# Patient Record
Sex: MALE | Race: WHITE | NOT HISPANIC OR LATINO | Employment: FULL TIME | URBAN - METROPOLITAN AREA
[De-identification: names, ages, dates, MRNs, and addresses within clinical notes are randomized per-mention and may not be internally consistent; named-entity substitution may affect disease eponyms.]

---

## 2017-01-09 ENCOUNTER — GENERIC CONVERSION - ENCOUNTER (OUTPATIENT)
Dept: OTHER | Facility: OTHER | Age: 52
End: 2017-01-09

## 2017-01-20 ENCOUNTER — ALLSCRIPTS OFFICE VISIT (OUTPATIENT)
Dept: OTHER | Facility: OTHER | Age: 52
End: 2017-01-20

## 2017-02-17 LAB
A/G RATIO (HISTORICAL): 1.4 (ref 1.1–2.5)
ALBUMIN SERPL BCP-MCNC: 4.3 G/DL (ref 3.5–5.5)
ALP SERPL-CCNC: 108 IU/L (ref 39–117)
ALT SERPL W P-5'-P-CCNC: 41 IU/L (ref 0–44)
AST SERPL W P-5'-P-CCNC: 23 IU/L (ref 0–40)
BILIRUB SERPL-MCNC: 0.4 MG/DL (ref 0–1.2)
BUN SERPL-MCNC: 16 MG/DL (ref 6–24)
BUN/CREA RATIO (HISTORICAL): 20 (ref 9–20)
CALCIUM SERPL-MCNC: 9.5 MG/DL (ref 8.7–10.2)
CHLORIDE SERPL-SCNC: 98 MMOL/L (ref 96–106)
CHOLEST SERPL-MCNC: 190 MG/DL (ref 100–199)
CO2 SERPL-SCNC: 28 MMOL/L (ref 18–29)
CREAT SERPL-MCNC: 0.81 MG/DL (ref 0.76–1.27)
EGFR AFRICAN AMERICAN (HISTORICAL): 117 ML/MIN/1.73
EGFR-AMERICAN CALC (HISTORICAL): 101 ML/MIN/1.73
GLUCOSE SERPL-MCNC: 165 MG/DL (ref 65–99)
HBA1C MFR BLD HPLC: 7.6 % (ref 4.8–5.6)
HDLC SERPL-MCNC: 47 MG/DL
LDL/HDL RATIO (HISTORICAL): 2.3 RATIO UNITS (ref 0–3.6)
LDLC SERPL CALC-MCNC: 108 MG/DL (ref 0–99)
MICROALBUM.,U,RANDOM (HISTORICAL): 34.9 UG/ML
POTASSIUM SERPL-SCNC: 4.3 MMOL/L (ref 3.5–5.2)
SODIUM SERPL-SCNC: 142 MMOL/L (ref 134–144)
TOT. GLOBULIN, SERUM (HISTORICAL): 3.1 G/DL (ref 1.5–4.5)
TOTAL PROTEIN (HISTORICAL): 7.4 G/DL (ref 6–8.5)
TRIGL SERPL-MCNC: 175 MG/DL (ref 0–149)
VLDLC SERPL CALC-MCNC: 35 MG/DL (ref 5–40)

## 2017-02-18 LAB — PROSTATE SPECIFIC ANTIGEN (HISTORICAL): 0.9 NG/ML (ref 0–4)

## 2017-02-19 ENCOUNTER — GENERIC CONVERSION - ENCOUNTER (OUTPATIENT)
Dept: OTHER | Facility: OTHER | Age: 52
End: 2017-02-19

## 2017-02-21 ENCOUNTER — GENERIC CONVERSION - ENCOUNTER (OUTPATIENT)
Dept: OTHER | Facility: OTHER | Age: 52
End: 2017-02-21

## 2017-03-03 ENCOUNTER — ALLSCRIPTS OFFICE VISIT (OUTPATIENT)
Dept: OTHER | Facility: OTHER | Age: 52
End: 2017-03-03

## 2017-03-17 ENCOUNTER — ALLSCRIPTS OFFICE VISIT (OUTPATIENT)
Dept: OTHER | Facility: OTHER | Age: 52
End: 2017-03-17

## 2017-03-24 ENCOUNTER — ALLSCRIPTS OFFICE VISIT (OUTPATIENT)
Dept: OTHER | Facility: OTHER | Age: 52
End: 2017-03-24

## 2017-03-31 ENCOUNTER — ALLSCRIPTS OFFICE VISIT (OUTPATIENT)
Dept: OTHER | Facility: OTHER | Age: 52
End: 2017-03-31

## 2017-04-14 ENCOUNTER — ALLSCRIPTS OFFICE VISIT (OUTPATIENT)
Dept: OTHER | Facility: OTHER | Age: 52
End: 2017-04-14

## 2017-04-14 ENCOUNTER — HOSPITAL ENCOUNTER (OUTPATIENT)
Dept: RADIOLOGY | Facility: CLINIC | Age: 52
Discharge: HOME | End: 2017-04-14
Payer: COMMERCIAL

## 2017-04-14 DIAGNOSIS — M25.551 PAIN IN RIGHT HIP: ICD-10-CM

## 2017-04-14 PROCEDURE — 73502 X-RAY EXAM HIP UNI 2-3 VIEWS: CPT

## 2017-06-16 ENCOUNTER — ALLSCRIPTS OFFICE VISIT (OUTPATIENT)
Dept: OTHER | Facility: OTHER | Age: 52
End: 2017-06-16

## 2017-07-31 ENCOUNTER — GENERIC CONVERSION - ENCOUNTER (OUTPATIENT)
Dept: OTHER | Facility: OTHER | Age: 52
End: 2017-07-31

## 2017-08-02 ENCOUNTER — ALLSCRIPTS OFFICE VISIT (OUTPATIENT)
Dept: OTHER | Facility: OTHER | Age: 52
End: 2017-08-02

## 2017-08-14 ENCOUNTER — APPOINTMENT (INPATIENT)
Dept: RADIOLOGY | Facility: HOSPITAL | Age: 52
DRG: 420 | End: 2017-08-14
Payer: COMMERCIAL

## 2017-08-14 ENCOUNTER — ALLSCRIPTS OFFICE VISIT (OUTPATIENT)
Dept: OTHER | Facility: OTHER | Age: 52
End: 2017-08-14

## 2017-08-14 ENCOUNTER — HOSPITAL ENCOUNTER (INPATIENT)
Facility: HOSPITAL | Age: 52
LOS: 2 days | Discharge: HOME | DRG: 420 | End: 2017-08-16
Attending: EMERGENCY MEDICINE | Admitting: INTERNAL MEDICINE
Payer: COMMERCIAL

## 2017-08-14 ENCOUNTER — GENERIC CONVERSION - ENCOUNTER (OUTPATIENT)
Dept: OTHER | Facility: OTHER | Age: 52
End: 2017-08-14

## 2017-08-14 DIAGNOSIS — E11.9 DIABETES MELLITUS, TYPE 2 (HCC): Chronic | ICD-10-CM

## 2017-08-14 DIAGNOSIS — E87.1 HYPONATREMIA: ICD-10-CM

## 2017-08-14 DIAGNOSIS — R73.9 HYPERGLYCEMIA: Primary | ICD-10-CM

## 2017-08-14 PROBLEM — E78.5 HYPERLIPIDEMIA: Chronic | Status: ACTIVE | Noted: 2017-08-14

## 2017-08-14 PROBLEM — E87.6 HYPOKALEMIA: Status: ACTIVE | Noted: 2017-08-14

## 2017-08-14 PROBLEM — N18.1 DM TYPE 2 CAUSING CKD STAGE 1 (HCC): Chronic | Status: ACTIVE | Noted: 2017-08-14

## 2017-08-14 PROBLEM — E11.22 DM TYPE 2 CAUSING CKD STAGE 1 (HCC): Chronic | Status: ACTIVE | Noted: 2017-08-14

## 2017-08-14 PROBLEM — R53.1 WEAKNESS GENERALIZED: Status: ACTIVE | Noted: 2017-08-14

## 2017-08-14 PROBLEM — E11.65 HYPERGLYCEMIA DUE TO TYPE 2 DIABETES MELLITUS (HCC): Status: ACTIVE | Noted: 2017-08-14

## 2017-08-14 PROBLEM — I10 HYPERTENSION: Chronic | Status: ACTIVE | Noted: 2017-08-14

## 2017-08-14 LAB
ACETONE SERPL-MCNC: ABNORMAL MG/DL
ANION GAP SERPL CALCULATED.3IONS-SCNC: 15 MMOL/L (ref 4–13)
BACTERIA UR QL AUTO: NORMAL /HPF
BASE EXCESS BLDA CALC-SCNC: -2 MMOL/L (ref -2–3)
BASOPHILS # BLD MANUAL: 0.1 THOUSAND/UL (ref 0–0.1)
BASOPHILS NFR MAR MANUAL: 1 % (ref 0–1)
BILIRUB UR QL STRIP: NEGATIVE
BUN SERPL-MCNC: 19 MG/DL (ref 5–25)
CALCIUM SERPL-MCNC: 9.1 MG/DL (ref 8.3–10.1)
CHLORIDE SERPL-SCNC: 79 MMOL/L (ref 100–108)
CLARITY UR: CLEAR
CO2 SERPL-SCNC: 23 MMOL/L (ref 21–32)
COLOR UR: YELLOW
CREAT SERPL-MCNC: 1.07 MG/DL (ref 0.6–1.3)
EOSINOPHIL # BLD MANUAL: 0 THOUSAND/UL (ref 0–0.4)
EOSINOPHIL NFR BLD MANUAL: 0 % (ref 0–6)
ERYTHROCYTE [DISTWIDTH] IN BLOOD BY AUTOMATED COUNT: 12.7 % (ref 11.6–15.1)
GFR SERPL CREATININE-BSD FRML MDRD: 79 ML/MIN/1.73SQ M
GLUCOSE SERPL-MCNC: 268 MG/DL (ref 65–140)
GLUCOSE SERPL-MCNC: 271 MG/DL (ref 65–140)
GLUCOSE SERPL-MCNC: 289 MG/DL (ref 65–140)
GLUCOSE SERPL-MCNC: 334 MG/DL (ref 65–140)
GLUCOSE UR STRIP-MCNC: ABNORMAL MG/DL
HCO3 BLDA-SCNC: 23 MMOL/L (ref 24–30)
HCT VFR BLD AUTO: 44 % (ref 36.5–49.3)
HGB BLD-MCNC: 16.4 G/DL (ref 12–17)
HGB UR QL STRIP.AUTO: ABNORMAL
KETONES UR STRIP-MCNC: ABNORMAL MG/DL
LEUKOCYTE ESTERASE UR QL STRIP: ABNORMAL
LYMPHOCYTES # BLD AUTO: 1.2 THOUSAND/UL (ref 0.6–4.47)
LYMPHOCYTES # BLD AUTO: 12 % (ref 14–44)
MAGNESIUM SERPL-MCNC: 1.7 MG/DL (ref 1.6–2.6)
MCH RBC QN AUTO: 29.8 PG (ref 26.8–34.3)
MCHC RBC AUTO-ENTMCNC: 37.3 G/DL (ref 31.4–37.4)
MCV RBC AUTO: 80 FL (ref 82–98)
MONOCYTES # BLD AUTO: 0.7 THOUSAND/UL (ref 0–1.22)
MONOCYTES NFR BLD: 7 % (ref 4–12)
NEUTROPHILS # BLD MANUAL: 7.8 THOUSAND/UL (ref 1.85–7.62)
NEUTS SEG NFR BLD AUTO: 78 % (ref 43–75)
NITRITE UR QL STRIP: NEGATIVE
NON-SQ EPI CELLS URNS QL MICRO: NORMAL /HPF
PCO2 BLD: 24 MMOL/L (ref 21–32)
PCO2 BLD: 40.5 MM HG (ref 42–50)
PH BLD: 7.36 [PH] (ref 7.3–7.4)
PH UR STRIP.AUTO: 5.5 [PH] (ref 4.5–8)
PHOSPHATE SERPL-MCNC: 3.2 MG/DL (ref 2.7–4.5)
PLATELET # BLD AUTO: 238 THOUSANDS/UL (ref 149–390)
PLATELET BLD QL SMEAR: ADEQUATE
PLATELET CLUMP BLD QL SMEAR: PRESENT
PMV BLD AUTO: 10.9 FL (ref 8.9–12.7)
PO2 BLD: 28 MM HG (ref 35–45)
POTASSIUM SERPL-SCNC: 3.3 MMOL/L (ref 3.5–5.3)
PROT UR STRIP-MCNC: NEGATIVE MG/DL
RBC # BLD AUTO: 5.5 MILLION/UL (ref 3.88–5.62)
RBC #/AREA URNS AUTO: NORMAL /HPF
RBC MORPH BLD: NORMAL
SAO2 % BLD FROM PO2: 51 % (ref 95–98)
SODIUM SERPL-SCNC: 117 MMOL/L (ref 136–145)
SP GR UR STRIP.AUTO: 1.01 (ref 1–1.03)
SPECIMEN SOURCE: ABNORMAL
TOTAL CELLS COUNTED SPEC: 100
TSH SERPL DL<=0.05 MIU/L-ACNC: 1.77 UIU/ML (ref 0.36–3.74)
UROBILINOGEN UR QL STRIP.AUTO: 0.2 E.U./DL
VARIANT LYMPHS # BLD AUTO: 2 %
WBC # BLD AUTO: 10 THOUSAND/UL (ref 4.31–10.16)
WBC #/AREA URNS AUTO: NORMAL /HPF

## 2017-08-14 PROCEDURE — 86753 PROTOZOA ANTIBODY NOS: CPT | Performed by: INTERNAL MEDICINE

## 2017-08-14 PROCEDURE — 82803 BLOOD GASES ANY COMBINATION: CPT

## 2017-08-14 PROCEDURE — 82009 KETONE BODYS QUAL: CPT | Performed by: EMERGENCY MEDICINE

## 2017-08-14 PROCEDURE — 85027 COMPLETE CBC AUTOMATED: CPT | Performed by: EMERGENCY MEDICINE

## 2017-08-14 PROCEDURE — 83036 HEMOGLOBIN GLYCOSYLATED A1C: CPT | Performed by: INTERNAL MEDICINE

## 2017-08-14 PROCEDURE — 96360 HYDRATION IV INFUSION INIT: CPT

## 2017-08-14 PROCEDURE — 84100 ASSAY OF PHOSPHORUS: CPT | Performed by: EMERGENCY MEDICINE

## 2017-08-14 PROCEDURE — 83735 ASSAY OF MAGNESIUM: CPT | Performed by: EMERGENCY MEDICINE

## 2017-08-14 PROCEDURE — 82948 REAGENT STRIP/BLOOD GLUCOSE: CPT

## 2017-08-14 PROCEDURE — 86618 LYME DISEASE ANTIBODY: CPT | Performed by: INTERNAL MEDICINE

## 2017-08-14 PROCEDURE — 94640 AIRWAY INHALATION TREATMENT: CPT

## 2017-08-14 PROCEDURE — 93005 ELECTROCARDIOGRAM TRACING: CPT | Performed by: INTERNAL MEDICINE

## 2017-08-14 PROCEDURE — 84443 ASSAY THYROID STIM HORMONE: CPT | Performed by: INTERNAL MEDICINE

## 2017-08-14 PROCEDURE — 71020 HB CHEST X-RAY 2VW FRONTAL&LATL: CPT

## 2017-08-14 PROCEDURE — 81001 URINALYSIS AUTO W/SCOPE: CPT | Performed by: EMERGENCY MEDICINE

## 2017-08-14 PROCEDURE — 83930 ASSAY OF BLOOD OSMOLALITY: CPT | Performed by: INTERNAL MEDICINE

## 2017-08-14 PROCEDURE — 36415 COLL VENOUS BLD VENIPUNCTURE: CPT | Performed by: EMERGENCY MEDICINE

## 2017-08-14 PROCEDURE — 99285 EMERGENCY DEPT VISIT HI MDM: CPT

## 2017-08-14 PROCEDURE — 80048 BASIC METABOLIC PNL TOTAL CA: CPT | Performed by: EMERGENCY MEDICINE

## 2017-08-14 PROCEDURE — 86618 LYME DISEASE ANTIBODY: CPT | Performed by: EMERGENCY MEDICINE

## 2017-08-14 PROCEDURE — 94760 N-INVAS EAR/PLS OXIMETRY 1: CPT

## 2017-08-14 PROCEDURE — 86666 EHRLICHIA ANTIBODY: CPT | Performed by: INTERNAL MEDICINE

## 2017-08-14 PROCEDURE — 85007 BL SMEAR W/DIFF WBC COUNT: CPT | Performed by: EMERGENCY MEDICINE

## 2017-08-14 RX ORDER — ALBUTEROL SULFATE 90 UG/1
2 AEROSOL, METERED RESPIRATORY (INHALATION) EVERY 4 HOURS PRN
Status: DISCONTINUED | OUTPATIENT
Start: 2017-08-14 | End: 2017-08-16 | Stop reason: HOSPADM

## 2017-08-14 RX ORDER — BUDESONIDE AND FORMOTEROL FUMARATE DIHYDRATE 160; 4.5 UG/1; UG/1
2 AEROSOL RESPIRATORY (INHALATION)
Status: DISCONTINUED | OUTPATIENT
Start: 2017-08-15 | End: 2017-08-14

## 2017-08-14 RX ORDER — SODIUM CHLORIDE 9 MG/ML
50 INJECTION, SOLUTION INTRAVENOUS CONTINUOUS
Status: DISCONTINUED | OUTPATIENT
Start: 2017-08-14 | End: 2017-08-16 | Stop reason: HOSPADM

## 2017-08-14 RX ORDER — BUDESONIDE AND FORMOTEROL FUMARATE DIHYDRATE 160; 4.5 UG/1; UG/1
2 AEROSOL RESPIRATORY (INHALATION)
Status: DISCONTINUED | OUTPATIENT
Start: 2017-08-14 | End: 2017-08-16 | Stop reason: HOSPADM

## 2017-08-14 RX ORDER — ATORVASTATIN CALCIUM 20 MG/1
TABLET, FILM COATED ORAL
COMMUNITY
Start: 2017-04-25 | End: 2018-04-29 | Stop reason: SDUPTHER

## 2017-08-14 RX ORDER — ROPINIROLE 0.25 MG/1
0.5 TABLET, FILM COATED ORAL
Status: DISCONTINUED | OUTPATIENT
Start: 2017-08-14 | End: 2017-08-15

## 2017-08-14 RX ORDER — HYDROCHLOROTHIAZIDE 12.5 MG/1
CAPSULE, GELATIN COATED ORAL
COMMUNITY
Start: 2015-01-02 | End: 2017-08-16 | Stop reason: HOSPADM

## 2017-08-14 RX ORDER — ALBUTEROL SULFATE 90 UG/1
2 AEROSOL, METERED RESPIRATORY (INHALATION) EVERY 4 HOURS PRN
COMMUNITY
Start: 2017-07-25 | End: 2018-01-31 | Stop reason: SDUPTHER

## 2017-08-14 RX ORDER — ACETAMINOPHEN 325 MG/1
650 TABLET ORAL EVERY 6 HOURS PRN
Status: DISCONTINUED | OUTPATIENT
Start: 2017-08-14 | End: 2017-08-16 | Stop reason: HOSPADM

## 2017-08-14 RX ORDER — ALLOPURINOL 100 MG/1
TABLET ORAL
COMMUNITY
Start: 2017-05-22 | End: 2017-08-16 | Stop reason: HOSPADM

## 2017-08-14 RX ORDER — ATORVASTATIN CALCIUM 40 MG/1
40 TABLET, FILM COATED ORAL
Status: DISCONTINUED | OUTPATIENT
Start: 2017-08-14 | End: 2017-08-16 | Stop reason: HOSPADM

## 2017-08-14 RX ORDER — HYDROCODONE BITARTRATE AND ACETAMINOPHEN 10; 325 MG/1; MG/1
TABLET ORAL
COMMUNITY
Start: 2015-10-27 | End: 2018-01-31 | Stop reason: SDUPTHER

## 2017-08-14 RX ORDER — HYDROCODONE BITARTRATE AND ACETAMINOPHEN 5; 325 MG/1; MG/1
1 TABLET ORAL EVERY 6 HOURS PRN
Status: DISCONTINUED | OUTPATIENT
Start: 2017-08-14 | End: 2017-08-16 | Stop reason: HOSPADM

## 2017-08-14 RX ORDER — LISINOPRIL 40 MG/1
TABLET ORAL
COMMUNITY
Start: 2014-03-05 | End: 2018-02-24 | Stop reason: SDUPTHER

## 2017-08-14 RX ORDER — ROPINIROLE 0.5 MG/1
TABLET, FILM COATED ORAL
COMMUNITY
Start: 2017-08-14 | End: 2017-08-16 | Stop reason: HOSPADM

## 2017-08-14 RX ORDER — ALLOPURINOL 100 MG/1
200 TABLET ORAL DAILY
Status: DISCONTINUED | OUTPATIENT
Start: 2017-08-15 | End: 2017-08-16 | Stop reason: HOSPADM

## 2017-08-14 RX ADMIN — ROPINIROLE 0.5 MG: 0.25 TABLET, FILM COATED ORAL at 21:49

## 2017-08-14 RX ADMIN — INSULIN LISPRO 3 UNITS: 100 INJECTION, SOLUTION INTRAVENOUS; SUBCUTANEOUS at 21:50

## 2017-08-14 RX ADMIN — SODIUM CHLORIDE 1000 ML: 0.9 INJECTION, SOLUTION INTRAVENOUS at 15:31

## 2017-08-14 RX ADMIN — BUDESONIDE AND FORMOTEROL FUMARATE DIHYDRATE 2 PUFF: 160; 4.5 AEROSOL RESPIRATORY (INHALATION) at 20:00

## 2017-08-14 RX ADMIN — INSULIN LISPRO 3 UNITS: 100 INJECTION, SOLUTION INTRAVENOUS; SUBCUTANEOUS at 19:12

## 2017-08-14 RX ADMIN — INSULIN DETEMIR 25 UNITS: 100 INJECTION, SOLUTION SUBCUTANEOUS at 21:49

## 2017-08-14 RX ADMIN — SODIUM CHLORIDE 100 ML/HR: 0.9 INJECTION, SOLUTION INTRAVENOUS at 19:13

## 2017-08-15 ENCOUNTER — APPOINTMENT (INPATIENT)
Dept: CT IMAGING | Facility: HOSPITAL | Age: 52
DRG: 420 | End: 2017-08-15
Payer: COMMERCIAL

## 2017-08-15 LAB
ALBUMIN SERPL BCP-MCNC: 3.1 G/DL (ref 3.5–5)
ALP SERPL-CCNC: 80 U/L (ref 46–116)
ALT SERPL W P-5'-P-CCNC: 64 U/L (ref 12–78)
ANION GAP SERPL CALCULATED.3IONS-SCNC: 6 MMOL/L (ref 4–13)
AST SERPL W P-5'-P-CCNC: 36 U/L (ref 5–45)
ATRIAL RATE: 76 BPM
ATRIAL RATE: 95 BPM
B BURGDOR IGG SER IA-ACNC: 0.43
B BURGDOR IGM SER IA-ACNC: 0.23
BACTERIA UR QL AUTO: ABNORMAL /HPF
BILIRUB SERPL-MCNC: 0.8 MG/DL (ref 0.2–1)
BILIRUB UR QL STRIP: NEGATIVE
BUN SERPL-MCNC: 13 MG/DL (ref 5–25)
CALCIUM SERPL-MCNC: 8.7 MG/DL (ref 8.3–10.1)
CHLORIDE SERPL-SCNC: 89 MMOL/L (ref 100–108)
CLARITY UR: CLEAR
CO2 SERPL-SCNC: 29 MMOL/L (ref 21–32)
COLOR UR: YELLOW
CREAT SERPL-MCNC: 0.77 MG/DL (ref 0.6–1.3)
ERYTHROCYTE [DISTWIDTH] IN BLOOD BY AUTOMATED COUNT: 12.6 % (ref 11.6–15.1)
EST. AVERAGE GLUCOSE BLD GHB EST-MCNC: 283 MG/DL
GFR SERPL CREATININE-BSD FRML MDRD: 104 ML/MIN/1.73SQ M
GLUCOSE SERPL-MCNC: 222 MG/DL (ref 65–140)
GLUCOSE SERPL-MCNC: 232 MG/DL (ref 65–140)
GLUCOSE SERPL-MCNC: 283 MG/DL (ref 65–140)
GLUCOSE SERPL-MCNC: 320 MG/DL (ref 65–140)
GLUCOSE SERPL-MCNC: 329 MG/DL (ref 65–140)
GLUCOSE UR STRIP-MCNC: ABNORMAL MG/DL
HBA1C MFR BLD: 11.5 % (ref 4.2–6.3)
HCT VFR BLD AUTO: 42.6 % (ref 36.5–49.3)
HGB BLD-MCNC: 15.3 G/DL (ref 12–17)
HGB UR QL STRIP.AUTO: ABNORMAL
KETONES UR STRIP-MCNC: ABNORMAL MG/DL
LEUKOCYTE ESTERASE UR QL STRIP: ABNORMAL
MCH RBC QN AUTO: 28.8 PG (ref 26.8–34.3)
MCHC RBC AUTO-ENTMCNC: 35.9 G/DL (ref 31.4–37.4)
MCV RBC AUTO: 80 FL (ref 82–98)
NITRITE UR QL STRIP: NEGATIVE
NON-SQ EPI CELLS URNS QL MICRO: ABNORMAL /HPF
OSMOLALITY UR/SERPL-RTO: 269 MMOL/KG (ref 282–298)
OSMOLALITY UR: 381 MMOL/KG
P AXIS: 54 DEGREES
P AXIS: 54 DEGREES
PH UR STRIP.AUTO: 6 [PH] (ref 4.5–8)
PLATELET # BLD AUTO: 201 THOUSANDS/UL (ref 149–390)
PMV BLD AUTO: 10.5 FL (ref 8.9–12.7)
POTASSIUM SERPL-SCNC: 3.5 MMOL/L (ref 3.5–5.3)
PR INTERVAL: 152 MS
PR INTERVAL: 152 MS
PROT SERPL-MCNC: 6.4 G/DL (ref 6.4–8.2)
PROT UR STRIP-MCNC: NEGATIVE MG/DL
QRS AXIS: -35 DEGREES
QRS AXIS: -50 DEGREES
QRSD INTERVAL: 100 MS
QRSD INTERVAL: 102 MS
QT INTERVAL: 402 MS
QT INTERVAL: 418 MS
QTC INTERVAL: 470 MS
QTC INTERVAL: 505 MS
RBC # BLD AUTO: 5.32 MILLION/UL (ref 3.88–5.62)
RBC #/AREA URNS AUTO: ABNORMAL /HPF
SODIUM SERPL-SCNC: 124 MMOL/L (ref 136–145)
SODIUM SERPL-SCNC: 126 MMOL/L (ref 136–145)
SP GR UR STRIP.AUTO: 1.01 (ref 1–1.03)
T WAVE AXIS: 35 DEGREES
T WAVE AXIS: 57 DEGREES
T4 FREE SERPL-MCNC: 1.3 NG/DL (ref 0.76–1.46)
UROBILINOGEN UR QL STRIP.AUTO: 0.2 E.U./DL
VENTRICULAR RATE: 76 BPM
VENTRICULAR RATE: 95 BPM
WBC # BLD AUTO: 7.11 THOUSAND/UL (ref 4.31–10.16)
WBC #/AREA URNS AUTO: ABNORMAL /HPF

## 2017-08-15 PROCEDURE — 94760 N-INVAS EAR/PLS OXIMETRY 1: CPT

## 2017-08-15 PROCEDURE — 84439 ASSAY OF FREE THYROXINE: CPT | Performed by: INTERNAL MEDICINE

## 2017-08-15 PROCEDURE — 74177 CT ABD & PELVIS W/CONTRAST: CPT

## 2017-08-15 PROCEDURE — 81001 URINALYSIS AUTO W/SCOPE: CPT | Performed by: INTERNAL MEDICINE

## 2017-08-15 PROCEDURE — 83935 ASSAY OF URINE OSMOLALITY: CPT | Performed by: INTERNAL MEDICINE

## 2017-08-15 PROCEDURE — 80053 COMPREHEN METABOLIC PANEL: CPT | Performed by: INTERNAL MEDICINE

## 2017-08-15 PROCEDURE — 82948 REAGENT STRIP/BLOOD GLUCOSE: CPT

## 2017-08-15 PROCEDURE — 93005 ELECTROCARDIOGRAM TRACING: CPT | Performed by: INTERNAL MEDICINE

## 2017-08-15 PROCEDURE — 85027 COMPLETE CBC AUTOMATED: CPT | Performed by: INTERNAL MEDICINE

## 2017-08-15 PROCEDURE — 84295 ASSAY OF SERUM SODIUM: CPT | Performed by: INTERNAL MEDICINE

## 2017-08-15 PROCEDURE — 94640 AIRWAY INHALATION TREATMENT: CPT

## 2017-08-15 RX ORDER — ROPINIROLE 0.25 MG/1
0.5 TABLET, FILM COATED ORAL EVERY 12 HOURS
Status: DISCONTINUED | OUTPATIENT
Start: 2017-08-15 | End: 2017-08-16 | Stop reason: HOSPADM

## 2017-08-15 RX ADMIN — INSULIN LISPRO 5 UNITS: 100 INJECTION, SOLUTION INTRAVENOUS; SUBCUTANEOUS at 12:07

## 2017-08-15 RX ADMIN — INSULIN LISPRO 2 UNITS: 100 INJECTION, SOLUTION INTRAVENOUS; SUBCUTANEOUS at 08:29

## 2017-08-15 RX ADMIN — IOHEXOL 50 ML: 240 INJECTION, SOLUTION INTRATHECAL; INTRAVASCULAR; INTRAVENOUS; ORAL at 08:16

## 2017-08-15 RX ADMIN — INSULIN DETEMIR 35 UNITS: 100 INJECTION, SOLUTION SUBCUTANEOUS at 22:42

## 2017-08-15 RX ADMIN — ALLOPURINOL 200 MG: 100 TABLET ORAL at 08:29

## 2017-08-15 RX ADMIN — SODIUM CHLORIDE 100 ML/HR: 0.9 INJECTION, SOLUTION INTRAVENOUS at 06:34

## 2017-08-15 RX ADMIN — IOHEXOL 100 ML: 350 INJECTION, SOLUTION INTRAVENOUS at 08:16

## 2017-08-15 RX ADMIN — ATORVASTATIN CALCIUM 40 MG: 40 TABLET, FILM COATED ORAL at 16:31

## 2017-08-15 RX ADMIN — INSULIN LISPRO 7 UNITS: 100 INJECTION, SOLUTION INTRAVENOUS; SUBCUTANEOUS at 16:30

## 2017-08-15 RX ADMIN — ROPINIROLE 0.5 MG: 0.25 TABLET, FILM COATED ORAL at 23:54

## 2017-08-15 RX ADMIN — BUDESONIDE AND FORMOTEROL FUMARATE DIHYDRATE 2 PUFF: 160; 4.5 AEROSOL RESPIRATORY (INHALATION) at 19:51

## 2017-08-15 RX ADMIN — INSULIN LISPRO 4 UNITS: 100 INJECTION, SOLUTION INTRAVENOUS; SUBCUTANEOUS at 16:30

## 2017-08-15 RX ADMIN — ROPINIROLE 0.5 MG: 0.25 TABLET, FILM COATED ORAL at 12:07

## 2017-08-15 RX ADMIN — BUDESONIDE AND FORMOTEROL FUMARATE DIHYDRATE 2 PUFF: 160; 4.5 AEROSOL RESPIRATORY (INHALATION) at 09:42

## 2017-08-15 RX ADMIN — INSULIN LISPRO 3 UNITS: 100 INJECTION, SOLUTION INTRAVENOUS; SUBCUTANEOUS at 22:43

## 2017-08-15 RX ADMIN — ENOXAPARIN SODIUM 40 MG: 40 INJECTION SUBCUTANEOUS at 08:29

## 2017-08-15 RX ADMIN — HYDROCODONE BITARTRATE AND ACETAMINOPHEN 1 TABLET: 5; 325 TABLET ORAL at 16:33

## 2017-08-15 RX ADMIN — HYDROCODONE BITARTRATE AND ACETAMINOPHEN 1 TABLET: 5; 325 TABLET ORAL at 22:42

## 2017-08-16 VITALS
HEIGHT: 71 IN | OXYGEN SATURATION: 94 % | HEART RATE: 77 BPM | DIASTOLIC BLOOD PRESSURE: 82 MMHG | RESPIRATION RATE: 18 BRPM | SYSTOLIC BLOOD PRESSURE: 145 MMHG | TEMPERATURE: 98.2 F | WEIGHT: 299.83 LBS | BODY MASS INDEX: 41.98 KG/M2

## 2017-08-16 LAB
A PHAGOCYTOPH IGG TITR SER IF: NEGATIVE {TITER}
A PHAGOCYTOPH IGM TITR SER IF: NEGATIVE {TITER}
ALBUMIN SERPL BCP-MCNC: 2.9 G/DL (ref 3.5–5)
ALP SERPL-CCNC: 70 U/L (ref 46–116)
ALT SERPL W P-5'-P-CCNC: 68 U/L (ref 12–78)
ANION GAP SERPL CALCULATED.3IONS-SCNC: 5 MMOL/L (ref 4–13)
AST SERPL W P-5'-P-CCNC: 29 U/L (ref 5–45)
B BURGDOR IGG SER IA-ACNC: 0.39
B BURGDOR IGM SER IA-ACNC: 0.34
B MICROTI IGG TITR SER: NORMAL {TITER}
B MICROTI IGM TITR SER: NORMAL {TITER}
BILIRUB SERPL-MCNC: 0.4 MG/DL (ref 0.2–1)
BUN SERPL-MCNC: 10 MG/DL (ref 5–25)
CALCIUM SERPL-MCNC: 8.3 MG/DL (ref 8.3–10.1)
CHLORIDE SERPL-SCNC: 96 MMOL/L (ref 100–108)
CO2 SERPL-SCNC: 32 MMOL/L (ref 21–32)
CREAT SERPL-MCNC: 0.81 MG/DL (ref 0.6–1.3)
E CHAFFEENSIS IGG TITR SER IF: NEGATIVE {TITER}
E CHAFFEENSIS IGM TITR SER IF: NEGATIVE {TITER}
ERYTHROCYTE [DISTWIDTH] IN BLOOD BY AUTOMATED COUNT: 12.7 % (ref 11.6–15.1)
GFR SERPL CREATININE-BSD FRML MDRD: 101 ML/MIN/1.73SQ M
GLUCOSE SERPL-MCNC: 205 MG/DL (ref 65–140)
GLUCOSE SERPL-MCNC: 227 MG/DL (ref 65–140)
GLUCOSE SERPL-MCNC: 325 MG/DL (ref 65–140)
HCT VFR BLD AUTO: 40 % (ref 36.5–49.3)
HGB BLD-MCNC: 14.2 G/DL (ref 12–17)
MCH RBC QN AUTO: 29.2 PG (ref 26.8–34.3)
MCHC RBC AUTO-ENTMCNC: 35.5 G/DL (ref 31.4–37.4)
MCV RBC AUTO: 82 FL (ref 82–98)
PLATELET # BLD AUTO: 172 THOUSANDS/UL (ref 149–390)
PMV BLD AUTO: 9.8 FL (ref 8.9–12.7)
POTASSIUM SERPL-SCNC: 3.1 MMOL/L (ref 3.5–5.3)
PROT SERPL-MCNC: 5.7 G/DL (ref 6.4–8.2)
RBC # BLD AUTO: 4.86 MILLION/UL (ref 3.88–5.62)
SODIUM SERPL-SCNC: 133 MMOL/L (ref 136–145)
WBC # BLD AUTO: 4.19 THOUSAND/UL (ref 4.31–10.16)

## 2017-08-16 PROCEDURE — 97165 OT EVAL LOW COMPLEX 30 MIN: CPT

## 2017-08-16 PROCEDURE — G8987 SELF CARE CURRENT STATUS: HCPCS

## 2017-08-16 PROCEDURE — G8989 SELF CARE D/C STATUS: HCPCS

## 2017-08-16 PROCEDURE — 80053 COMPREHEN METABOLIC PANEL: CPT | Performed by: INTERNAL MEDICINE

## 2017-08-16 PROCEDURE — G8988 SELF CARE GOAL STATUS: HCPCS

## 2017-08-16 PROCEDURE — 94640 AIRWAY INHALATION TREATMENT: CPT

## 2017-08-16 PROCEDURE — 85027 COMPLETE CBC AUTOMATED: CPT | Performed by: INTERNAL MEDICINE

## 2017-08-16 PROCEDURE — 94760 N-INVAS EAR/PLS OXIMETRY 1: CPT

## 2017-08-16 PROCEDURE — 82948 REAGENT STRIP/BLOOD GLUCOSE: CPT

## 2017-08-16 RX ORDER — ROPINIROLE 0.5 MG/1
0.5 TABLET, FILM COATED ORAL EVERY 12 HOURS
Qty: 60 TABLET | Refills: 0 | Status: SHIPPED | OUTPATIENT
Start: 2017-08-16 | End: 2018-05-01 | Stop reason: SDUPTHER

## 2017-08-16 RX ORDER — ALLOPURINOL 100 MG/1
200 TABLET ORAL DAILY
Qty: 60 TABLET | Refills: 0 | Status: SHIPPED | OUTPATIENT
Start: 2017-08-16 | End: 2018-02-17 | Stop reason: SDUPTHER

## 2017-08-16 RX ORDER — POTASSIUM CHLORIDE 750 MG/1
10 TABLET, EXTENDED RELEASE ORAL 2 TIMES DAILY
Qty: 60 TABLET | Refills: 0 | Status: SHIPPED | OUTPATIENT
Start: 2017-08-16

## 2017-08-16 RX ADMIN — ROPINIROLE 0.5 MG: 0.25 TABLET, FILM COATED ORAL at 09:37

## 2017-08-16 RX ADMIN — HYDROCODONE BITARTRATE AND ACETAMINOPHEN 1 TABLET: 5; 325 TABLET ORAL at 09:37

## 2017-08-16 RX ADMIN — INSULIN LISPRO 2 UNITS: 100 INJECTION, SOLUTION INTRAVENOUS; SUBCUTANEOUS at 08:16

## 2017-08-16 RX ADMIN — ENOXAPARIN SODIUM 40 MG: 40 INJECTION SUBCUTANEOUS at 08:15

## 2017-08-16 RX ADMIN — ALLOPURINOL 200 MG: 100 TABLET ORAL at 08:16

## 2017-08-16 RX ADMIN — BUDESONIDE AND FORMOTEROL FUMARATE DIHYDRATE 2 PUFF: 160; 4.5 AEROSOL RESPIRATORY (INHALATION) at 10:18

## 2017-08-16 RX ADMIN — INSULIN LISPRO 7 UNITS: 100 INJECTION, SOLUTION INTRAVENOUS; SUBCUTANEOUS at 08:16

## 2017-08-29 ENCOUNTER — GENERIC CONVERSION - ENCOUNTER (OUTPATIENT)
Dept: OTHER | Facility: OTHER | Age: 52
End: 2017-08-29

## 2017-08-30 ENCOUNTER — ALLSCRIPTS OFFICE VISIT (OUTPATIENT)
Dept: OTHER | Facility: OTHER | Age: 52
End: 2017-08-30

## 2018-01-11 NOTE — RESULT NOTES
Message   Recorded as Task   Date: 02/19/2017 01:15 PM, Created By: Mireya Garibay   Task Name: Call Patient with results   Assigned To:  Charly Harris   Regarding Patient: Siobhan Boyd, Status: Active   CommentJonda Gal - 19 Feb 2017 1:15 PM     Patient Phone: [phone]    mm/labs Srikanth Reiman - 21 Feb 2017 3:27 PM     TASK EDITED  MSG LEFT--- DUE IN C/ Eras 47 MM        Signatures   Electronically signed by : Donald Sorensen, ; Feb 21 2017  3:27PM EST                       (Author)

## 2018-01-12 VITALS
HEIGHT: 71 IN | WEIGHT: 315 LBS | HEART RATE: 105 BPM | DIASTOLIC BLOOD PRESSURE: 80 MMHG | OXYGEN SATURATION: 96 % | BODY MASS INDEX: 44.1 KG/M2 | SYSTOLIC BLOOD PRESSURE: 138 MMHG

## 2018-01-12 VITALS
HEIGHT: 71 IN | BODY MASS INDEX: 44.1 KG/M2 | DIASTOLIC BLOOD PRESSURE: 78 MMHG | HEART RATE: 84 BPM | WEIGHT: 315 LBS | SYSTOLIC BLOOD PRESSURE: 118 MMHG

## 2018-01-12 VITALS
SYSTOLIC BLOOD PRESSURE: 138 MMHG | DIASTOLIC BLOOD PRESSURE: 82 MMHG | HEIGHT: 71 IN | HEART RATE: 110 BPM | WEIGHT: 290 LBS | OXYGEN SATURATION: 98 % | BODY MASS INDEX: 40.6 KG/M2

## 2018-01-12 NOTE — RESULT NOTES
Verified Results  (1) COMPREHENSIVE METABOLIC PANEL [id number] 38:70UD Manisha Payne     Test Name Result Flag Reference   Glucose, Serum 165 mg/dL H 65-99   BUN 16 mg/dL  6-24   Creatinine, Serum 0 81 mg/dL  0 76-1 27   eGFR If NonAfricn Am 101 mL/min/1 73  >59   eGFR If Africn Am 117 mL/min/1 73  >59   BUN/Creatinine Ratio 20  9-20   Sodium, Serum 142 mmol/L  134-144   Potassium, Serum 4 3 mmol/L  3 5-5 2   Chloride, Serum 98 mmol/L     Carbon Dioxide, Total 28 mmol/L  18-29   Calcium, Serum 9 5 mg/dL  8 7-10 2   Protein, Total, Serum 7 4 g/dL  6 0-8 5   Albumin, Serum 4 3 g/dL  3 5-5 5   Globulin, Total 3 1 g/dL  1 5-4 5   A/G Ratio 1 4  1 1-2 5   **Effective March 13, 2017 the reference interval**                   for A/G Ratio will be changing to:                               Age                Male          Female                           0 -  7 days       1 1 - 2 3       1 1 - 2 3                           8 - 30 days       1 2 - 2 8       1 2 - 2 8                           1 -  6 months     1 3 - 3 6       1 3 - 3 6                    7 months -  5 years      1 5 - 2 6       1 5 - 2 6                              > 5 years      1 2 - 2 2       1 2 - 2 2   Bilirubin, Total 0 4 mg/dL  0 0-1 2   Alkaline Phosphatase, S 108 IU/L     AST (SGOT) 23 IU/L  0-40   ALT (SGPT) 41 IU/L  0-44   Glucose, Serum 165 mg/dL H 65-99   BUN 16 mg/dL  6-24   Creatinine, Serum 0 81 mg/dL  0 76-1 27   eGFR If NonAfricn Am 101 mL/min/1 73  >59   eGFR If Africn Am 117 mL/min/1 73  >59   BUN/Creatinine Ratio 20  9-20   Sodium, Serum 142 mmol/L  134-144   Potassium, Serum 4 3 mmol/L  3 5-5 2   Chloride, Serum 98 mmol/L     Carbon Dioxide, Total 28 mmol/L  18-29   Calcium, Serum 9 5 mg/dL  8 7-10 2   Protein, Total, Serum 7 4 g/dL  6 0-8 5   Albumin, Serum 4 3 g/dL  3 5-5 5   Globulin, Total 3 1 g/dL  1 5-4 5   A/G Ratio 1 4  1 1-2 5   **Effective March 13, 2017 the reference interval**                   for A/G Ratio will be changing to: Age                Male          Female                           0 -  7 days       1 1 - 2 3       1 1 - 2 3                           8 - 30 days       1 2 - 2 8       1 2 - 2 8                           1 -  6 months     1 3 - 3 6       1 3 - 3 6                    7 months -  5 years      1 5 - 2 6       1 5 - 2 6                              > 5 years      1 2 - 2 2       1 2 - 2 2   Bilirubin, Total 0 4 mg/dL  0 0-1 2   Alkaline Phosphatase, S 108 IU/L     AST (SGOT) 23 IU/L  0-40   ALT (SGPT) 41 IU/L  0-44           (LC) Lipid Panel With LDL/HDL Ratio [id number] 06:34AM Manisha Osbornron     Test Name Result Flag Reference   Cholesterol, Total 190 mg/dL  100-199   Triglycerides 175 mg/dL H 0-149   HDL Cholesterol 47 mg/dL  >39   VLDL Cholesterol Gallito 35 mg/dL  5-40   LDL Cholesterol Calc 108 mg/dL H 0-99   LDL/HDL Ratio 2 3 ratio units  0 0-3 6   LDL/HDL Ratio                                                             Men  Women                                               1/2 Avg  Risk  1 0    1 5                                                   Avg Risk  3 6    3 2                                                2X Avg  Risk  6 2    5 0                                                3X Avg  Risk  8 0    6 1   Cholesterol, Total 190 mg/dL  100-199   Triglycerides 175 mg/dL H 0-149   HDL Cholesterol 47 mg/dL  >39   VLDL Cholesterol Gallito 35 mg/dL  5-40   LDL Cholesterol Calc 108 mg/dL H 0-99   LDL/HDL Ratio 2 3 ratio units  0 0-3 6   LDL/HDL Ratio                                                             Men  Women                                               1/2 Avg  Risk  1 0    1 5                                                   Avg Risk  3 6    3 2                                                2X Avg  Risk  6 2    5 0                                                3X Avg  Risk  8 0    6 1           (1) HEMOGLOBIN A1C [id number] 06:34AM Manisha Patron Test Name Result Flag Reference   Hemoglobin A1c 7 6 % H 4 8-5 6   Pre-diabetes: 5 7 - 6 4           Diabetes: >6 4           Glycemic control for adults with diabetes: <7 0                       (1) PSA (SCREEN) (Dx V76 44 Screen for Prostate Cancer) [id number] 06:34AM Trevon Sill     Test Name Result Flag Reference   Prostate Specific Ag, Serum 0 9 ng/mL  0 0-4 0   Roche ECLIA methodology  According to the American Urological Association, Serum PSA should  decrease and remain at undetectable levels after radical  prostatectomy  The AUA defines biochemical recurrence as an initial  PSA value 0 2 ng/mL or greater followed by a subsequent confirmatory  PSA value 0 2 ng/mL or greater  Values obtained with different assay methods or kits cannot be used  interchangeably  Results cannot be interpreted as absolute evidence  of the presence or absence of malignant disease  Earnest Nunez Microalbumin, Random Urine 35ETK3256 06:34AM Quilcene Sill     Test Name Result Flag Reference   Microalbumin, Urine 34 9 ug/mL  Not Estab

## 2018-01-13 VITALS
DIASTOLIC BLOOD PRESSURE: 86 MMHG | WEIGHT: 315 LBS | SYSTOLIC BLOOD PRESSURE: 125 MMHG | HEIGHT: 71 IN | BODY MASS INDEX: 44.1 KG/M2 | HEART RATE: 98 BPM

## 2018-01-13 VITALS
SYSTOLIC BLOOD PRESSURE: 124 MMHG | RESPIRATION RATE: 16 BRPM | HEIGHT: 71 IN | WEIGHT: 315 LBS | DIASTOLIC BLOOD PRESSURE: 88 MMHG | BODY MASS INDEX: 44.1 KG/M2 | HEART RATE: 88 BPM

## 2018-01-13 VITALS
HEIGHT: 71 IN | HEART RATE: 99 BPM | WEIGHT: 315 LBS | SYSTOLIC BLOOD PRESSURE: 143 MMHG | DIASTOLIC BLOOD PRESSURE: 80 MMHG | BODY MASS INDEX: 44.1 KG/M2

## 2018-01-14 VITALS
DIASTOLIC BLOOD PRESSURE: 89 MMHG | RESPIRATION RATE: 20 BRPM | HEART RATE: 96 BPM | WEIGHT: 315 LBS | BODY MASS INDEX: 46.03 KG/M2 | SYSTOLIC BLOOD PRESSURE: 130 MMHG

## 2018-01-14 VITALS
BODY MASS INDEX: 46.58 KG/M2 | WEIGHT: 315 LBS | DIASTOLIC BLOOD PRESSURE: 101 MMHG | SYSTOLIC BLOOD PRESSURE: 161 MMHG | HEART RATE: 109 BPM

## 2018-01-15 NOTE — MISCELLANEOUS
Assessment    1  Controlled diabetes mellitus type II without complication (898 78) (L61 6)   2  Asthma (493 90) (J45 909)    Plan  Benign essential hypertension    · HydroCHLOROthiazide 12 5 MG Oral Capsule   Rx By: Kimberly Kaur; Dispense: 90 Days ; #:90 Capsule; Refill: 1; For: Benign essential hypertension; NEETA = N; [facility] To: [id number]; Last Updated By: Nicol Dietz; 2017 2:48:14 PM  Chronic arthritis    · Hydrocodone-Acetaminophen  MG Oral Tablet; 1 q 6hrs prn; Do Not Fill  Before: [id number]   Rx By: Nicol Dietz; Dispense: 0 Days ; #:90 Tablet; Refill: 0; For: Chronic arthritis; NEETA = N; Print Rx  Need for vaccination    · Influenza   For: Need for vaccination; Ordered By:Klever Harris; Effective Date:2017; Administered by: Savanah Robb: 2017 2:48:00 PM; Last Updated By: Savanah Robb; 2017 2:49:40 PM   · Prevnar 13 Intramuscular Suspension   For: Need for vaccination; Ordered By:Klever Harris; Effective Date:2017; Administered by: Savanah Robb: 2017 2:49:00 PM; Last Updated By: Savanah Robb; 2017 2:49:40 PM    Discussion/Summary  Discussion Summary:   Danielle Haji records rev---will stop short acting insulin--incr Basaglar to 45--instr for dosing given--3 per 3--get labs appt 3mos  Chief Complaint  Chief Complaint Free Text Note Form: DARBY    History of Present Illness  TCM Communication [facility]: The patient is being contacted for follow-up after hospitalization and FOR KETOACIDOSIS  He was hospitalized at [facility]  The date of admission: 2017, date of discharge: 2017  He was discharged to home  Medications reviewed and updated today  He scheduled a follow up appointment  The patient is currently asymptomatic  Communication performed and completed by      Active Problems    1  Abnormal electrocardiogram (794 31) (R94 31)   2  Asthma (493 90) (J45 909)   3   Asthmatic bronchitis with acute exacerbation (493 92) (J45 901)   4  Benign essential hypertension (401 1) (I10)   5  Cervical radiculopathy (723 4) (M54 12)   6  Chronic arthritis (716 90) (M19 90)   7  Controlled diabetes mellitus type II without complication (699 28) (L07 2)   8  Eczema, unspecified eczema   9  Encounter for prostate cancer screening (V76 44) (Z12 5)   10  Fatigue (780 79) (R53 83)   11  Flu vaccine need (V04 81) (Z23)   12  GERD (gastroesophageal reflux disease) (530 81) (K21 9)   13  Gout (274 9) (M10 9)   14  Hip pain, right (719 45) (M25 551)   15  Hyperlipemia, mixed (272 2) (E78 2)   16  Ketoacidosis due to diabetes (250 10) (E13 10)   17  Neck pain (723 1) (M54 2)   18  Peripheral neuropathy (356 9) (G62 9)   19  Positive depression screening (796 4) (Z13 89)   20  Primary localized osteoarthritis of hips, bilateral (715 15) (M16 0)   21  Primary localized osteoarthritis of knees, bilateral (715 16) (M17 0)   22  Restless legs (333 94) (G25 81)   23  Shortness of breath (786 05) (R06 02)   24  Shoulder tendonitis (726 10) (M75 80)   25  Sleep apnea (780 57) (G47 30)    Past Medical History    1  History of Impaired fasting glucose (790 21) (R73 01)   2  History of Knee arthropathy (716 96) (M17 10)   3  History of Knee bursitis (726 60) (M70 50)   4  History of Knee bursitis, left (726 60) (M70 52)   5  History of Knee bursitis, right (726 60) (M70 51)   6  History of Left knee pain (719 46) (M25 562)   7  History of Right knee pain (719 46) (M25 561)    Surgical History    1  History of Knee Arthroscopy (Therapeutic)    Family History  Father    1  Family history of Coronary Artery Disease (V17 49)  Uncle    2  Family history of malignant neoplasm (V16 9) (Z80 9)    Social History    · Alcohol use (V49 89) (Z78 9)   · Never A Smoker    Current Meds   1  Allopurinol 100 MG Oral Tablet; take 2 tablets by mouth once daily; Therapy: 42TRM1359 to (Evaluate:93Lry2923)  Requested for: 49EIQ0613; Last   Rx:27Vtg1963 Ordered   2   Atorvastatin Calcium 20 MG Oral Tablet; take 1 tablet by mouth once daily; Therapy: 10QVN4751 to (Evaluate:24Jun2017)  Requested for: 37Qbd4649; Last   Rx:86Lvx5192; Status: ACTIVE - Retrospective By Protocol Authorization Ordered   3  Azithromycin 250 MG Oral Tablet; TAKE 2 TABLETS ON DAY 1 THEN TAKE 1 TABLET A   DAY FOR 4 DAYS; Therapy: 97Xee5680 to (Last Rx:13Mtp2414)  Requested for: 60Klf0215 Ordered   4  Diclofenac Sodium  MG Oral Tablet Extended Release 24 Hour; take 1 tablet by   mouth once daily; Therapy: 09BGG9138 to (Evaluate:08Apr2017)  Requested for: 91ITK3488; Last   Rx:42Rah1225 Ordered   5  Dulera 200-5 MCG/ACT Inhalation Aerosol; inhale 2 puffs by mouth twice a day; Therapy: 92Zdt6879 to (Evaluate:22Nov2017)  Requested for: 44Vmh4678; Last   Rx:37Wbi5333; Status: ACTIVE - Retrospective By Protocol Authorization Ordered   6  Gabapentin 100 MG Oral Capsule; 1-2 TID; Therapy: 49Wat2887 to (Last Rx:66Uuz9326)  Requested for: 68Ddx4672 Ordered   7  HydroCHLOROthiazide 12 5 MG Oral Capsule; Take 1 capsule by mouth  daily; Therapy: 67UYO8042 to ([phone])  Requested for: 64Nkq0904; Last   Rx:21Oog7393 Ordered   8  Hydrocodone-Acetaminophen  MG Oral Tablet; 1 q 6hrs prn; Therapy: 54PDW6971 to (Last Rx:56Yle6412) Ordered   9  Indomethacin 25 MG Oral Capsule; TAKE 1-2 CAPSULES 3 TIMES DAILY; Therapy: 65DDL0987 to (Evaluate:28Zrg0398)  Requested for: 53GNE2785; Last   Rx:58Thk6429 Ordered   10  Lisinopril 40 MG Oral Tablet; Take 1 tablet by mouth  daily; Therapy: 76PPR8554 to (Evaluate:26Rgu3034)  Requested for: 82Auv1935; Last    Rx:96Glb6596 Ordered   11  MetFORMIN HCl - 500 MG Oral Tablet; take 1 tablet by mouth twice a day as directed; Therapy: 75YKG3641 to (Evaluate:98Aga6258)  Requested for: 55Fmu9164; Last    Rx:90Knt6645; Status: ACTIVE - Retrospective By Protocol Authorization Ordered   12  Multi-Vitamins Oral Tablet; TAKE 1 TABLET DAILY;     Therapy: 77IPH0225 to Recorded   13  OrthoVisc 30 MG/2ML Intra-articular Solution Prefilled Syringe; INJECT 2  ML    Intra-articular 2 ML's weekly in knee for 3 weeks; Therapy: 45Pam0237 to (Last Rx:29Apr2016) Ordered   14  OrthoVisc 30 MG/2ML Intra-articular Solution Prefilled Syringe; INJECT 2  ML    Intra-articular; To Be Done: 56LFY3721; Status: HOLD FOR - Administration Ordered   15  OrthoVisc 30 MG/2ML Intra-articular Solution Prefilled Syringe; INJECT 2  ML    Intra-articular; To Be Done: 18KRE5619; Status: HOLD FOR - Administration Ordered   16  OrthoVisc 30 MG/2ML Intra-articular Solution Prefilled Syringe; INJECT 2  ML    Intra-articular; To Be Done: 50XUH3113; Status: HOLD FOR - Administration Ordered   17  OrthoVisc 30 MG/2ML Intra-articular Solution Prefilled Syringe; INJECT 4 ML Weekly to    both knees for 3 weeks; Therapy: 07KVJ4530 to (Evaluate:10Feb2017); Last Rx:20Jan2017 Ordered   18  Pennsaid 2 % Transdermal Solution; APPLY 2 PUMPS TO AFFECTED KNEE 2 TIMES    DAILY; Therapy: 96TSX7557 to (Last Rx:03Mar2017)  Requested for: 22TRG6130 Ordered   19  PredniSONE 20 MG Oral Tablet; 1 bid x 5 days , 1 qd x 5 days; Therapy: 79Fyo5694 to (Evaluate:53Yfs6258)  Requested for: 54Ajp4261; Last    Rx:02Aug2017 Ordered   20  ProAir  (90 Base) MCG/ACT Inhalation Aerosol Solution; INHALE 1 TO 2 PUFFS    EVERY 4 TO 6 HOURS AS NEEDED; Therapy: 50JNC1621 to (Evaluate:10Jun2017)  Requested for: 11Apr2017; Last    Rx:11Apr2017; Status: ACTIVE - Retrospective By Protocol Authorization Ordered   21  ROPINIRole HCl - 0 5 MG Oral Tablet; take 1 to 4 tablets by mouth at bedtime; Therapy: 93ACN0567 to (Evaluate:78Tyg9736)  Requested for: 86Oht3652; Last    Rx:00Luj0406; Status: ACTIVE - Retrospective By Protocol Authorization Ordered   22  Synvisc 16 MG/2ML Intra-articular Solution Prefilled Syringe; As directed bilaterally; Therapy: 88AXV7447 to (Last Rx:29Mar2016) Ordered   23   Ventolin  (90 Base) MCG/ACT Inhalation Aerosol Solution; inhale 1 to 2 puffs    every 4 to 6 hours if needed; Therapy: 62WGT7762 to (Evaluate:15Prx8375)  Requested for: 71Xzo0301; Last    Rx:69Bmr8213; Status: ACTIVE - Retrospective By Protocol Authorization Ordered    Allergies    1  No Known Drug Allergies    Vitals  Signs   Recorded: 30Aug2017 02:46PM   Heart Rate: 78  Systolic: 331  Diastolic: 88  Height: 5 ft 11 in  Weight: 310 lb   BMI Calculated: 43 24  BSA Calculated: 2 54  O2 Saturation: 98    Physical Exam    Constitutional   General appearance: No acute distress, well appearing and well nourished  Pulmonary   Auscultation of lungs: Clear to auscultation, equal breath sounds bilaterally, no wheezes, no rales, no rhonci  Cardiovascular   Auscultation of heart: Normal rate and rhythm, normal S1 and S2, without murmurs  Abdomen   Abdomen: Non-tender, no masses  Health Management  Controlled diabetes mellitus type II without complication   *VB - Eye Exam; every 1 year; Next Due: 75XZB6382; Overdue  *VB - Foot Exam; every 1 year; Last 37TUP5114; Next Due: 90YDV5729;  Active    Signatures   Electronically signed by : Stalin Staton MD; Aug 30 2017  3:03PM EST                       (Author)

## 2018-01-16 NOTE — PROGRESS NOTES
Assessment    1  Controlled diabetes mellitus type II without complication (976 45) (L94 4)   2  Gout (274 9) (M10 9)   3  Hyperlipemia, mixed (272 2) (E78 2)    Plan  Chronic arthritis    · Hydrocodone-Acetaminophen 5-325 MG Oral Tablet; 1 TABLET Q 6-8 HOURS  PRN PAIN    Discussion/Summary    Keep diary---f/u 2wks---if #s controlled try to taper Levemir  The patient was counseled regarding diagnostic results, instructions for management, prognosis, risks and benefits of treatment options, importance of compliance with treatment  total time of encounter was 27 minutes  Chief Complaint  1 week follow up from 1/25/16 OV ([facility])      History of Present Illness  initial sugars 250-400---now 170-250---      Review of Systems    Constitutional: No fever or chills, feels well, no tiredness, no recent weight gain or weight loss  Cardiovascular: No complaints of slow heart rate, no fast heart rate, no chest pain, no palpitations, no leg claudication, no lower extremity  Respiratory: No complaints of shortness of breath, no wheezing, no cough, no SOB on exertion, no orthopnea or PND  Active Problems    1  Abnormal electrocardiogram (794 31) (R94 31)   2  Asthma (493 90) (J45 909)   3  Benign essential hypertension (401 1) (I10)   4  Chronic arthritis (716 90) (M19 90)   5  Controlled diabetes mellitus type II without complication (625 59) (Q72 1)   6  Eczema, unspecified eczema (692 9) (L30 9)   7  Flu vaccine need (V04 81) (Z23)   8  GERD (gastroesophageal reflux disease) (530 81) (K21 9)   9  Gout (274 9) (M10 9)   10  Hyperlipemia, mixed (272 2) (E78 2)   11  Impaired fasting glucose (790 21) (R73 01)   12  Neck pain (723 1) (M54 2)   13  Restless legs (333 94) (G25 81)   14  Shortness of breath (786 05) (R06 02)   15  Sleep apnea (780 57) (G47 30)    Past Medical History    The active problems and past medical history were reviewed and updated today  Family History    1   Family history of Coronary Artery Disease (V17 49)    The family history was reviewed and updated today  Social History    · Never A Smoker  The social history was reviewed and updated today  Current Meds   1  Allopurinol 100 MG Oral Tablet; 2 tabs daily; Therapy: 86AIV9115 to (Last Rx:01Foi6014)  Requested for: 08VGU7366 Ordered   2  Atorvastatin Calcium 20 MG Oral Tablet; TAKE 1 TABLET DAILY; Therapy: 68LRV8681 to (Evaluate:15Jan2016) Recorded   3  Diclofenac Sodium  MG Oral Tablet Extended Release 24 Hour; Take 1 tablet   daily; Therapy: 33SIE3496 to Recorded   4  Dulera 200-5 MCG/ACT Inhalation Aerosol; INHALE 2 PUFFS TWICE DAILY; Therapy: 76Wnz9244 to (Last Rx:24Apr2014) Ordered   5  Fluocinonide 0 05 % External Cream; APPLY SPARINGLY TO AFFECTED AREA(S) 3   TIMES A DAY; Therapy: 10PFX5544 to (Evaluate:25Jan2016)  Requested for: 01NFN8955; Last   Rx:63Bmq5791 Ordered   6  Hydrochlorothiazide 12 5 MG Oral Capsule; take 1 tablet by mouth daily; Therapy: 31XKK1286 to (Jose Francisco Mejia)  Requested for: 02Jan2015; Last   Rx:02Jan2015 Ordered   7  Hydrocodone-Acetaminophen 5-325 MG Oral Tablet; 1 TABLET Q 6-8 HOURS PRN PAIN;   Therapy: 38OIS4157 to (Last Rx:27Oct2015) Ordered   8  Indomethacin 25 MG Oral Capsule; TAKE 1-2 CAPSULES 3 TIMES DAILY; Therapy: 51XDR7666 to (Evaluate:30Dec2015)  Requested for: 77WME7886; Last   Rx:24Goz3332 Ordered   9  Lisinopril 40 MG Oral Tablet; TAKE 1 TABLET DAILY; Therapy: 61LPV6395 to (Evaluate:01Sep2014) Recorded   10  MetFORMIN HCl - 500 MG Oral Tablet; TAKE 1 TABLET TWICE DAILY AS DIRECTED; Therapy: 71NOG5560 to (Evaluate:01Sep2014) Recorded   11  Multi-Vitamins Oral Tablet; TAKE 1 TABLET DAILY; Therapy: 59FMS3804 to Recorded   12  Omeprazole 20 MG Oral Capsule Delayed Release; TAKE ONE CAPSULE BY MOUTH    EVERY DAY; Therapy: 44KEY8804 to (Last Rx:25Jan2016)  Requested for: 09HZK4182 Ordered   13   ProAir  (90 Base) MCG/ACT Inhalation Aerosol Solution; INHALE 1 TO 2 PUFFS    EVERY 4 TO 6 HOURS AS NEEDED; Therapy: 59NKW9341 to ([place]) Recorded   14  ROPINIRole HCl - 0 5 MG Oral Tablet; 1-4 HS; Therapy: 18CKV4092 to (Evaluate:25Mar2016)  Requested for: 68XSC6498; Last    Rx:25Jan2016 Ordered    Allergies    1  No Known Drug Allergies    Vitals  Vital Signs [Data Includes: Current Encounter]    Recorded: 03PPK5745 07:50AM   Heart Rate 120   Respiration 20   Systolic 876   Diastolic 78   Height 5 ft 11 in   Weight 327 lb    BMI Calculated 45 61   BSA Calculated 2 6     Physical Exam    Constitutional   General appearance: No acute distress, well appearing and well nourished  Ears, Nose, Mouth, and Throat   External inspection of ears and nose: Normal     Otoscopic examination: Tympanic membrance translucent with normal light reflex  Canals patent without erythema  Nasal mucosa, septum, and turbinates: Normal without edema or erythema  Oropharynx: Normal with no erythema, edema, exudate or lesions  Pulmonary   Auscultation of lungs: Clear to auscultation, equal breath sounds bilaterally, no wheezes, no rales, no rhonci  Abdomen   Abdomen: Non-tender, no masses      Musculoskeletal   Inspection/palpation of joints, bones, and muscles: Normal          Signatures   Electronically signed by : Nereyda Guadarrama MD; Feb 1 2016  8:11AM EST                       (Author)

## 2018-01-16 NOTE — PROGRESS NOTES
Assessment    1  GERD (gastroesophageal reflux disease) (530 81) (K21 9)   2  Controlled diabetes mellitus type II without complication (932 88) (F58 1)    Plan  Controlled diabetes mellitus type II without complication    · Follow-up visit in 1 week Evaluation and Treatment  Follow-up  Status: Hold For -  Scheduling  Requested for: 25Jan2016  GERD (gastroesophageal reflux disease)    · Omeprazole 20 MG Oral Capsule Delayed Release (PriLOSEC); TAKE ONE  CAPSULE BY MOUTH EVERY DAY  Restless legs    · From  ROPINIRole HCl - 0 25 MG Oral Tablet TAKE 1 TABLET Bedtime To  ROPINIRole HCl - 0 5 MG Oral Tablet (ROPINIRole HCl) 1-4 HS    Discussion/Summary    Start Levemir 10 units and Invokana 100---diary--appt 1wk  The patient was counseled regarding diagnostic results, instructions for management, risk factor reductions, prognosis, risks and benefits of treatment options, importance of compliance with treatment  total time of encounter was 25 minutes and 15 minutes was spent counseling  Chief Complaint  sugars running high insce fri pm----ranges from 244-543      History of Present Illness  HPI: see cc      Review of Systems    Constitutional: feeling poorly and feeling tired, but no fever or chills, feels well, no tiredness, no recent weight loss or weight gain  ENT: no complaints of earache, no loss of hearing, no nosebleeds or nasal discharge, no sore throat or hoarseness  Cardiovascular: no complaints of slow or fast heart rate, no chest pain, no palpitations, no leg claudication or lower extremity edema  Respiratory: cough, but no complaints of shortness of breath, no wheezing or cough, no dyspnea on exertion, no orthopnea or PND  Active Problems    1  Abnormal electrocardiogram (794 31) (R94 31)   2  Asthma (493 90) (J45 909)   3  Benign essential hypertension (401 1) (I10)   4  Chronic arthritis (716 90) (M19 90)   5  Eczema, unspecified eczema (692 9) (L30 9)   6   Flu vaccine need (V04 81) (Z23)   7  Gout (274 9) (M10 9)   8  Hyperlipemia, mixed (272 2) (E78 2)   9  Impaired fasting glucose (790 21) (R73 01)   10  Neck pain (723 1) (M54 2)   11  Restless legs (333 94) (G25 81)   12  Shortness of breath (786 05) (R06 02)   13  Sleep apnea (780 57) (G47 30)    Past Medical History  Active Problems And Past Medical History Reviewed: The active problems and past medical history were reviewed and updated today  Family History    1  Family history of Coronary Artery Disease (V17 49)  Family History Reviewed: The family history was reviewed and updated today  Social History    · Never A Smoker  The social history was reviewed and updated today  Current Meds   1  Allopurinol 100 MG Oral Tablet; 2 tabs daily; Therapy: 74GHE5810 to (Last Rx:06Nkm7763)  Requested for: 68VDP5391 Ordered   2  Atorvastatin Calcium 20 MG Oral Tablet; TAKE 1 TABLET DAILY; Therapy: 89XHK1249 to (Evaluate:15Jan2016) Recorded   3  Diclofenac Sodium  MG Oral Tablet Extended Release 24 Hour; Take 1 tablet   daily; Therapy: 40VOV5332 to Recorded   4  Dulera 200-5 MCG/ACT Inhalation Aerosol; INHALE 2 PUFFS TWICE DAILY; Therapy: 43Nrp1701 to (Last Rx:24Apr2014) Ordered   5  Fluocinonide 0 05 % External Cream; APPLY SPARINGLY TO AFFECTED AREA(S) 3   TIMES A DAY; Therapy: 80KPP1847 to (Evaluate:25Jan2016)  Requested for: 67EMR7285; Last   Rx:75Uon2914 Ordered   6  Hydrochlorothiazide 12 5 MG Oral Capsule; take 1 tablet by mouth daily; Therapy: 09OEW1408 to (Emelia Watkins)  Requested for: 02Jan2015; Last   Rx:02Jan2015 Ordered   7  Hydrocodone-Acetaminophen 5-325 MG Oral Tablet; 1 TABLET Q 6-8 HOURS PRN   PAIN;   Therapy: 92CBD5867 to (Last Rx:27Oct2015) Ordered   8  Indomethacin 25 MG Oral Capsule; TAKE 1-2 CAPSULES 3 TIMES DAILY; Therapy: 10QDL8548 to (Evaluate:57Vqo5374)  Requested for: 11KCC1462; Last   Rx:30Tcc9658 Ordered   9  Lisinopril 40 MG Oral Tablet; TAKE 1 TABLET DAILY;    Therapy: 60LKK2545 to (Evaluate:01Sep2014) Recorded   10  MetFORMIN HCl - 500 MG Oral Tablet; TAKE 1 TABLET TWICE DAILY AS DIRECTED; Therapy: 39AUQ0814 to (Evaluate:01Sep2014) Recorded   11  Multi-Vitamins Oral Tablet; TAKE 1 TABLET DAILY; Therapy: 35IVD0649 to Recorded   12  ProAir  (90 Base) MCG/ACT Inhalation Aerosol Solution; INHALE 1 TO 2 PUFFS    EVERY 4 TO 6 HOURS AS NEEDED; Therapy: 21GUW2668 to (Vicki Swan) Recorded   13  ROPINIRole HCl - 0 25 MG Oral Tablet; TAKE 1 TABLET Bedtime; Therapy: 41VZB7088 to (Evaluate:68Rdy6898)  Requested for: [id number]; Last    Rx:27Oct2015 Ordered    Allergies    1  No Known Drug Allergies    Vitals   Recorded: 32KRO5936 63:88NC   Systolic 387   Diastolic 88   Height 5 ft 11 in   Weight 329 lb    BMI Calculated 45 89   BSA Calculated 2 61     Physical Exam    Constitutional   General appearance: No acute distress, well appearing and well nourished  Pulmonary   Auscultation of lungs: Clear to auscultation, equal breath sounds bilaterally, no wheezes, no rales, no rhonci  Cardiovascular   Auscultation of heart: Normal rate and rhythm, normal S1 and S2, without murmurs  Abdomen   Abdomen: Non-tender, no masses           Signatures   Electronically signed by : Wilfredo Corral MD; Jan 25 2016  3:31PM EST                       (Author)

## 2018-01-17 LAB
A/G RATIO (HISTORICAL): 1.7 (ref 1.2–2.2)
ALBUMIN SERPL BCP-MCNC: 4.3 G/DL (ref 3.5–5.5)
ALP SERPL-CCNC: 83 IU/L (ref 39–117)
ALT SERPL W P-5'-P-CCNC: 22 IU/L (ref 0–44)
AST SERPL W P-5'-P-CCNC: 14 IU/L (ref 0–40)
BILIRUB SERPL-MCNC: 0.5 MG/DL (ref 0–1.2)
BUN SERPL-MCNC: 20 MG/DL (ref 6–24)
BUN/CREA RATIO (HISTORICAL): 25 (ref 9–20)
CALCIUM SERPL-MCNC: 9.2 MG/DL (ref 8.7–10.2)
CHLORIDE SERPL-SCNC: 101 MMOL/L (ref 96–106)
CHOLEST SERPL-MCNC: 133 MG/DL (ref 100–199)
CO2 SERPL-SCNC: 27 MMOL/L (ref 18–29)
CREAT SERPL-MCNC: 0.8 MG/DL (ref 0.76–1.27)
EGFR AFRICAN AMERICAN (HISTORICAL): 117 ML/MIN/1.73
EGFR-AMERICAN CALC (HISTORICAL): 101 ML/MIN/1.73
GLUCOSE SERPL-MCNC: 99 MG/DL (ref 65–99)
HBA1C MFR BLD HPLC: 5.6 % (ref 4.8–5.6)
HDLC SERPL-MCNC: 50 MG/DL
LDL/HDL RATIO (HISTORICAL): 1.2 RATIO UNITS (ref 0–3.6)
LDLC SERPL CALC-MCNC: 59 MG/DL (ref 0–99)
POTASSIUM SERPL-SCNC: 4.3 MMOL/L (ref 3.5–5.2)
SODIUM SERPL-SCNC: 142 MMOL/L (ref 134–144)
TOT. GLOBULIN, SERUM (HISTORICAL): 2.6 G/DL (ref 1.5–4.5)
TOTAL PROTEIN (HISTORICAL): 6.9 G/DL (ref 6–8.5)
TRIGL SERPL-MCNC: 121 MG/DL (ref 0–149)
URIC ACID (HISTORICAL): 6 MG/DL (ref 3.7–8.6)
VLDLC SERPL CALC-MCNC: 24 MG/DL (ref 5–40)

## 2018-01-18 LAB
BASOPHILS # BLD AUTO: 0.1 X10E3/UL (ref 0–0.2)
BASOPHILS # BLD AUTO: 1 %
DEPRECATED RDW RBC AUTO: 13.4 % (ref 12.3–15.4)
EOSINOPHIL # BLD AUTO: 0.3 X10E3/UL (ref 0–0.4)
EOSINOPHIL # BLD AUTO: 5 %
HCT VFR BLD AUTO: 42.7 % (ref 37.5–51)
HGB BLD-MCNC: 14.6 G/DL (ref 13–17.7)
LYME IGG/IGM AB (HISTORICAL): <0.91 ISR (ref 0–0.9)
LYMPHOCYTES # BLD AUTO: 2.5 X10E3/UL (ref 0.7–3.1)
LYMPHOCYTES # BLD AUTO: 46 %
MCH RBC QN AUTO: 29.1 PG (ref 26.6–33)
MCHC RBC AUTO-ENTMCNC: 34.2 G/DL (ref 31.5–35.7)
MCV RBC AUTO: 85 FL (ref 79–97)
MONOCYTES # BLD AUTO: 0.6 X10E3/UL (ref 0.1–0.9)
MONOCYTES (HISTORICAL): 11 %
NEUTROPHILS # BLD AUTO: 2 X10E3/UL (ref 1.4–7)
NEUTROPHILS # BLD AUTO: 37 %
PLATELET # BLD AUTO: 160 X10E3/UL (ref 150–379)
PLATELET COMMENT (HISTORICAL): ADEQUATE
RBC (HISTORICAL): 5.01 X10E6/UL (ref 4.14–5.8)
RBC COMMENT (HISTORICAL): NORMAL
WBC # BLD AUTO: 5.4 X10E3/UL (ref 3.4–10.8)

## 2018-01-18 NOTE — RESULT NOTES
Verified Results  (1) COMPREHENSIVE METABOLIC PANEL 47BOY4721 55:30[facility]     Test Name Result Flag Reference   Glucose, Serum 131 mg/dL H 65-99   BUN 19 mg/dL  6-24   Creatinine, Serum 0 70 mg/dL L 0 76-1 27   eGFR If NonAfricn Am 109 mL/min/1 73  >59   eGFR If Africn Am 125 mL/min/1 73  >59   BUN/Creatinine Ratio 27 H 9-20   Sodium, Serum 140 mmol/L  134-144   Potassium, Serum 4 2 mmol/L  3 5-5 2   Chloride, Serum 98 mmol/L     Carbon Dioxide, Total 28 mmol/L  18-29   Calcium, Serum 9 1 mg/dL  8 7-10 2   Protein, Total, Serum 6 6 g/dL  6 0-8 5   Albumin, Serum 4 3 g/dL  3 5-5 5   Globulin, Total 2 3 g/dL  1 5-4 5   A/G Ratio 1 9  1 1-2 5   Bilirubin, Total 0 2 mg/dL  0 0-1 2   Alkaline Phosphatase, S 90 IU/L     AST (SGOT) 21 IU/L  0-40   ALT (SGPT) 36 IU/L  0-44     (1) HEMOGLOBIN A1C [id number] 07:28[facility]     Test Name Result Flag Reference   Hemoglobin A1c 6 6 % H 4 8-5 6   Pre-diabetes: 5 7 - 6 4           Diabetes: >6 4           Glycemic control for adults with diabetes: <7 0     (1) LIPID PANEL FASTING W DIRECT LDL REFLEX 49Byb0205 07:28[facility]     Test Name Result Flag Reference   Cholesterol, Total 198 mg/dL  100-199   Triglycerides 237 mg/dL H 0-149   HDL Cholesterol 52 mg/dL  >39   According to ATP-III Guidelines, HDL-C >59 mg/dL is considered a  negative risk factor for CHD     LDL Cholesterol Calc 99 mg/dL  0-99     (1) URIC ACID 32Gox7227 07:28[facility]     Test Name Result Flag Reference   Uric Acid, Serum 5 5 mg/dL  3 7-8 6   Therapeutic target for gout patients: <6 0

## 2018-01-19 ENCOUNTER — GENERIC CONVERSION - ENCOUNTER (OUTPATIENT)
Dept: OTHER | Facility: OTHER | Age: 53
End: 2018-01-19

## 2018-01-22 VITALS
BODY MASS INDEX: 43.4 KG/M2 | OXYGEN SATURATION: 98 % | DIASTOLIC BLOOD PRESSURE: 88 MMHG | HEART RATE: 78 BPM | SYSTOLIC BLOOD PRESSURE: 136 MMHG | WEIGHT: 310 LBS | HEIGHT: 71 IN

## 2018-01-23 NOTE — RESULT NOTES
Verified Results  (1) HEMOGLOBIN A1C [id number] 06:46AM [facility]     Test Name Result Flag Reference   Hemoglobin A1c 5 6 %  4 8-5 6   Pre-diabetes: 5 7 - 6 4           Diabetes: >6 4           Glycemic control for adults with diabetes: <7 0     () Lipid Panel With LDL/HDL Ratio [id number] 06:46AM [facility]     Test Name Result Flag Reference   Cholesterol, Total 133 mg/dL  100-199   Triglycerides 121 mg/dL  0-149   HDL Cholesterol 50 mg/dL  >39   VLDL Cholesterol Gallito 24 mg/dL  5-40   LDL Cholesterol Calc 59 mg/dL  0-99   LDL/HDL Ratio 1 2 ratio units  0 0-3 6   LDL/HDL Ratio                                                             Men  Women                                               1/2 Avg  Risk  1 0    1 5                                                   Avg Risk  3 6    3 2                                                2X Avg  Risk  6 2    5 0                                                3X Avg  Risk  8 0    6 1     (1) COMPREHENSIVE METABOLIC PANEL [id number] 05:25[facility]     Test Name Result Flag Reference   Glucose, Serum 99 mg/dL  65-99   BUN 20 mg/dL  6-24   Creatinine, Serum 0 80 mg/dL  0 76-1 27   BUN/Creatinine Ratio 25 H 9-20   Sodium, Serum 142 mmol/L  134-144   Potassium, Serum 4 3 mmol/L  3 5-5 2   Chloride, Serum 101 mmol/L     Carbon Dioxide, Total 27 mmol/L  18-29   Calcium, Serum 9 2 mg/dL  8 7-10 2   Protein, Total, Serum 6 9 g/dL  6 0-8 5   Albumin, Serum 4 3 g/dL  3 5-5 5   Globulin, Total 2 6 g/dL  1 5-4 5   A/G Ratio 1 7  1 2-2 2   Bilirubin, Total 0 5 mg/dL  0 0-1 2   Alkaline Phosphatase, S 83 IU/L     AST (SGOT) 14 IU/L  0-40   ALT (SGPT) 22 IU/L  0-44   eGFR If NonAfricn Am 101 mL/min/1 73  >59   eGFR If Africn Am 117 mL/min/1 73  >59     (1) URIC ACID [id number] 06:46AM [facility]     Test Name Result Flag Reference   Uric Acid, Serum 6 0 mg/dL  3 7-8 6   Therapeutic target for gout patients: <6 0     ([facility] [facility]) CBC+Platelet+Hem Review [id number] 06: 45AM [employer] [facility]     Test Name Result Flag Reference   WBC 5 4 x10E3/uL  3 4-10 8   RBC 5 01 x10E6/uL  4 14-5 80   Hemoglobin 14 6 g/dL  13 0-17 7   Hematocrit 42 7 %  37 5-51 0   MCV 85 fL  79-97   MCH 29 1 pg  26 6-33 0   MCHC 34 2 g/dL  31 5-35 7   RDW 13 4 %  12 3-15 4   Platelets 039 B51C8/XX  150-379   Neutrophils 37 %  Not Estab  Lymphs 46 %  Not Estab  Monocytes 11 %  Not Estab  Eos 5 %  Not Estab  Basos 1 %  Not Estab     Neutrophils Absolute 2 0 X10E3/uL  1 4-7 0   Lymphs (Absolute) 2 5 X10E3/uL  0 7-3 1   Monocytes(Absolute) 0 6 X10E3/uL  0 1-0 9   Eos (Absolute Value) 0 3 X10E3/uL  0 0-0 4   Baso(Absolute) 0 1 X10E3/uL  0 0-0 2   RBC Comment RBC's appear normal   Normal   Platelet Comment Adequate  Adequate     (LC) Lyme, Total Ab Test/Reflex [id number] 06:45AM [employer] [facility]     Test Name Result Flag Reference   Lyme IgG/IgM Ab <0 91 ISR  0 00-0 90   Negative         <0 91                                                 Equivocal  0 91 - 1 09                                                 Positive         >1 09

## 2018-01-24 ENCOUNTER — TELEPHONE (OUTPATIENT)
Dept: FAMILY MEDICINE CLINIC | Facility: CLINIC | Age: 53
End: 2018-01-24

## 2018-01-31 ENCOUNTER — OFFICE VISIT (OUTPATIENT)
Dept: FAMILY MEDICINE CLINIC | Facility: CLINIC | Age: 53
End: 2018-01-31
Payer: COMMERCIAL

## 2018-01-31 VITALS
SYSTOLIC BLOOD PRESSURE: 136 MMHG | DIASTOLIC BLOOD PRESSURE: 84 MMHG | WEIGHT: 310 LBS | OXYGEN SATURATION: 90 % | HEART RATE: 96 BPM | BODY MASS INDEX: 43.24 KG/M2

## 2018-01-31 DIAGNOSIS — Z00.00 WELLNESS EXAMINATION: ICD-10-CM

## 2018-01-31 DIAGNOSIS — E78.01 FAMILIAL HYPERCHOLESTEROLEMIA: Chronic | ICD-10-CM

## 2018-01-31 DIAGNOSIS — I10 HYPERTENSION, UNSPECIFIED TYPE: Chronic | ICD-10-CM

## 2018-01-31 DIAGNOSIS — G89.4 CHRONIC PAIN SYNDROME: Primary | ICD-10-CM

## 2018-01-31 PROCEDURE — 99214 OFFICE O/P EST MOD 30 MIN: CPT | Performed by: FAMILY MEDICINE

## 2018-01-31 PROCEDURE — 99396 PREV VISIT EST AGE 40-64: CPT | Performed by: FAMILY MEDICINE

## 2018-01-31 RX ORDER — INDOMETHACIN 25 MG/1
CAPSULE ORAL EVERY 8 HOURS
COMMUNITY
Start: 2015-12-15

## 2018-01-31 RX ORDER — DICLOFENAC SODIUM 75 MG/1
75 TABLET, DELAYED RELEASE ORAL 2 TIMES DAILY
Qty: 60 TABLET | Refills: 5 | Status: SHIPPED | OUTPATIENT
Start: 2018-01-31 | End: 2018-02-02 | Stop reason: SDUPTHER

## 2018-01-31 RX ORDER — HYDROCODONE BITARTRATE AND ACETAMINOPHEN 10; 325 MG/1; MG/1
1 TABLET ORAL EVERY 6 HOURS PRN
Qty: 90 TABLET | Refills: 0 | Status: SHIPPED | OUTPATIENT
Start: 2018-01-31 | End: 2018-04-02 | Stop reason: SDUPTHER

## 2018-01-31 RX ORDER — PREDNISONE 20 MG/1
TABLET ORAL
Qty: 15 TABLET | Refills: 0 | Status: SHIPPED | OUTPATIENT
Start: 2018-01-31 | End: 2018-02-02 | Stop reason: SDUPTHER

## 2018-01-31 RX ORDER — HYDROCHLOROTHIAZIDE 12.5 MG/1
12.5 CAPSULE, GELATIN COATED ORAL DAILY
Refills: 0 | COMMUNITY
Start: 2017-10-24 | End: 2019-03-22 | Stop reason: ALTCHOICE

## 2018-01-31 RX ORDER — GABAPENTIN 100 MG/1
CAPSULE ORAL
COMMUNITY
Start: 2016-09-02

## 2018-01-31 RX ORDER — ALBUTEROL SULFATE 90 UG/1
2 AEROSOL, METERED RESPIRATORY (INHALATION)
COMMUNITY
Start: 2014-03-05 | End: 2018-02-02

## 2018-01-31 RX ORDER — ALBUTEROL SULFATE 90 UG/1
2 AEROSOL, METERED RESPIRATORY (INHALATION) EVERY 4 HOURS PRN
Qty: 1 INHALER | Refills: 10 | Status: SHIPPED | OUTPATIENT
Start: 2018-01-31 | End: 2018-02-02 | Stop reason: SDUPTHER

## 2018-01-31 NOTE — PROGRESS NOTES
HPI:  Lavern Berry is a 46 y o  male here for his yearly health maintenance exam    Patient Active Problem List   Diagnosis    Hyponatremia    [facility]    Hyperglycemia due to type 2 diabetes mellitus ([facility]    Weakness generalized    Hypokalemia    Hypertension    Hyperlipidemia    Chronic pain syndrome     Past Medical History:   Diagnosis Date    Chronic pain     [facility]     Hyperlipidemia     Hypertension     Impaired fasting glucose     last assessed: 12/16/2015    Knee arthropathy     last assessed: 3/23/2016     Past Surgical History:   Procedure Laterality Date    KNEE ARTHROSCOPY Bilateral     KNEE SURGERY       Family History   Problem Relation Age of Onset    Coronary artery disease Father     Cancer Other      malignant neoplasm     History   Smoking Status    Never Smoker   Smokeless Tobacco    Never Used     History   Alcohol Use    Yes     Comment: 2 light beers daily      History   Drug Use No     Over the past 2 weeks, patient has had  little interest or pleasure in doing things   not at all  Over the past 2 weeks, patient has felt down, depressed or hopeless not at all  Current Outpatient Prescriptions   Medication Sig Dispense Refill    albuterol (VENTOLIN HFA) 90 mcg/act inhaler Inhale 2 puffs every 4 (four) hours as needed (prn) 1 Inhaler 10    allopurinol (ZYLOPRIM) 100 mg tablet Take 2 tablets by mouth daily 60 tablet 0    atorvastatin (LIPITOR) 20 mg tablet Take by mouth      HYDROcodone-acetaminophen (NORCO)  mg per tablet Take 1 tablet by mouth every 6 (six) hours as needed for moderate pain Max Daily Amount: 4 tablets 90 tablet 0    indomethacin (INDOCIN) 25 mg capsule Take by mouth every 8 (eight) hours      lisinopril (ZESTRIL) 40 mg tablet Take by mouth      metFORMIN (GLUCOPHAGE) 500 mg tablet Take 1 tablet by mouth 2 (two) times a day with meals 60 tablet 0    Mometasone Furo-Formoterol Fum (DULERA) 200-5 MCG/ACT AERO Inhale      Multiple Vitamin (MULTI-VITAMIN DAILY PO) Take 1 tablet by mouth daily      rOPINIRole (REQUIP) 0 5 mg tablet Take 1 tablet by mouth every 12 (twelve) hours 60 tablet 0    albuterol (PROAIR HFA) 90 mcg/act inhaler Inhale 2 puffs      diclofenac (VOLTAREN) 75 mg EC tablet Take 1 tablet (75 mg total) by mouth 2 (two) times a day 60 tablet 5    Diclofenac Sodium (PENNSAID) 2 % SOLN Place on the skin      Diclofenac Sodium  MG 24 hr tablet Take by mouth daily        Diclofenac Sodium  MG 24 hr tablet Take 1 tablet by mouth daily      gabapentin (NEURONTIN) 100 mg capsule Take by mouth      hydrochlorothiazide (MICROZIDE) 12 5 mg capsule Take 12 5 mg by mouth daily  0    hylan (SYNVISC) injection Inject into the joint      Mometasone Furo-Formoterol Fum (DULERA) 200-5 MCG/ACT AERO Inhale 2 puffs 2 (two) times a day      potassium chloride (K-DUR,KLOR-CON) 10 mEq tablet Take 1 tablet by mouth 2 (two) times a day 60 tablet 0    predniSONE 20 mg tablet TAKE 1 TABLET BID X 5 DAYS,  THEN TAKE 1 TABLET QD FOR 5 DAYS 15 tablet 0     No current facility-administered medications for this visit  No Known Allergies  Immunization History   Administered Date(s) Administered    Influenza Quadrivalent Preservative Free 3 years and older IM 12/16/2015    Influenza TIV (IM) 09/03/2014, 09/02/2016, 08/30/2017    Pneumococcal Conjugate 13-Valent 08/30/2017    Pneumococcal Polysaccharide PPV23 08/12/2013       Patient Care Team:  Rhonda Baltazar MD as PCP - MD Miley Agustin MD      Physical Exam :  Physical Exam     Reviewed Updated [facility] Prior Wellness Visits:   Last Health Maintenance visit information was reviewed, patient interviewed , no change since last HM visit yes  Last HM visit information was reviewed, patient interviewed and updates made to the record today yes    Assessment and Plan:  1   Chronic pain syndrome  HYDROcodone-acetaminophen (NORCO)  mg per tablet    diclofenac (VOLTAREN) 75 mg EC tablet    predniSONE 20 mg tablet    albuterol (VENTOLIN HFA) 90 mcg/act inhaler   2  Familial hypercholesterolemia     3  Hypertension, unspecified type     4   Wellness examination         Health Maintenance Due   Topic Date Due    HIV SCREENING  1965    Hepatitis C Screening  1965    DTaP,Tdap,and Td Vaccines (1 - Tdap) 01/06/1971    OPHTHALMOLOGY EXAM  01/06/1974    INFLUENZA VACCINE  09/01/2017

## 2018-01-31 NOTE — PROGRESS NOTES
Assessment/Plan:    No problem-specific Assessment & Plan notes found for this encounter  Diagnoses and all orders for this visit:    Chronic pain syndrome  -     HYDROcodone-acetaminophen (NORCO)  mg per tablet; Take 1 tablet by mouth every 6 (six) hours as needed for moderate pain Max Daily Amount: 4 tablets  -     diclofenac (VOLTAREN) 75 mg EC tablet; Take 1 tablet (75 mg total) by mouth 2 (two) times a day  -     predniSONE 20 mg tablet; TAKE 1 TABLET BID X 5 DAYS,  THEN TAKE 1 TABLET QD FOR 5 DAYS  -     albuterol (VENTOLIN HFA) 90 mcg/act inhaler; Inhale 2 puffs every 4 (four) hours as needed (prn)    Familial hypercholesterolemia    Hypertension, unspecified type    Other orders  -     Discontinue: insulin glargine (BASAGLAR KWIKPEN) injection pen 100 units/mL; Inject 35 Units under the skin daily  -     gabapentin (NEURONTIN) 100 mg capsule; Take by mouth  -     hydrochlorothiazide (MICROZIDE) 12 5 mg capsule; Take 12 5 mg by mouth daily  -     indomethacin (INDOCIN) 25 mg capsule; Take by mouth every 8 (eight) hours  -     Multiple Vitamin (MULTI-VITAMIN DAILY PO); Take 1 tablet by mouth daily  -     Diclofenac Sodium (PENNSAID) 2 % SOLN; Place on the skin  -     hylan (SYNVISC) injection; Inject into the joint  -     Diclofenac Sodium  MG 24 hr tablet; Take 1 tablet by mouth daily  -     Mometasone Furo-Formoterol Fum (DULERA) 200-5 MCG/ACT AERO; Inhale 2 puffs 2 (two) times a day  -     Discontinue: insulin lispro (HUMALOG) 100 units/mL injection; Inject under the skin  -     Discontinue: insulin lispro (HUMALOG KWIKPEN) 100 Units/mL SOPN; Inject under the skin Daily  -     albuterol (PROAIR HFA) 90 mcg/act inhaler; Inhale 2 puffs          Subjective:      Patient ID: Charliene Simmonds is a 46 y o  male      F/u DM/HTN/GERD--stable        The following portions of the patient's history were reviewed and updated as appropriate: allergies, current medications, past family history, past medical history, past social history, past surgical history and problem list     Review of Systems   Constitutional: Negative  Negative for chills, fatigue, fever and unexpected weight change  HENT: Negative  Negative for congestion, ear pain, rhinorrhea, sinus pain and sore throat  Eyes: Negative  Negative for pain, discharge and redness  Respiratory: Negative  Negative for cough, chest tightness, shortness of breath and wheezing  Cardiovascular: Negative  Negative for chest pain, palpitations and leg swelling  Gastrointestinal: Negative for abdominal pain, nausea and vomiting  Endocrine: Negative  Genitourinary: Negative  Negative for dysuria and hematuria  Musculoskeletal: Negative  Negative for arthralgias and myalgias  Allergic/Immunologic: Negative  Neurological: Negative  Negative for dizziness, syncope, speech difficulty, numbness and headaches  Hematological: Negative  Psychiatric/Behavioral: Negative  Negative for agitation, confusion, hallucinations and suicidal ideas  Objective:     Physical Exam   Constitutional: He is oriented to person, place, and time  He appears well-developed and well-nourished  HENT:   Right Ear: Ear canal normal  Tympanic membrane is not injected  Left Ear: Ear canal normal  Tympanic membrane is not injected  Nose: Nose normal    Mouth/Throat: Oropharynx is clear and moist    Eyes: Conjunctivae are normal  Pupils are equal, round, and reactive to light  Neck: Normal range of motion  Neck supple  No thyromegaly present  Cardiovascular: Normal rate, regular rhythm, normal heart sounds and intact distal pulses  Pulmonary/Chest: Effort normal and breath sounds normal  He has no wheezes  Abdominal: Soft  Bowel sounds are normal  There is no tenderness  Musculoskeletal: Normal range of motion  Right shoulder: He exhibits normal strength     Feet:   Right Foot:   Skin Integrity: Negative for ulcer, skin breakdown, erythema, warmth, callus or dry skin  Left Foot:   Skin Integrity: Negative for ulcer, skin breakdown, erythema, warmth, callus or dry skin  Neurological: He is alert and oriented to person, place, and time  He has normal reflexes  Skin: Skin is warm and dry  Psychiatric: He has a normal mood and affect   His behavior is normal  Judgment and thought content normal      Right Foot/Ankle   Right Foot Inspection  Skin Exam: skin normal and skin intact no dry skin, no warmth, no callus, no erythema, no maceration, no abnormal color, no pre-ulcer, no ulcer and no callus                            Sensory   Vibration: intact  Proprioception: intact   Monofilament testing: intact  Vascular  Capillary refills: < 3 seconds      Left Foot/Ankle  Left Foot Inspection  Skin Exam: skin normal and skin intactno dry skin, no warmth, no erythema, no maceration, normal color, no pre-ulcer, no ulcer and no callus                                         Sensory   Vibration: intact  Proprioception: intact  Monofilament: intact  Vascular  Capillary refills: < 3 seconds    Assign Risk Category:  ; ;        Risk: 0

## 2018-02-02 DIAGNOSIS — G89.4 CHRONIC PAIN SYNDROME: ICD-10-CM

## 2018-02-02 RX ORDER — DICLOFENAC SODIUM 75 MG/1
75 TABLET, DELAYED RELEASE ORAL 2 TIMES DAILY
Qty: 60 TABLET | Refills: 0 | Status: SHIPPED | OUTPATIENT
Start: 2018-02-02 | End: 2018-04-02 | Stop reason: SDUPTHER

## 2018-02-02 RX ORDER — PREDNISONE 20 MG/1
TABLET ORAL
Qty: 15 TABLET | Refills: 0 | Status: SHIPPED | OUTPATIENT
Start: 2018-02-02 | End: 2019-03-08 | Stop reason: SDUPTHER

## 2018-02-02 RX ORDER — ALBUTEROL SULFATE 90 UG/1
2 AEROSOL, METERED RESPIRATORY (INHALATION) EVERY 4 HOURS PRN
Qty: 1 INHALER | Refills: 0 | Status: SHIPPED | OUTPATIENT
Start: 2018-02-02 | End: 2018-04-16 | Stop reason: SDUPTHER

## 2018-02-05 DIAGNOSIS — E11.9 TYPE 2 DIABETES MELLITUS WITHOUT COMPLICATION, WITHOUT LONG-TERM CURRENT USE OF INSULIN (HCC): Primary | ICD-10-CM

## 2018-02-17 DIAGNOSIS — M10.9 GOUT, UNSPECIFIED CAUSE, UNSPECIFIED CHRONICITY, UNSPECIFIED SITE: Primary | ICD-10-CM

## 2018-02-19 RX ORDER — ALLOPURINOL 100 MG/1
200 TABLET ORAL DAILY
Qty: 60 TABLET | Refills: 0 | Status: SHIPPED | OUTPATIENT
Start: 2018-02-19 | End: 2018-03-18 | Stop reason: SDUPTHER

## 2018-02-19 NOTE — TELEPHONE ENCOUNTER
Looks like he saw TW 1/31- I do not see this RX on med list at that time   Please review and send to [facility]

## 2018-02-24 DIAGNOSIS — E03.9 ACQUIRED HYPOTHYROIDISM: Primary | ICD-10-CM

## 2018-02-24 RX ORDER — LISINOPRIL 40 MG/1
TABLET ORAL
Qty: 90 TABLET | Refills: 0 | Status: SHIPPED | OUTPATIENT
Start: 2018-02-24 | End: 2018-06-09 | Stop reason: SDUPTHER

## 2018-03-18 DIAGNOSIS — M10.9 GOUT, UNSPECIFIED CAUSE, UNSPECIFIED CHRONICITY, UNSPECIFIED SITE: ICD-10-CM

## 2018-03-19 RX ORDER — ALLOPURINOL 100 MG/1
TABLET ORAL
Qty: 60 TABLET | Refills: 3 | Status: SHIPPED | OUTPATIENT
Start: 2018-03-19 | End: 2018-07-23 | Stop reason: SDUPTHER

## 2018-04-02 DIAGNOSIS — G89.4 CHRONIC PAIN SYNDROME: ICD-10-CM

## 2018-04-02 RX ORDER — DICLOFENAC SODIUM 75 MG/1
75 TABLET, DELAYED RELEASE ORAL 2 TIMES DAILY
Qty: 60 TABLET | Refills: 1 | Status: SHIPPED | OUTPATIENT
Start: 2018-04-02 | End: 2018-06-20 | Stop reason: SDUPTHER

## 2018-04-02 RX ORDER — HYDROCODONE BITARTRATE AND ACETAMINOPHEN 10; 325 MG/1; MG/1
1 TABLET ORAL EVERY 6 HOURS PRN
Qty: 90 TABLET | Refills: 0 | Status: SHIPPED | OUTPATIENT
Start: 2018-04-02 | End: 2018-05-23 | Stop reason: SDUPTHER

## 2018-04-16 DIAGNOSIS — G89.4 CHRONIC PAIN SYNDROME: ICD-10-CM

## 2018-04-29 DIAGNOSIS — E78.2 MIXED HYPERLIPIDEMIA: Primary | ICD-10-CM

## 2018-05-01 DIAGNOSIS — G25.81 RESTLESS LEG: Primary | ICD-10-CM

## 2018-05-01 RX ORDER — ATORVASTATIN CALCIUM 20 MG/1
TABLET, FILM COATED ORAL
Qty: 90 TABLET | Refills: 0 | Status: SHIPPED | OUTPATIENT
Start: 2018-05-01 | End: 2018-07-23 | Stop reason: SDUPTHER

## 2018-05-01 RX ORDER — ROPINIROLE 0.5 MG/1
TABLET, FILM COATED ORAL
Qty: 120 TABLET | Refills: 0 | Status: SHIPPED | OUTPATIENT
Start: 2018-05-01 | End: 2019-02-19 | Stop reason: SDUPTHER

## 2018-05-23 DIAGNOSIS — G89.4 CHRONIC PAIN SYNDROME: ICD-10-CM

## 2018-05-25 NOTE — TELEPHONE ENCOUNTER
PT IN OFFICE TODAY TO  RX - INFORMED PT THAT WE LEFT HIM A VM 2 DAYS AGO LETTING HIM KNOW THAT TW WAS OUT OF OFFICE UNTIL 5/29

## 2018-05-29 RX ORDER — HYDROCODONE BITARTRATE AND ACETAMINOPHEN 10; 325 MG/1; MG/1
1 TABLET ORAL EVERY 6 HOURS PRN
Qty: 90 TABLET | Refills: 0 | Status: SHIPPED | OUTPATIENT
Start: 2018-05-29 | End: 2018-06-29 | Stop reason: SDUPTHER

## 2018-06-09 DIAGNOSIS — E03.9 ACQUIRED HYPOTHYROIDISM: ICD-10-CM

## 2018-06-11 DIAGNOSIS — E11.9 TYPE 2 DIABETES MELLITUS WITHOUT COMPLICATION, WITHOUT LONG-TERM CURRENT USE OF INSULIN (HCC): Primary | ICD-10-CM

## 2018-06-11 DIAGNOSIS — I10 BENIGN ESSENTIAL HYPERTENSION: ICD-10-CM

## 2018-06-11 DIAGNOSIS — E78.2 MIXED HYPERLIPIDEMIA: ICD-10-CM

## 2018-06-11 PROCEDURE — 4010F ACE/ARB THERAPY RXD/TAKEN: CPT | Performed by: FAMILY MEDICINE

## 2018-06-11 RX ORDER — LISINOPRIL 40 MG/1
TABLET ORAL
Qty: 90 TABLET | Refills: 0 | Status: SHIPPED | OUTPATIENT
Start: 2018-06-11 | End: 2019-05-02 | Stop reason: SDUPTHER

## 2018-06-11 NOTE — TELEPHONE ENCOUNTER
Please approve    Due MM labs 7/2018 - called and spoke to pt - lab slip to [facility], pt will call in July after labs for ov

## 2018-06-17 DIAGNOSIS — E03.9 ACQUIRED HYPOTHYROIDISM: ICD-10-CM

## 2018-06-17 DIAGNOSIS — E11.9 TYPE 2 DIABETES MELLITUS WITHOUT COMPLICATION, WITHOUT LONG-TERM CURRENT USE OF INSULIN (HCC): ICD-10-CM

## 2018-06-18 PROCEDURE — 4010F ACE/ARB THERAPY RXD/TAKEN: CPT | Performed by: FAMILY MEDICINE

## 2018-06-18 RX ORDER — LISINOPRIL 40 MG/1
TABLET ORAL
Qty: 90 TABLET | Refills: 0 | Status: SHIPPED | OUTPATIENT
Start: 2018-06-18 | End: 2018-08-01

## 2018-06-20 DIAGNOSIS — G89.4 CHRONIC PAIN SYNDROME: ICD-10-CM

## 2018-06-20 RX ORDER — DICLOFENAC SODIUM 75 MG/1
TABLET, DELAYED RELEASE ORAL
Qty: 60 TABLET | Refills: 1 | Status: SHIPPED | OUTPATIENT
Start: 2018-06-20 | End: 2018-08-20 | Stop reason: SDUPTHER

## 2018-06-29 DIAGNOSIS — G89.4 CHRONIC PAIN SYNDROME: ICD-10-CM

## 2018-07-02 RX ORDER — HYDROCODONE BITARTRATE AND ACETAMINOPHEN 10; 325 MG/1; MG/1
1 TABLET ORAL EVERY 6 HOURS PRN
Qty: 90 TABLET | Refills: 0 | Status: SHIPPED | OUTPATIENT
Start: 2018-07-02 | End: 2018-08-01 | Stop reason: SDUPTHER

## 2018-07-04 DIAGNOSIS — G89.4 CHRONIC PAIN SYNDROME: ICD-10-CM

## 2018-07-15 LAB
ALBUMIN SERPL-MCNC: 4.3 G/DL (ref 3.5–5.5)
ALBUMIN/GLOB SERPL: 1.7 {RATIO} (ref 1.2–2.2)
ALP SERPL-CCNC: 83 IU/L (ref 39–117)
ALT SERPL-CCNC: 33 IU/L (ref 0–44)
AST SERPL-CCNC: 19 IU/L (ref 0–40)
BILIRUB SERPL-MCNC: 0.5 MG/DL (ref 0–1.2)
BUN SERPL-MCNC: 14 MG/DL (ref 6–24)
BUN/CREAT SERPL: 18 (ref 9–20)
CALCIUM SERPL-MCNC: 9.7 MG/DL (ref 8.7–10.2)
CHLORIDE SERPL-SCNC: 95 MMOL/L (ref 96–106)
CHOLEST SERPL-MCNC: 132 MG/DL (ref 100–199)
CO2 SERPL-SCNC: 30 MMOL/L (ref 20–29)
CREAT SERPL-MCNC: 0.79 MG/DL (ref 0.76–1.27)
EST. AVERAGE GLUCOSE BLD GHB EST-MCNC: 108 MG/DL
GLOBULIN SER-MCNC: 2.6 G/DL (ref 1.5–4.5)
GLUCOSE SERPL-MCNC: 111 MG/DL (ref 65–99)
HBA1C MFR BLD: 5.4 % (ref 4.8–5.6)
HDLC SERPL-MCNC: 45 MG/DL
LDLC SERPL CALC-MCNC: 53 MG/DL (ref 0–99)
LDLC/HDLC SERPL: 1.2 RATIO (ref 0–3.6)
POTASSIUM SERPL-SCNC: 4.5 MMOL/L (ref 3.5–5.2)
PROT SERPL-MCNC: 6.9 G/DL (ref 6–8.5)
SL AMB EGFR AFRICAN AMERICAN: 118 ML/MIN/1.73
SL AMB EGFR NON AFRICAN AMERICAN: 102 ML/MIN/1.73
SL AMB VLDL CHOLESTEROL CALC: 34 MG/DL (ref 5–40)
SODIUM SERPL-SCNC: 139 MMOL/L (ref 134–144)
TRIGL SERPL-MCNC: 170 MG/DL (ref 0–149)

## 2018-07-23 ENCOUNTER — TELEPHONE (OUTPATIENT)
Dept: FAMILY MEDICINE CLINIC | Facility: CLINIC | Age: 53
End: 2018-07-23

## 2018-07-23 DIAGNOSIS — M10.9 GOUT, UNSPECIFIED CAUSE, UNSPECIFIED CHRONICITY, UNSPECIFIED SITE: ICD-10-CM

## 2018-07-23 DIAGNOSIS — E78.2 MIXED HYPERLIPIDEMIA: ICD-10-CM

## 2018-07-24 RX ORDER — ALLOPURINOL 100 MG/1
TABLET ORAL
Qty: 60 TABLET | Refills: 3 | Status: SHIPPED | OUTPATIENT
Start: 2018-07-24 | End: 2018-11-19 | Stop reason: SDUPTHER

## 2018-07-24 RX ORDER — ATORVASTATIN CALCIUM 20 MG/1
TABLET, FILM COATED ORAL
Qty: 90 TABLET | Refills: 0 | Status: SHIPPED | OUTPATIENT
Start: 2018-07-24 | End: 2018-11-05 | Stop reason: SDUPTHER

## 2018-08-01 ENCOUNTER — OFFICE VISIT (OUTPATIENT)
Dept: FAMILY MEDICINE CLINIC | Facility: CLINIC | Age: 53
End: 2018-08-01
Payer: COMMERCIAL

## 2018-08-01 VITALS
SYSTOLIC BLOOD PRESSURE: 128 MMHG | HEIGHT: 70 IN | WEIGHT: 304 LBS | BODY MASS INDEX: 43.52 KG/M2 | DIASTOLIC BLOOD PRESSURE: 88 MMHG

## 2018-08-01 DIAGNOSIS — E11.9 TYPE 2 DIABETES MELLITUS WITHOUT COMPLICATION, WITHOUT LONG-TERM CURRENT USE OF INSULIN (HCC): Chronic | ICD-10-CM

## 2018-08-01 DIAGNOSIS — I10 HYPERTENSION, UNSPECIFIED TYPE: Chronic | ICD-10-CM

## 2018-08-01 DIAGNOSIS — G89.4 CHRONIC PAIN SYNDROME: ICD-10-CM

## 2018-08-01 DIAGNOSIS — E78.01 FAMILIAL HYPERCHOLESTEROLEMIA: Chronic | ICD-10-CM

## 2018-08-01 DIAGNOSIS — J44.9 CHRONIC OBSTRUCTIVE PULMONARY DISEASE, UNSPECIFIED COPD TYPE (HCC): Primary | ICD-10-CM

## 2018-08-01 PROCEDURE — 3079F DIAST BP 80-89 MM HG: CPT | Performed by: FAMILY MEDICINE

## 2018-08-01 PROCEDURE — 3074F SYST BP LT 130 MM HG: CPT | Performed by: FAMILY MEDICINE

## 2018-08-01 PROCEDURE — 99214 OFFICE O/P EST MOD 30 MIN: CPT | Performed by: FAMILY MEDICINE

## 2018-08-01 PROCEDURE — 3008F BODY MASS INDEX DOCD: CPT | Performed by: FAMILY MEDICINE

## 2018-08-01 PROCEDURE — 1036F TOBACCO NON-USER: CPT | Performed by: FAMILY MEDICINE

## 2018-08-01 RX ORDER — HYDROCODONE BITARTRATE AND ACETAMINOPHEN 10; 325 MG/1; MG/1
1 TABLET ORAL EVERY 6 HOURS PRN
Qty: 90 TABLET | Refills: 0 | Status: SHIPPED | OUTPATIENT
Start: 2018-08-01 | End: 2018-08-27 | Stop reason: SDUPTHER

## 2018-08-01 RX ORDER — PREDNISONE 20 MG/1
TABLET ORAL
Qty: 15 TABLET | Refills: 0 | Status: SHIPPED | OUTPATIENT
Start: 2018-08-01 | End: 2019-01-17

## 2018-08-01 RX ORDER — ALBUTEROL SULFATE 90 UG/1
AEROSOL, METERED RESPIRATORY (INHALATION)
Qty: 18 G | Refills: 10 | Status: SHIPPED | OUTPATIENT
Start: 2018-08-01 | End: 2018-10-08 | Stop reason: CLARIF

## 2018-08-01 NOTE — PROGRESS NOTES
[address] [facility]        NAME: Tianna Ruby is a 46 y o  male  : 1965    MRN: [id number]  DATE: 2018  TIME: 10:02 AM    Assessment and Plan   Chronic obstructive pulmonary disease, unspecified COPD type ([facility] [address] ) [J44 9]  1  Chronic obstructive pulmonary disease, unspecified COPD type ([facility] [address] )  mometasone-formoterol (DULERA) 200-5 MCG/ACT inhaler   2  Chronic pain syndrome  HYDROcodone-acetaminophen (NORCO)  mg per tablet    albuterol (VENTOLIN HFA) 90 mcg/act inhaler   3  Familial hypercholesterolemia     4  Hypertension, unspecified type     5  Type 2 diabetes mellitus without complication, without long-term current use of insulin [facility])           Patient Instructions     Patient Instructions   Trial teodora handihaler          Chief Complaint     Chief Complaint   Patient presents with    Well Check     mm/labs         History of Present Illness       F/u DM/copd---wheezing worse        Review of Systems   Review of Systems   Constitutional: Negative for appetite change, chills, diaphoresis and fever  HENT: Negative for ear pain, rhinorrhea, sinus pressure and sore throat  Eyes: Negative for discharge, redness and itching  Respiratory: Negative for cough, shortness of breath and wheezing  Cardiovascular: Negative for chest pain and palpitations  Gastrointestinal: Negative for abdominal pain, diarrhea, nausea and vomiting           Current Medications       Current Outpatient Prescriptions:     albuterol (VENTOLIN HFA) 90 mcg/act inhaler, 2 p Q 4hrs prn, Disp: 18 g, Rfl: 10    allopurinol (ZYLOPRIM) 100 mg tablet, take 2 tablets by mouth once daily, Disp: 60 tablet, Rfl: 3    atorvastatin (LIPITOR) 20 mg tablet, take 1 tablet by mouth once daily, Disp: 90 tablet, Rfl: 0    diclofenac (VOLTAREN) 75 mg EC tablet, take 1 tablet by mouth twice a day, Disp: 60 tablet, Rfl: 1    Diclofenac Sodium (PENNSAID) 2 % SOLN, Place on the skin, Disp: , Rfl:    gabapentin (NEURONTIN) 100 mg capsule, Take by mouth, Disp: , Rfl:     hydrochlorothiazide (MICROZIDE) 12 5 mg capsule, Take 12 5 mg by mouth daily, Disp: , Rfl: 0    HYDROcodone-acetaminophen (NORCO)  mg per tablet, Take 1 tablet by mouth every 6 (six) hours as needed for moderate pain Max Daily Amount: 4 tablets, Disp: 90 tablet, Rfl: 0    hylan (SYNVISC) injection, Inject into the joint, Disp: , Rfl:     indomethacin (INDOCIN) 25 mg capsule, Take by mouth every 8 (eight) hours, Disp: , Rfl:     lisinopril (ZESTRIL) 40 mg tablet, take 1 tablet by mouth once daily, Disp: 90 tablet, Rfl: 0    metFORMIN (GLUCOPHAGE) 500 mg tablet, take 1 tablet by mouth twice a day as directed, Disp: 60 tablet, Rfl: 0    mometasone-formoterol (DULERA) 200-5 MCG/ACT inhaler, Inhale 2 puffs 2 (two) times a day, Disp: 3 Inhaler, Rfl: 0    Multiple Vitamin (MULTI-VITAMIN DAILY PO), Take 1 tablet by mouth daily, Disp: , Rfl:     potassium chloride (K-DUR,KLOR-CON) 10 mEq tablet, Take 1 tablet by mouth 2 (two) times a day, Disp: 60 tablet, Rfl: 0    predniSONE 20 mg tablet, TAKE 1 TABLET BID X 5 DAYS, THEN TAKE 1 TABLET QD FOR 5 DAYS, Disp: 15 tablet, Rfl: 0    rOPINIRole (REQUIP) 0 5 mg tablet, take 1 to 4 tablets by mouth at bedtime, Disp: 120 tablet, Rfl: 0    Current Allergies     Allergies as of 08/01/2018    (No Known Allergies)            The following portions of the patient's history were reviewed and updated as appropriate: allergies, current medications, past family history, past medical history, past social history, past surgical history and problem list      Past Medical History:   Diagnosis Date    Chronic pain     Diabetes mellitus ([facility] [address] )     Hyperlipidemia     Hypertension     Impaired fasting glucose     last assessed: 12/16/2015    Knee arthropathy     last assessed: 3/23/2016       Past Surgical History:   Procedure Laterality Date    KNEE ARTHROSCOPY Bilateral     KNEE SURGERY         Family History   Problem Relation Age of Onset    Coronary artery disease Father     Cancer Other         malignant neoplasm         Medications have been verified  Objective   /88   Ht 5' 10" (1 778 m)   Wt (!) 138 kg (304 lb)   BMI 43 62 kg/m²        Physical Exam     Physical Exam   Constitutional: He appears well-developed and well-nourished  No distress  HENT:   Right Ear: Tympanic membrane, external ear and ear canal normal  Tympanic membrane is not injected  Left Ear: Tympanic membrane, external ear and ear canal normal  Tympanic membrane is not injected  Nose: Nose normal    Mouth/Throat: Uvula is midline, oropharynx is clear and moist and mucous membranes are normal    Eyes: Conjunctivae are normal  Pupils are equal, round, and reactive to light  Neck: Normal range of motion  Neck supple  No thyromegaly present  Cardiovascular: Normal rate, regular rhythm and normal heart sounds  No murmur heard  Pulmonary/Chest: Effort normal  No respiratory distress  He has wheezes  Lymphadenopathy:     He has no cervical adenopathy  Skin: He is not diaphoretic

## 2018-08-17 DIAGNOSIS — J44.9 CHRONIC OBSTRUCTIVE PULMONARY DISEASE, UNSPECIFIED COPD TYPE (HCC): ICD-10-CM

## 2018-08-20 DIAGNOSIS — G89.4 CHRONIC PAIN SYNDROME: ICD-10-CM

## 2018-08-20 RX ORDER — DICLOFENAC SODIUM 75 MG/1
TABLET, DELAYED RELEASE ORAL
Qty: 60 TABLET | Refills: 1 | Status: SHIPPED | OUTPATIENT
Start: 2018-08-20 | End: 2018-10-22 | Stop reason: SDUPTHER

## 2018-08-23 DIAGNOSIS — J44.9 CHRONIC OBSTRUCTIVE PULMONARY DISEASE, UNSPECIFIED COPD TYPE (HCC): Primary | ICD-10-CM

## 2018-08-27 DIAGNOSIS — G89.4 CHRONIC PAIN SYNDROME: ICD-10-CM

## 2018-08-27 RX ORDER — HYDROCODONE BITARTRATE AND ACETAMINOPHEN 10; 325 MG/1; MG/1
1 TABLET ORAL EVERY 6 HOURS PRN
Qty: 90 TABLET | Refills: 0 | Status: SHIPPED | OUTPATIENT
Start: 2018-08-27 | End: 2018-10-01 | Stop reason: SDUPTHER

## 2018-09-08 DIAGNOSIS — E11.9 TYPE 2 DIABETES MELLITUS WITHOUT COMPLICATION, WITHOUT LONG-TERM CURRENT USE OF INSULIN (HCC): ICD-10-CM

## 2018-10-01 DIAGNOSIS — G89.4 CHRONIC PAIN SYNDROME: ICD-10-CM

## 2018-10-01 RX ORDER — HYDROCODONE BITARTRATE AND ACETAMINOPHEN 10; 325 MG/1; MG/1
1 TABLET ORAL EVERY 6 HOURS PRN
Qty: 90 TABLET | Refills: 0 | Status: SHIPPED | OUTPATIENT
Start: 2018-10-01 | End: 2018-11-05 | Stop reason: SDUPTHER

## 2018-10-08 DIAGNOSIS — J45.40 MODERATE PERSISTENT ASTHMA, UNSPECIFIED WHETHER COMPLICATED: Primary | ICD-10-CM

## 2018-10-08 RX ORDER — ALBUTEROL SULFATE 90 UG/1
2 AEROSOL, METERED RESPIRATORY (INHALATION) EVERY 6 HOURS PRN
Qty: 18 G | Refills: 1 | Status: SHIPPED | OUTPATIENT
Start: 2018-10-08 | End: 2018-11-28 | Stop reason: SDUPTHER

## 2018-10-22 DIAGNOSIS — G89.4 CHRONIC PAIN SYNDROME: ICD-10-CM

## 2018-10-22 RX ORDER — DICLOFENAC SODIUM 75 MG/1
TABLET, DELAYED RELEASE ORAL
Qty: 60 TABLET | Refills: 2 | Status: SHIPPED | OUTPATIENT
Start: 2018-10-22 | End: 2019-05-03 | Stop reason: ALTCHOICE

## 2018-11-05 DIAGNOSIS — G89.4 CHRONIC PAIN SYNDROME: ICD-10-CM

## 2018-11-05 DIAGNOSIS — J44.9 CHRONIC OBSTRUCTIVE PULMONARY DISEASE, UNSPECIFIED COPD TYPE (HCC): ICD-10-CM

## 2018-11-05 DIAGNOSIS — E78.2 MIXED HYPERLIPIDEMIA: ICD-10-CM

## 2018-11-06 ENCOUNTER — TELEPHONE (OUTPATIENT)
Dept: FAMILY MEDICINE CLINIC | Facility: CLINIC | Age: 53
End: 2018-11-06

## 2018-11-06 DIAGNOSIS — J45.40 MODERATE PERSISTENT ASTHMA, UNSPECIFIED WHETHER COMPLICATED: Primary | ICD-10-CM

## 2018-11-06 LAB
LEFT EYE DIABETIC RETINOPATHY: NORMAL
RIGHT EYE DIABETIC RETINOPATHY: NORMAL
SEVERITY (EYE EXAM): NORMAL

## 2018-11-06 PROCEDURE — 3072F LOW RISK FOR RETINOPATHY: CPT | Performed by: FAMILY MEDICINE

## 2018-11-06 RX ORDER — HYDROCODONE BITARTRATE AND ACETAMINOPHEN 10; 325 MG/1; MG/1
1 TABLET ORAL EVERY 6 HOURS PRN
Qty: 90 TABLET | Refills: 0 | Status: SHIPPED | OUTPATIENT
Start: 2018-11-06 | End: 2018-12-04 | Stop reason: SDUPTHER

## 2018-11-06 RX ORDER — ATORVASTATIN CALCIUM 20 MG/1
20 TABLET, FILM COATED ORAL DAILY
Qty: 90 TABLET | Refills: 0 | Status: SHIPPED | OUTPATIENT
Start: 2018-11-06 | End: 2019-02-01 | Stop reason: SDUPTHER

## 2018-11-19 DIAGNOSIS — M10.9 GOUT, UNSPECIFIED CAUSE, UNSPECIFIED CHRONICITY, UNSPECIFIED SITE: ICD-10-CM

## 2018-11-19 RX ORDER — ALLOPURINOL 100 MG/1
TABLET ORAL
Qty: 60 TABLET | Refills: 3 | Status: SHIPPED | OUTPATIENT
Start: 2018-11-19 | End: 2019-04-12 | Stop reason: SDUPTHER

## 2018-11-28 DIAGNOSIS — E11.9 TYPE 2 DIABETES MELLITUS WITHOUT COMPLICATION, WITHOUT LONG-TERM CURRENT USE OF INSULIN (HCC): ICD-10-CM

## 2018-11-28 DIAGNOSIS — J45.40 MODERATE PERSISTENT ASTHMA, UNSPECIFIED WHETHER COMPLICATED: ICD-10-CM

## 2018-11-28 RX ORDER — ALBUTEROL SULFATE 90 UG/1
2 AEROSOL, METERED RESPIRATORY (INHALATION) EVERY 6 HOURS PRN
Qty: 18 G | Refills: 1 | Status: SHIPPED | OUTPATIENT
Start: 2018-11-28 | End: 2019-01-14

## 2018-12-04 DIAGNOSIS — G89.4 CHRONIC PAIN SYNDROME: ICD-10-CM

## 2018-12-04 RX ORDER — HYDROCODONE BITARTRATE AND ACETAMINOPHEN 10; 325 MG/1; MG/1
1 TABLET ORAL EVERY 6 HOURS PRN
Qty: 90 TABLET | Refills: 0 | Status: SHIPPED | OUTPATIENT
Start: 2018-12-05 | End: 2019-01-07 | Stop reason: SDUPTHER

## 2018-12-17 ENCOUNTER — OFFICE VISIT (OUTPATIENT)
Dept: FAMILY MEDICINE CLINIC | Facility: CLINIC | Age: 53
End: 2018-12-17
Payer: COMMERCIAL

## 2018-12-17 VITALS
BODY MASS INDEX: 45.1 KG/M2 | HEART RATE: 105 BPM | WEIGHT: 315 LBS | OXYGEN SATURATION: 96 % | DIASTOLIC BLOOD PRESSURE: 84 MMHG | SYSTOLIC BLOOD PRESSURE: 134 MMHG | HEIGHT: 70 IN

## 2018-12-17 DIAGNOSIS — J40 BRONCHITIS: Primary | ICD-10-CM

## 2018-12-17 PROCEDURE — 99214 OFFICE O/P EST MOD 30 MIN: CPT | Performed by: FAMILY MEDICINE

## 2018-12-17 RX ORDER — PREDNISONE 20 MG/1
TABLET ORAL
Qty: 15 TABLET | Refills: 0 | Status: SHIPPED | OUTPATIENT
Start: 2018-12-17 | End: 2019-01-17

## 2018-12-17 RX ORDER — BUDESONIDE AND FORMOTEROL FUMARATE DIHYDRATE 160; 4.5 UG/1; UG/1
2 AEROSOL RESPIRATORY (INHALATION) 2 TIMES DAILY
Qty: 1 INHALER | Refills: 10 | Status: SHIPPED | OUTPATIENT
Start: 2018-12-17 | End: 2019-01-30 | Stop reason: SDUPTHER

## 2018-12-17 RX ORDER — AMOXICILLIN AND CLAVULANATE POTASSIUM 875; 125 MG/1; MG/1
1 TABLET, FILM COATED ORAL EVERY 12 HOURS SCHEDULED
Qty: 20 TABLET | Refills: 0 | Status: SHIPPED | OUTPATIENT
Start: 2018-12-17 | End: 2018-12-27

## 2018-12-17 RX ORDER — ALBUTEROL SULFATE 90 UG/1
2 AEROSOL, METERED RESPIRATORY (INHALATION) EVERY 6 HOURS PRN
Qty: 18 G | Refills: 0 | Status: SHIPPED | OUTPATIENT
Start: 2018-12-17 | End: 2019-01-14 | Stop reason: SDUPTHER

## 2018-12-17 RX ORDER — PROMETHAZINE HYDROCHLORIDE AND CODEINE PHOSPHATE 6.25; 1 MG/5ML; MG/5ML
5 SYRUP ORAL EVERY 4 HOURS PRN
Qty: 240 ML | Refills: 0 | Status: SHIPPED | OUTPATIENT
Start: 2018-12-17 | End: 2019-01-17 | Stop reason: SDUPTHER

## 2018-12-17 NOTE — PROGRESS NOTES
[facility]        NAME: Sangeetha Bobby is a 46 y o  male  : 1965    MRN: [id number]  DATE: 2018  TIME: 1:23 PM    Assessment and Plan   Bronchitis [J40]  1  Bronchitis  amoxicillin-clavulanate (AUGMENTIN) 875-125 mg per tablet    predniSONE 20 mg tablet    promethazine-codeine (PHENERGAN WITH CODEINE) 6 25-10 mg/5 mL syrup    budesonide-formoterol (SYMBICORT) 160-4 5 mcg/act inhaler    albuterol (VENTOLIN HFA) 90 mcg/act inhaler         Patient Instructions     Patient Instructions   See meds---PA Ventolin in --25 min          Chief Complaint     Chief Complaint   Patient presents with    Cough     x 1week    sinus/chest congestion     x 1week         History of Present Illness       Cough/wheeze---1 wk        Review of Systems   Review of Systems   Constitutional: Negative for fatigue, fever and unexpected weight change  HENT: Negative for congestion, sinus pressure and sore throat  Eyes: Negative for visual disturbance  Respiratory: Positive for cough, chest tightness, shortness of breath and wheezing  Cardiovascular: Negative for chest pain and palpitations  Gastrointestinal: Negative for abdominal pain, diarrhea, nausea and vomiting           Current Medications       Current Outpatient Prescriptions:     albuterol (PROAIR HFA) 90 mcg/act inhaler, Inhale 2 puffs every 6 (six) hours as needed for wheezing, Disp: 18 g, Rfl: 1    albuterol (VENTOLIN HFA) 90 mcg/act inhaler, Inhale 2 puffs every 6 (six) hours as needed for wheezing, Disp: 18 g, Rfl: 0    allopurinol (ZYLOPRIM) 100 mg tablet, take 2 tablets by mouth once daily, Disp: 60 tablet, Rfl: 3    amoxicillin-clavulanate (AUGMENTIN) 875-125 mg per tablet, Take 1 tablet by mouth every 12 (twelve) hours for 10 days, Disp: 20 tablet, Rfl: 0    atorvastatin (LIPITOR) 20 mg tablet, Take 1 tablet (20 mg total) by mouth daily, Disp: 90 tablet, Rfl: 0    budesonide-formoterol (SYMBICORT) 160-4 5 mcg/act inhaler, Inhale 2 puffs 2 (two) times a day Rinse mouth after use , Disp: 1 Inhaler, Rfl: 10    diclofenac (VOLTAREN) 75 mg EC tablet, take 1 tablet by mouth twice a day, Disp: 60 tablet, Rfl: 2    Diclofenac Sodium (PENNSAID) 2 % SOLN, Place on the skin, Disp: , Rfl:     fluticasone-salmeterol (ADVAIR DISKUS) 250-50 mcg/dose inhaler, Inhale 1 puff 2 (two) times a day Rinse mouth after use , Disp: 3 Inhaler, Rfl: 3    gabapentin (NEURONTIN) 100 mg capsule, Take by mouth, Disp: , Rfl:     hydrochlorothiazide (MICROZIDE) 12 5 mg capsule, Take 12 5 mg by mouth daily, Disp: , Rfl: 0    HYDROcodone-acetaminophen (NORCO)  mg per tablet, Take 1 tablet by mouth every 6 (six) hours as needed for moderate pain Max Daily Amount: 4 tablets, Disp: 90 tablet, Rfl: 0    hylan (SYNVISC) injection, Inject into the joint, Disp: , Rfl:     indomethacin (INDOCIN) 25 mg capsule, Take by mouth every 8 (eight) hours, Disp: , Rfl:     lisinopril (ZESTRIL) 40 mg tablet, take 1 tablet by mouth once daily, Disp: 90 tablet, Rfl: 0    metFORMIN (GLUCOPHAGE) 500 mg tablet, Take 1 tablet (500 mg total) by mouth 2 (two) times a day, Disp: 180 tablet, Rfl: 0    mometasone-formoterol (DULERA) 200-5 MCG/ACT inhaler, Inhale 2 puffs 2 (two) times a day, Disp: 3 Inhaler, Rfl: 0    Multiple Vitamin (MULTI-VITAMIN DAILY PO), Take 1 tablet by mouth daily, Disp: , Rfl:     potassium chloride (K-DUR,KLOR-CON) 10 mEq tablet, Take 1 tablet by mouth 2 (two) times a day, Disp: 60 tablet, Rfl: 0    predniSONE 20 mg tablet, TAKE 1 TABLET BID X 5 DAYS, THEN TAKE 1 TABLET QD FOR 5 DAYS, Disp: 15 tablet, Rfl: 0    predniSONE 20 mg tablet, TAKE 1 TABLET BID X 5 DAYS THEN TAKE 1 TABLET QD FOR 5 DAYS, Disp: 15 tablet, Rfl: 0    predniSONE 20 mg tablet, TAKE 1 TABLET BID X 5 DAYS THEN TAKE 1 TABLET QD FOR 5 DAYS, Disp: 15 tablet, Rfl: 0    promethazine-codeine (PHENERGAN WITH CODEINE) 6 25-10 mg/5 mL syrup, Take 5 mL by mouth every 4 (four) hours as needed for cough, Disp: 240 mL, Rfl: 0    rOPINIRole (REQUIP) 0 5 mg tablet, take 1 to 4 tablets by mouth at bedtime, Disp: 120 tablet, Rfl: 0    tiotropium (SPIRIVA RESPIMAT) 2 5 MCG/ACT AERS inhaler, Inhale 2 puffs daily, Disp: 1 Inhaler, Rfl: 10    Current Allergies     Allergies as of 12/17/2018    (No Known Allergies)            The following portions of the patient's history were reviewed and updated as appropriate: allergies, current medications, past family history, past medical history, past social history, past surgical history and problem list      Past Medical History:   Diagnosis Date    Chronic pain     Diabetes mellitus ([facility] [address] )     Hyperlipidemia     Hypertension     Impaired fasting glucose     last assessed: 12/16/2015    Knee arthropathy     last assessed: 3/23/2016       Past Surgical History:   Procedure Laterality Date    KNEE ARTHROSCOPY Bilateral     KNEE SURGERY         Family History   Problem Relation Age of Onset    Coronary artery disease Father     Cancer Other         malignant neoplasm         Medications have been verified  Objective   /84   Pulse 105   Ht 5' 10" (1 778 m)   Wt (!) 144 kg (318 lb)   SpO2 96%   BMI 45 63 kg/m²        Physical Exam     Physical Exam   Constitutional: He appears well-developed and well-nourished  No distress  HENT:   Right Ear: Tympanic membrane, external ear and ear canal normal  Tympanic membrane is not injected  Left Ear: Tympanic membrane, external ear and ear canal normal  Tympanic membrane is not injected  Nose: Nose normal    Mouth/Throat: Uvula is midline, oropharynx is clear and moist and mucous membranes are normal    Eyes: Pupils are equal, round, and reactive to light  Conjunctivae are normal    Neck: Normal range of motion  Neck supple  No thyromegaly present  Cardiovascular: Normal rate, regular rhythm and normal heart sounds  No murmur heard    Pulmonary/Chest: Effort normal  No respiratory distress  He has wheezes  He has rales  Lymphadenopathy:     He has no cervical adenopathy  Skin: He is not diaphoretic

## 2018-12-20 ENCOUNTER — OFFICE VISIT (OUTPATIENT)
Dept: OBGYN CLINIC | Facility: CLINIC | Age: 53
End: 2018-12-20
Payer: COMMERCIAL

## 2018-12-20 ENCOUNTER — APPOINTMENT (OUTPATIENT)
Dept: RADIOLOGY | Facility: CLINIC | Age: 53
End: 2018-12-20
Payer: COMMERCIAL

## 2018-12-20 VITALS
BODY MASS INDEX: 44.1 KG/M2 | HEART RATE: 98 BPM | DIASTOLIC BLOOD PRESSURE: 91 MMHG | SYSTOLIC BLOOD PRESSURE: 162 MMHG | WEIGHT: 315 LBS | HEIGHT: 71 IN

## 2018-12-20 DIAGNOSIS — M25.561 PAIN IN BOTH KNEES, UNSPECIFIED CHRONICITY: ICD-10-CM

## 2018-12-20 DIAGNOSIS — M25.562 PAIN IN BOTH KNEES, UNSPECIFIED CHRONICITY: ICD-10-CM

## 2018-12-20 DIAGNOSIS — M25.551 PAIN IN RIGHT HIP: Primary | ICD-10-CM

## 2018-12-20 DIAGNOSIS — M70.61 GREATER TROCHANTERIC BURSITIS OF RIGHT HIP: ICD-10-CM

## 2018-12-20 PROCEDURE — 73564 X-RAY EXAM KNEE 4 OR MORE: CPT

## 2018-12-20 PROCEDURE — 99243 OFF/OP CNSLTJ NEW/EST LOW 30: CPT | Performed by: ORTHOPAEDIC SURGERY

## 2018-12-20 NOTE — LETTER
December 20, 2018     Liudmila Dior MD  [facility]    Patient: Funmilayo Lopez   YOB: 1965   Date of Visit: 12/20/2018       Dear Dr Millie Archibald: Thank you for referring Karl Jaffe to me for evaluation  Below are my notes for this consultation  If you have questions, please do not hesitate to call me  I look forward to following your patient along with you  Sincerely,        Ashley Dixon DO        CC: No Recipients  Ashley Dixon DO  12/20/2018  5:39 PM  Sign at close encounter  [address] [facility] Patient Visit     Assesment:   46year old male with sever right hip osteoarthritis and greater trochanteric bursitis  Bilateral severe knee tricompartmental osteoarthritis  Plan:    I had a detailed discussion with Sameer Cutler regarding today's clinical and radiographic findings  Conservative treatment:    At today's visit, Garry's hip is bothering him most with significant point tenderness at the greater trochanter  I advised he should address this issue first and have an ultrasound guided cortisone injection into the bursa for pain relief and then begin physical therapy to enhance his strength and mechanics  Ice to knee for 20 minutes at least 1-2 times daily  Weight loss discussed  Keep the knee straight whenever possilble  Let pain guide gradual return activities  Imaging: All imaging from today was reviewed by myself and explained to the patient  Injection: An order was placed for consultation to pain management for an ultrasound guided corticosteroid injection into the right hip greater trochanteric bursa  Surgery:     No surgery is recommended at this point, continue with conservative treatment plan as noted  Follow up:    Return in about 8 weeks (around 2/14/2019) for Recheck          Chief Complaint   Patient presents with    Left Knee - Pain    Right Knee - Pain       History of Present Illness: 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 , referred to me by their primary care physician, seen in clinic for consultation of right hip pain and bilateral knee pain  Skye Mari reports that he has had several years of right hip pain and bilateral knee pain  He has had several injection in both knees including cortisone and viscosupplementation and a greater trochanter injection  His knee injections provided mild relief but has diminished recently  The hip injection was helpful initially but also wore off  Skye Mari has not tried physical therapy  At today's visit, the pain is located lateral in the hip  His knee pain is located medial and lateral bilaterally  The patient rates the pain as a 8/10  The pain has been present for a few years  The patient denies an injury  The mechanism of injury was uknown  The pain is characterized as sharp, stabbing, dull, achy  The pain is present at all times  Pain is improved by rest, ice, and vicoden  Pain is aggravated by weight bearing  Symptoms include clicking, catching, cracking and swelling  The patient has tried rest, ice, NSAIDS and injection            Hip/Knee Surgical History:  None    Past Medical, Social and Family History:  Past Medical History:   Diagnosis Date    Chronic pain     Diabetes mellitus ([facility] [address] )     Hyperlipidemia     Hypertension     Impaired fasting glucose     last assessed: 12/16/2015    Knee arthropathy     last assessed: 3/23/2016     Past Surgical History:   Procedure Laterality Date    KNEE ARTHROSCOPY Bilateral     KNEE SURGERY       No Known Allergies  Current Outpatient Prescriptions on File Prior to Visit   Medication Sig Dispense Refill    albuterol (PROAIR HFA) 90 mcg/act inhaler Inhale 2 puffs every 6 (six) hours as needed for wheezing 18 g 1    allopurinol (ZYLOPRIM) 100 mg tablet take 2 tablets by mouth once daily 60 tablet 3    amoxicillin-clavulanate (AUGMENTIN) 875-125 mg per tablet Take 1 tablet by mouth every 12 (twelve) hours for 10 days 20 tablet 0    atorvastatin (LIPITOR) 20 mg tablet Take 1 tablet (20 mg total) by mouth daily 90 tablet 0    budesonide-formoterol (SYMBICORT) 160-4 5 mcg/act inhaler Inhale 2 puffs 2 (two) times a day Rinse mouth after use  1 Inhaler 10    hydrochlorothiazide (MICROZIDE) 12 5 mg capsule Take 12 5 mg by mouth daily  0    HYDROcodone-acetaminophen (NORCO)  mg per tablet Take 1 tablet by mouth every 6 (six) hours as needed for moderate pain Max Daily Amount: 4 tablets 90 tablet 0    indomethacin (INDOCIN) 25 mg capsule Take by mouth every 8 (eight) hours      lisinopril (ZESTRIL) 40 mg tablet take 1 tablet by mouth once daily 90 tablet 0    metFORMIN (GLUCOPHAGE) 500 mg tablet Take 1 tablet (500 mg total) by mouth 2 (two) times a day 180 tablet 0    Multiple Vitamin (MULTI-VITAMIN DAILY PO) Take 1 tablet by mouth daily      predniSONE 20 mg tablet TAKE 1 TABLET BID X 5 DAYS THEN TAKE 1 TABLET QD FOR 5 DAYS 15 tablet 0    promethazine-codeine (PHENERGAN WITH CODEINE) 6 25-10 mg/5 mL syrup Take 5 mL by mouth every 4 (four) hours as needed for cough 240 mL 0    rOPINIRole (REQUIP) 0 5 mg tablet take 1 to 4 tablets by mouth at bedtime 120 tablet 0    albuterol (VENTOLIN HFA) 90 mcg/act inhaler Inhale 2 puffs every 6 (six) hours as needed for wheezing (Patient not taking: Reported on 12/20/2018 ) 18 g 0    diclofenac (VOLTAREN) 75 mg EC tablet take 1 tablet by mouth twice a day (Patient not taking: Reported on 12/20/2018) 60 tablet 2    Diclofenac Sodium (PENNSAID) 2 % SOLN Place on the skin      fluticasone-salmeterol (ADVAIR DISKUS) 250-50 mcg/dose inhaler Inhale 1 puff 2 (two) times a day Rinse mouth after use   (Patient not taking: Reported on 12/20/2018 ) 3 Inhaler 3    gabapentin (NEURONTIN) 100 mg capsule Take by mouth      hylan (SYNVISC) injection Inject into the joint      mometasone-formoterol (DULERA) 200-5 MCG/ACT inhaler Inhale 2 puffs 2 (two) times a day (Patient not taking: Reported on 12/20/2018 ) 3 Inhaler 0    potassium chloride (K-DUR,KLOR-CON) 10 mEq tablet Take 1 tablet by mouth 2 (two) times a day (Patient not taking: Reported on 12/20/2018 ) 60 tablet 0    predniSONE 20 mg tablet TAKE 1 TABLET BID X 5 DAYS,  THEN TAKE 1 TABLET QD FOR 5 DAYS (Patient not taking: Reported on 12/20/2018 ) 15 tablet 0    predniSONE 20 mg tablet TAKE 1 TABLET BID X 5 DAYS THEN TAKE 1 TABLET QD FOR 5 DAYS (Patient not taking: Reported on 12/20/2018 ) 15 tablet 0    tiotropium (SPIRIVA RESPIMAT) 2 5 MCG/ACT AERS inhaler Inhale 2 puffs daily (Patient not taking: Reported on 12/20/2018 ) 1 Inhaler 10     No current facility-administered medications on file prior to visit  Social History     Social History    Marital status:      Spouse name: N/A    Number of children: N/A    Years of education: N/A     Occupational History    Not on file  Social History Main Topics    Smoking status: Never Smoker    Smokeless tobacco: Never Used    Alcohol use Yes      Comment: 2 light beers daily    Drug use: No    Sexual activity: Not on file     Other Topics Concern    Not on file     Social History Narrative    No narrative on file         I have reviewed the past medical, surgical, social and family history, medications and allergies as documented in the EMR  Review of systems: ROS is negative other than that noted in the HPI  Constitutional: Negative for fatigue and fever  HENT: Negative for sore throat  Respiratory: Negative for shortness of breath  Cardiovascular: Negative for chest pain  Gastrointestinal: Negative for abdominal pain  Endocrine: Negative for cold intolerance and heat intolerance  Genitourinary: Negative for flank pain  Musculoskeletal: Negative for back pain  Skin: Negative for rash  Allergic/Immunologic: Negative for immunocompromised state     Neurological: Negative for dizziness  Psychiatric/Behavioral: Negative for agitation  Physical Exam:    Blood pressure 162/91, pulse 98, height 5' 10 5" (1 791 m), weight (!) 154 kg (340 lb)  General/Constitutional: NAD, well developed, well nourished  HENT: Normocephalic, atraumatic  CV: Intact distal pulses, regular rate  Resp: No respiratory distress or labored breathing  Lymphatic: No lymphadenopathy palpated  Neuro: Alert and Oriented x 3, no focal deficits  Psych: Normal mood, normal affect, normal judgement, normal behavior  Skin: Warm, dry, no rashes, no erythema      Right Hip Exam  Alignment / Posture:  Normal lumbar alignment  Normal resting hip posture  15 degrees genu varus  Mild ankle varus  Foot plantigrade  Normal plantar arch  Inspection:  No swelling  No erythema  No ecchymosis  No muscle atrophy  No deformity  Palpation:  Significant greater trochanter tenderness  ROM:  Hip Flexion 100  Hip Abduction 50  Hip ER 45  Hip IR 15  Strength:  5/5 hip flexors and abductors  5/5 quadriceps and hamstrings  Stability:  (-) Logroll  (-) Anterior apprehension test  (-) Posterior apprehension test  (-) Prone ER test   Tests:  (-) Nunu  (-) RUSSELL  (-) NIKOLAS  (-) Kayla  Neurovascular:  Sensation intact in DP/SP/Srivastava/Sa/T nerve distributions  Sensation intact in all digital nerve distributions  Sensation intact L1-S1 BLE  Sensation inatact L1-S1 LLE  Sensation intact L1-S1 RLE  2+ DP & PT pulses  Palpable DP & PT pulses  Gait:  Normal  Smooth  Heel-to-toe        Knee Exam (focused):                RIGHT LEFT   ROM:   0-130 0-130   Palpation: Effusion minimal minimal     MJL tenderness Positive Positive     LJL tenderness Negative Negative   Instability: Varus stable stable     Valgus stable stable   Special Tests: Lachman Negative Negative     Posterior drawer Negative Negative     Anterior drawer Negative Negative     Pivot shift not tested not tested     Dial not tested not tested   Patella: Palpation no tenderness no tenderness     Mobility 1/4 1/4     Apprehension Negative Negative   Other: Single leg 1/4 squat not tested not tested      LE NV Exam: +2 DP/PT pulses bilaterally  Sensation intact to light touch L2-S1 bilaterally     Bilateral hip ROM demonstrates no pain actively or passively    No calf tenderness to palpation bilaterally    Knee Imaging    X-rays of the bilateral knee were reviewed, which demonstrate severe medial and patellofemoral compartment osteoarthritis and moderate lateral compartment osteoarthritis  X-rays of the right hip were reviewed which demonstrate severe right femuroacetabular osteoarthritis and moderate left femuroacetabular osteoarthritis  I do not currently have a radiology reading from [address] [place] [address], but will check the result once the reading is performed        Scribe Attestation    I,:    am acting as a scribe while in the presence of the attending physician :        I,:    personally performed the services described in this documentation    as scribed in my presence :

## 2018-12-20 NOTE — PROGRESS NOTES
Ortho Sports Medicine Hip/Knee New Patient Visit     Assesment:   46year old male with sever right hip osteoarthritis and greater trochanteric bursitis  Bilateral severe knee tricompartmental osteoarthritis  Plan:    I had a detailed discussion with Zully Ahumada regarding today's clinical and radiographic findings  Conservative treatment:    At today's visit, Garry's hip is bothering him most with significant point tenderness at the greater trochanter  I advised he should address this issue first and have an ultrasound guided cortisone injection into the bursa for pain relief and then begin physical therapy to enhance his strength and mechanics  Ice to knee for 20 minutes at least 1-2 times daily  Weight loss discussed  Keep the knee straight whenever possilble  Let pain guide gradual return activities  Imaging: All imaging from today was reviewed by myself and explained to the patient  Injection: An order was placed for consultation to pain management for an ultrasound guided corticosteroid injection into the right hip greater trochanteric bursa  Surgery:     No surgery is recommended at this point, continue with conservative treatment plan as noted  Follow up:    Return in about 8 weeks (around 2/14/2019) for Recheck  Chief Complaint   Patient presents with    Left Knee - Pain    Right Knee - Pain       History of Present Illness: 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 , referred to me by their primary care physician, seen in clinic for consultation of right hip pain and bilateral knee pain  Zully Ahumada reports that he has had several years of right hip pain and bilateral knee pain  He has had several injection in both knees including cortisone and viscosupplementation and a greater trochanter injection  His knee injections provided mild relief but has diminished recently  The hip injection was helpful initially but also wore off   Zully Ahumada has not tried physical therapy  At today's visit, the pain is located lateral in the hip  His knee pain is located medial and lateral bilaterally  The patient rates the pain as a 8/10  The pain has been present for a few years  The patient denies an injury  The mechanism of injury was uknown  The pain is characterized as sharp, stabbing, dull, achy  The pain is present at all times  Pain is improved by rest, ice, and vicoden  Pain is aggravated by weight bearing  Symptoms include clicking, catching, cracking and swelling  The patient has tried rest, ice, NSAIDS and injection  Hip/Knee Surgical History:  None    Past Medical, Social and Family History:  Past Medical History:   Diagnosis Date    Chronic pain     Diabetes mellitus ([facility] [address] )     Hyperlipidemia     Hypertension     Impaired fasting glucose     last assessed: 12/16/2015    Knee arthropathy     last assessed: 3/23/2016     Past Surgical History:   Procedure Laterality Date    KNEE ARTHROSCOPY Bilateral     KNEE SURGERY       No Known Allergies  Current Outpatient Prescriptions on File Prior to Visit   Medication Sig Dispense Refill    albuterol (PROAIR HFA) 90 mcg/act inhaler Inhale 2 puffs every 6 (six) hours as needed for wheezing 18 g 1    allopurinol (ZYLOPRIM) 100 mg tablet take 2 tablets by mouth once daily 60 tablet 3    amoxicillin-clavulanate (AUGMENTIN) 875-125 mg per tablet Take 1 tablet by mouth every 12 (twelve) hours for 10 days 20 tablet 0    atorvastatin (LIPITOR) 20 mg tablet Take 1 tablet (20 mg total) by mouth daily 90 tablet 0    budesonide-formoterol (SYMBICORT) 160-4 5 mcg/act inhaler Inhale 2 puffs 2 (two) times a day Rinse mouth after use   1 Inhaler 10    hydrochlorothiazide (MICROZIDE) 12 5 mg capsule Take 12 5 mg by mouth daily  0    HYDROcodone-acetaminophen (NORCO)  mg per tablet Take 1 tablet by mouth every 6 (six) hours as needed for moderate pain Max Daily Amount: 4 tablets 90 tablet 0    indomethacin (INDOCIN) 25 mg capsule Take by mouth every 8 (eight) hours      lisinopril (ZESTRIL) 40 mg tablet take 1 tablet by mouth once daily 90 tablet 0    metFORMIN (GLUCOPHAGE) 500 mg tablet Take 1 tablet (500 mg total) by mouth 2 (two) times a day 180 tablet 0    Multiple Vitamin (MULTI-VITAMIN DAILY PO) Take 1 tablet by mouth daily      predniSONE 20 mg tablet TAKE 1 TABLET BID X 5 DAYS THEN TAKE 1 TABLET QD FOR 5 DAYS 15 tablet 0    promethazine-codeine (PHENERGAN WITH CODEINE) 6 25-10 mg/5 mL syrup Take 5 mL by mouth every 4 (four) hours as needed for cough 240 mL 0    rOPINIRole (REQUIP) 0 5 mg tablet take 1 to 4 tablets by mouth at bedtime 120 tablet 0    albuterol (VENTOLIN HFA) 90 mcg/act inhaler Inhale 2 puffs every 6 (six) hours as needed for wheezing (Patient not taking: Reported on 12/20/2018 ) 18 g 0    diclofenac (VOLTAREN) 75 mg EC tablet take 1 tablet by mouth twice a day (Patient not taking: Reported on 12/20/2018) 60 tablet 2    Diclofenac Sodium (PENNSAID) 2 % SOLN Place on the skin      fluticasone-salmeterol (ADVAIR DISKUS) 250-50 mcg/dose inhaler Inhale 1 puff 2 (two) times a day Rinse mouth after use   (Patient not taking: Reported on 12/20/2018 ) 3 Inhaler 3    gabapentin (NEURONTIN) 100 mg capsule Take by mouth      hylan (SYNVISC) injection Inject into the joint      mometasone-formoterol (DULERA) 200-5 MCG/ACT inhaler Inhale 2 puffs 2 (two) times a day (Patient not taking: Reported on 12/20/2018 ) 3 Inhaler 0    potassium chloride (K-DUR,KLOR-CON) 10 mEq tablet Take 1 tablet by mouth 2 (two) times a day (Patient not taking: Reported on 12/20/2018 ) 60 tablet 0    predniSONE 20 mg tablet TAKE 1 TABLET BID X 5 DAYS,  THEN TAKE 1 TABLET QD FOR 5 DAYS (Patient not taking: Reported on 12/20/2018 ) 15 tablet 0    predniSONE 20 mg tablet TAKE 1 TABLET BID X 5 DAYS THEN TAKE 1 TABLET QD FOR 5 DAYS (Patient not taking: Reported on 12/20/2018 ) 15 tablet 0    tiotropium (SPIRIVA RESPIMAT) 2 5 MCG/ACT AERS inhaler Inhale 2 puffs daily (Patient not taking: Reported on 12/20/2018 ) 1 Inhaler 10     No current facility-administered medications on file prior to visit  Social History     Social History    Marital status:      Spouse name: N/A    Number of children: N/A    Years of education: N/A     Occupational History    Not on file  Social History Main Topics    Smoking status: Never Smoker    Smokeless tobacco: Never Used    Alcohol use Yes      Comment: 2 light beers daily    Drug use: No    Sexual activity: Not on file     Other Topics Concern    Not on file     Social History Narrative    No narrative on file         I have reviewed the past medical, surgical, social and family history, medications and allergies as documented in the EMR  Review of systems: ROS is negative other than that noted in the HPI  Constitutional: Negative for fatigue and fever  HENT: Negative for sore throat  Respiratory: Negative for shortness of breath  Cardiovascular: Negative for chest pain  Gastrointestinal: Negative for abdominal pain  Endocrine: Negative for cold intolerance and heat intolerance  Genitourinary: Negative for flank pain  Musculoskeletal: Negative for back pain  Skin: Negative for rash  Allergic/Immunologic: Negative for immunocompromised state  Neurological: Negative for dizziness  Psychiatric/Behavioral: Negative for agitation  Physical Exam:    Blood pressure 162/91, pulse 98, height 5' 10 5" (1 791 m), weight (!) 154 kg (340 lb)      General/Constitutional: NAD, well developed, well nourished  HENT: Normocephalic, atraumatic  CV: Intact distal pulses, regular rate  Resp: No respiratory distress or labored breathing  Lymphatic: No lymphadenopathy palpated  Neuro: Alert and Oriented x 3, no focal deficits  Psych: Normal mood, normal affect, normal judgement, normal behavior  Skin: Warm, dry, no rashes, no erythema      Right Hip Exam  Alignment / Posture:  Normal lumbar alignment  Normal resting hip posture  15 degrees genu varus  Mild ankle varus  Foot plantigrade  Normal plantar arch  Inspection:  No swelling  No erythema  No ecchymosis  No muscle atrophy  No deformity  Palpation:  Significant greater trochanter tenderness  ROM:  Hip Flexion 100  Hip Abduction 50  Hip ER 45  Hip IR 15  Strength:  5/5 hip flexors and abductors  5/5 quadriceps and hamstrings  Stability:  (-) Logroll  (-) Anterior apprehension test  (-) Posterior apprehension test  (-) Prone ER test   Tests:  (-) Nunu  (-) RUSSELL  (-) NIKOLAS  (-) Kayla  Neurovascular:  Sensation intact in DP/SP/Srivastava/Sa/T nerve distributions  Sensation intact in all digital nerve distributions  Sensation intact L1-S1 BLE  Sensation inatact L1-S1 LLE  Sensation intact L1-S1 RLE  2+ DP & PT pulses  Palpable DP & PT pulses  Gait:  Normal  Smooth  Heel-to-toe  Knee Exam (focused):                RIGHT LEFT   ROM:   0-130 0-130   Palpation: Effusion minimal minimal     MJL tenderness Positive Positive     LJL tenderness Negative Negative   Instability: Varus stable stable     Valgus stable stable   Special Tests: Lachman Negative Negative     Posterior drawer Negative Negative     Anterior drawer Negative Negative     Pivot shift not tested not tested     Dial not tested not tested   Patella: Palpation no tenderness no tenderness     Mobility 1/4 1/4     Apprehension Negative Negative   Other: Single leg 1/4 squat not tested not tested      LE NV Exam: +2 DP/PT pulses bilaterally  Sensation intact to light touch L2-S1 bilaterally     Bilateral hip ROM demonstrates no pain actively or passively    No calf tenderness to palpation bilaterally    Knee Imaging    X-rays of the bilateral knee were reviewed, which demonstrate severe medial and patellofemoral compartment osteoarthritis and moderate lateral compartment osteoarthritis       X-rays of the right hip were reviewed which demonstrate severe right femuroacetabular osteoarthritis and moderate left femuroacetabular osteoarthritis  I do not currently have a radiology reading from [facility], but will check the result once the reading is performed        Scribe Attestation    I,:    am acting as a scribe while in the presence of the attending physician :        I,:    personally performed the services described in this documentation    as scribed in my presence :

## 2019-01-07 ENCOUNTER — TELEPHONE (OUTPATIENT)
Dept: RADIOLOGY | Facility: CLINIC | Age: 54
End: 2019-01-07

## 2019-01-07 DIAGNOSIS — G89.4 CHRONIC PAIN SYNDROME: ICD-10-CM

## 2019-01-07 RX ORDER — HYDROCODONE BITARTRATE AND ACETAMINOPHEN 10; 325 MG/1; MG/1
1 TABLET ORAL EVERY 6 HOURS PRN
Qty: 90 TABLET | Refills: 0 | Status: SHIPPED | OUTPATIENT
Start: 2019-01-07 | End: 2019-03-22 | Stop reason: ALTCHOICE

## 2019-01-07 NOTE — TELEPHONE ENCOUNTER
Approve        Pt aware of new policy--seen Pain MD next Tuesday 1/8/19, for injection will speak to them at that time about medications

## 2019-01-08 ENCOUNTER — TELEPHONE (OUTPATIENT)
Dept: PAIN MEDICINE | Facility: CLINIC | Age: 54
End: 2019-01-08

## 2019-01-08 NOTE — TELEPHONE ENCOUNTER
Please review the medical records for this patient  Can you see this patient or does he need to be referred to one of the other [facility] doctors that uses ultrasound  Patient is scheduled with you and I need to know if I have to reschedule him

## 2019-01-08 NOTE — TELEPHONE ENCOUNTER
Please see provider response, you can cx the consult and then send the task back to me to schedule pt for a direct hip inj

## 2019-01-10 NOTE — TELEPHONE ENCOUNTER
Proc scheduled for 1/14/19, pt aware need for , npo 1 hr prior procedure, if become ill/antbx call to r/s proc, wear pants without metal  Pt verbalized understanding

## 2019-01-12 DIAGNOSIS — E03.9 ACQUIRED HYPOTHYROIDISM: ICD-10-CM

## 2019-01-12 PROCEDURE — 4010F ACE/ARB THERAPY RXD/TAKEN: CPT | Performed by: FAMILY MEDICINE

## 2019-01-12 RX ORDER — LISINOPRIL 40 MG/1
TABLET ORAL
Qty: 90 TABLET | Refills: 0 | Status: SHIPPED | OUTPATIENT
Start: 2019-01-12 | End: 2019-01-17

## 2019-01-14 ENCOUNTER — HOSPITAL ENCOUNTER (OUTPATIENT)
Dept: RADIOLOGY | Facility: CLINIC | Age: 54
Discharge: HOME | End: 2019-01-14
Attending: ANESTHESIOLOGY
Payer: COMMERCIAL

## 2019-01-14 VITALS
TEMPERATURE: 97.8 F | DIASTOLIC BLOOD PRESSURE: 105 MMHG | OXYGEN SATURATION: 94 % | HEART RATE: 89 BPM | RESPIRATION RATE: 20 BRPM | SYSTOLIC BLOOD PRESSURE: 168 MMHG

## 2019-01-14 DIAGNOSIS — M70.61 GREATER TROCHANTERIC BURSITIS OF RIGHT HIP: ICD-10-CM

## 2019-01-14 DIAGNOSIS — J40 BRONCHITIS: ICD-10-CM

## 2019-01-14 PROCEDURE — 77002 NEEDLE LOCALIZATION BY XRAY: CPT

## 2019-01-14 PROCEDURE — 77002 NEEDLE LOCALIZATION BY XRAY: CPT | Performed by: ANESTHESIOLOGY

## 2019-01-14 PROCEDURE — 20610 DRAIN/INJ JOINT/BURSA W/O US: CPT | Performed by: ANESTHESIOLOGY

## 2019-01-14 RX ORDER — METHYLPREDNISOLONE ACETATE 80 MG/ML
80 INJECTION, SUSPENSION INTRA-ARTICULAR; INTRALESIONAL; INTRAMUSCULAR; PARENTERAL; SOFT TISSUE ONCE
Status: COMPLETED | OUTPATIENT
Start: 2019-01-14 | End: 2019-01-14

## 2019-01-14 RX ORDER — LIDOCAINE HYDROCHLORIDE 10 MG/ML
5 INJECTION, SOLUTION EPIDURAL; INFILTRATION; INTRACAUDAL; PERINEURAL ONCE
Status: COMPLETED | OUTPATIENT
Start: 2019-01-14 | End: 2019-01-14

## 2019-01-14 RX ORDER — ALBUTEROL SULFATE 90 UG/1
2 AEROSOL, METERED RESPIRATORY (INHALATION) EVERY 6 HOURS PRN
Qty: 18 G | Refills: 3 | Status: SHIPPED | OUTPATIENT
Start: 2019-01-14 | End: 2019-03-07 | Stop reason: SDUPTHER

## 2019-01-14 RX ADMIN — LIDOCAINE HYDROCHLORIDE 3 ML: 10 INJECTION, SOLUTION EPIDURAL; INFILTRATION; INTRACAUDAL; PERINEURAL at 13:12

## 2019-01-14 RX ADMIN — IOHEXOL 1 ML: 300 INJECTION, SOLUTION INTRAVENOUS at 13:15

## 2019-01-14 RX ADMIN — METHYLPREDNISOLONE ACETATE 80 MG: 80 INJECTION, SUSPENSION INTRA-ARTICULAR; INTRALESIONAL; INTRAMUSCULAR; SOFT TISSUE at 13:16

## 2019-01-14 RX ADMIN — LIDOCAINE HYDROCHLORIDE 2 ML: 20 INJECTION, SOLUTION EPIDURAL; INFILTRATION; INTRACAUDAL at 13:16

## 2019-01-14 NOTE — H&P
History of Present Illness: The patient is a 53 y o  male who presents with complaints of right hip pain  Patient Active Problem List   Diagnosis    Hyponatremia    Diabetes mellitus, type 2 ([facility] )    Hyperglycemia due to type 2 diabetes mellitus ([facility] )    Weakness generalized    Hypokalemia    Hypertension    Hyperlipidemia    Chronic pain syndrome       Past Medical History:   Diagnosis Date    Chronic pain     Diabetes mellitus ([facility] )     Hyperlipidemia     Hypertension     Impaired fasting glucose     last assessed: 12/16/2015    Knee arthropathy     last assessed: 3/23/2016       Past Surgical History:   Procedure Laterality Date    KNEE ARTHROSCOPY Bilateral     KNEE SURGERY           Current Outpatient Prescriptions:     albuterol (VENTOLIN HFA) 90 mcg/act inhaler, Inhale 2 puffs every 6 (six) hours as needed for wheezing, Disp: 18 g, Rfl: 3    allopurinol (ZYLOPRIM) 100 mg tablet, take 2 tablets by mouth once daily, Disp: 60 tablet, Rfl: 3    atorvastatin (LIPITOR) 20 mg tablet, Take 1 tablet (20 mg total) by mouth daily, Disp: 90 tablet, Rfl: 0    budesonide-formoterol (SYMBICORT) 160-4 5 mcg/act inhaler, Inhale 2 puffs 2 (two) times a day Rinse mouth after use , Disp: 1 Inhaler, Rfl: 10    diclofenac (VOLTAREN) 75 mg EC tablet, take 1 tablet by mouth twice a day (Patient not taking: Reported on 12/20/2018), Disp: 60 tablet, Rfl: 2    Diclofenac Sodium (PENNSAID) 2 % SOLN, Place on the skin, Disp: , Rfl:     fluticasone-salmeterol (ADVAIR DISKUS) 250-50 mcg/dose inhaler, Inhale 1 puff 2 (two) times a day Rinse mouth after use   (Patient not taking: Reported on 12/20/2018 ), Disp: 3 Inhaler, Rfl: 3    gabapentin (NEURONTIN) 100 mg capsule, Take by mouth, Disp: , Rfl:     hydrochlorothiazide (MICROZIDE) 12 5 mg capsule, Take 12 5 mg by mouth daily, Disp: , Rfl: 0    HYDROcodone-acetaminophen (NORCO)  mg per tablet, Take 1 tablet by mouth every 6 (six) hours as needed for moderate pain Max Daily Amount: 4 tablets, Disp: 90 tablet, Rfl: 0    hylan (SYNVISC) injection, Inject into the joint, Disp: , Rfl:     indomethacin (INDOCIN) 25 mg capsule, Take by mouth every 8 (eight) hours, Disp: , Rfl:     lisinopril (ZESTRIL) 40 mg tablet, take 1 tablet by mouth once daily, Disp: 90 tablet, Rfl: 0    lisinopril (ZESTRIL) 40 mg tablet, take 1 tablet by mouth daily, Disp: 90 tablet, Rfl: 0    metFORMIN (GLUCOPHAGE) 500 mg tablet, Take 1 tablet (500 mg total) by mouth 2 (two) times a day, Disp: 180 tablet, Rfl: 0    mometasone-formoterol (DULERA) 200-5 MCG/ACT inhaler, Inhale 2 puffs 2 (two) times a day (Patient not taking: Reported on 12/20/2018 ), Disp: 3 Inhaler, Rfl: 0    Multiple Vitamin (MULTI-VITAMIN DAILY PO), Take 1 tablet by mouth daily, Disp: , Rfl:     potassium chloride (K-DUR,KLOR-CON) 10 mEq tablet, Take 1 tablet by mouth 2 (two) times a day (Patient not taking: Reported on 12/20/2018 ), Disp: 60 tablet, Rfl: 0    predniSONE 20 mg tablet, TAKE 1 TABLET BID X 5 DAYS, THEN TAKE 1 TABLET QD FOR 5 DAYS (Patient not taking: Reported on 12/20/2018 ), Disp: 15 tablet, Rfl: 0    predniSONE 20 mg tablet, TAKE 1 TABLET BID X 5 DAYS THEN TAKE 1 TABLET QD FOR 5 DAYS (Patient not taking: Reported on 12/20/2018 ), Disp: 15 tablet, Rfl: 0    predniSONE 20 mg tablet, TAKE 1 TABLET BID X 5 DAYS THEN TAKE 1 TABLET QD FOR 5 DAYS, Disp: 15 tablet, Rfl: 0    promethazine-codeine (PHENERGAN WITH CODEINE) 6 25-10 mg/5 mL syrup, Take 5 mL by mouth every 4 (four) hours as needed for cough, Disp: 240 mL, Rfl: 0    rOPINIRole (REQUIP) 0 5 mg tablet, take 1 to 4 tablets by mouth at bedtime, Disp: 120 tablet, Rfl: 0    tiotropium (SPIRIVA RESPIMAT) 2 5 MCG/ACT AERS inhaler, Inhale 2 puffs daily (Patient not taking: Reported on 12/20/2018 ), Disp: 1 Inhaler, Rfl: 10    Current Facility-Administered Medications:     iohexol (OMNIPAQUE) 300 mg/mL injection 50 mL, 50 mL, Intra-articular, Once, Marlo Rueda DO    lidocaine (PF) (XYLOCAINE-MPF) 1 % injection 5 mL, 5 mL, Other, Once, Marlo Rueda DO    lidocaine (PF) (XYLOCAINE-MPF) 2 % injection 5 mL, 5 mL, Intra-articular, Once, Marlo Rueda DO    methylPREDNISolone acetate (DEPO-MEDROL) injection 80 mg, 80 mg, Intra-articular, Once, Marlo Rueda,     No Known Allergies    Physical Exam:   Vitals:    01/14/19 1252   BP: (!) 169/110   Pulse: 90   Resp: 20   Temp:    SpO2: 90%     General: Awake, Alert, Oriented x 3, Mood and affect appropriate  Respiratory: Respirations even and unlabored  Cardiovascular: Peripheral pulses intact; no edema  Musculoskeletal Exam:  Pain to palpation right greater trochanteric bursa    ASA Score: III    Patient/Chart Verification  Patient ID Verified: Verbal  ID Band Applied: No  Consents Confirmed: Procedural  H&P( within 30 days) Verified: To be obtained in the Pre-Procedure area  Interval H&P(within 24 hr) Complete (required for Outpatients and Surgery Admit only): To be obtained in the Pre-Procedure area  Allergies Reviewed: Yes  Anticoag/NSAID held?: NA  Currently on antibiotics?: No  Pre-op Lab/Test Results Available: N/A    Assessment:   1   Greater trochanteric bursitis of right hip        Plan: RT [facility] Direct ortho nancy

## 2019-01-14 NOTE — DISCHARGE INSTRUCTIONS
1  Do not apply heat to any area that is numb  If you have discomfort or soreness at the injection site, you may apply ice today, 20 minutes on and 20 minutes off  Tomorrow you may use ice or warm, moist heat  Do not apply ice or heat directly to the skin  2  If you experience severe shortness of breath, go to the Emergency Room  3  You may have numbness for several hours from the local anesthetic  Please use caution and common sense, especially with weight-bearing activities  4  You may have an increase or change in the discomfort for 36-48 hours after your treatment  Apply ice and continue with any pain medicine you have been prescribed  5  Do not do anything strenuous today  You may shower, but no tub baths or hot tubs today  You may resume your normal activities tomorrow, but do not overdo it  Resume normal activities slowly when you are feeling better  6  If you experience redness, drainage or swelling at the injection site, or if you develop a fever above 100 degrees, please call [facility] at [phone] or go to the Emergency Room  7  Continue to take all routine medicines prescribed by your primary care physician unless otherwise instructed by our staff  Most blood thinners should be started again according to your regularly scheduled dosing  If you have any questions, please give our office a call  If you have a problem specifically related to your procedure, please call our office at [phone]  Problems not related to your procedure should be directed to your primary care physician

## 2019-01-14 NOTE — NURSING NOTE
Notified Dr Lien Marin and s/w Rock Williamson  Reviewed preoperative VS with her  She was informed that the patient was seen for an earlier appointment but forgot to take his BP meds that am  Encouraged the patient to go home and take his medications and patient rescheduled for after lunch per SL  VS taken again upon admission, patient denies symptoms at present  Per Dr Susan osborne to proceed with the [facility]  Per Mathieu Campbell, patient is to F/u c/ their office to review  Patient verbalized understanding

## 2019-01-17 ENCOUNTER — OFFICE VISIT (OUTPATIENT)
Dept: FAMILY MEDICINE CLINIC | Facility: CLINIC | Age: 54
End: 2019-01-17
Payer: COMMERCIAL

## 2019-01-17 VITALS
SYSTOLIC BLOOD PRESSURE: 138 MMHG | OXYGEN SATURATION: 98 % | HEIGHT: 71 IN | DIASTOLIC BLOOD PRESSURE: 88 MMHG | WEIGHT: 315 LBS | BODY MASS INDEX: 44.1 KG/M2 | HEART RATE: 88 BPM

## 2019-01-17 DIAGNOSIS — I10 HYPERTENSION, UNSPECIFIED TYPE: Primary | ICD-10-CM

## 2019-01-17 DIAGNOSIS — J40 BRONCHITIS: ICD-10-CM

## 2019-01-17 DIAGNOSIS — Z00.00 WELLNESS EXAMINATION: ICD-10-CM

## 2019-01-17 PROCEDURE — 3075F SYST BP GE 130 - 139MM HG: CPT | Performed by: FAMILY MEDICINE

## 2019-01-17 PROCEDURE — 99213 OFFICE O/P EST LOW 20 MIN: CPT | Performed by: FAMILY MEDICINE

## 2019-01-17 PROCEDURE — 3079F DIAST BP 80-89 MM HG: CPT | Performed by: FAMILY MEDICINE

## 2019-01-17 PROCEDURE — 99396 PREV VISIT EST AGE 40-64: CPT | Performed by: FAMILY MEDICINE

## 2019-01-17 RX ORDER — PROMETHAZINE HYDROCHLORIDE AND CODEINE PHOSPHATE 6.25; 1 MG/5ML; MG/5ML
5 SYRUP ORAL EVERY 4 HOURS PRN
Qty: 240 ML | Refills: 0 | Status: SHIPPED | OUTPATIENT
Start: 2019-01-17 | End: 2019-03-08 | Stop reason: SDUPTHER

## 2019-01-17 NOTE — PROGRESS NOTES
[facility]        NAME: Prem Desir is a 53 y o  male  : 1965    MRN: [id number]  DATE: 2019  TIME: 9:07 AM    Assessment and Plan   Hypertension, unspecified type [I10]  1  Hypertension, unspecified type     2  Bronchitis  promethazine-codeine (PHENERGAN WITH CODEINE) 6 25-10 mg/5 mL syrup   3  Wellness examination           Patient Instructions     Patient Instructions   BP improved---cough/wheeze cont on Symbicort/proair---add spiriva---precert Ventolin          Chief Complaint     Chief Complaint   Patient presents with    Hypertension     at dr uJan Reich office    Cough     for 1 month---not better         History of Present Illness       C/o cough cont---BP better on meds      Hypertension   Associated symptoms include shortness of breath  Pertinent negatives include no chest pain or palpitations  Cough   Associated symptoms include shortness of breath and wheezing  Pertinent negatives include no chest pain, fever or sore throat  Review of Systems   Review of Systems   Constitutional: Negative for fatigue, fever and unexpected weight change  HENT: Negative for congestion, sinus pressure and sore throat  Eyes: Negative for visual disturbance  Respiratory: Positive for cough, shortness of breath and wheezing  Cardiovascular: Negative for chest pain and palpitations  Gastrointestinal: Negative for abdominal pain, diarrhea, nausea and vomiting           Current Medications       Current Outpatient Prescriptions:     albuterol (VENTOLIN HFA) 90 mcg/act inhaler, Inhale 2 puffs every 6 (six) hours as needed for wheezing, Disp: 18 g, Rfl: 3    allopurinol (ZYLOPRIM) 100 mg tablet, take 2 tablets by mouth once daily, Disp: 60 tablet, Rfl: 3    atorvastatin (LIPITOR) 20 mg tablet, Take 1 tablet (20 mg total) by mouth daily, Disp: 90 tablet, Rfl: 0    budesonide-formoterol (SYMBICORT) 160-4 5 mcg/act inhaler, Inhale 2 puffs 2 (two) times a day Rinse mouth after use , Disp: 1 Inhaler, Rfl: 10    diclofenac (VOLTAREN) 75 mg EC tablet, take 1 tablet by mouth twice a day (Patient not taking: Reported on 12/20/2018), Disp: 60 tablet, Rfl: 2    Diclofenac Sodium (PENNSAID) 2 % SOLN, Place on the skin, Disp: , Rfl:     gabapentin (NEURONTIN) 100 mg capsule, Take by mouth, Disp: , Rfl:     hydrochlorothiazide (MICROZIDE) 12 5 mg capsule, Take 12 5 mg by mouth daily, Disp: , Rfl: 0    HYDROcodone-acetaminophen (NORCO)  mg per tablet, Take 1 tablet by mouth every 6 (six) hours as needed for moderate pain Max Daily Amount: 4 tablets, Disp: 90 tablet, Rfl: 0    hylan (SYNVISC) injection, Inject into the joint, Disp: , Rfl:     indomethacin (INDOCIN) 25 mg capsule, Take by mouth every 8 (eight) hours, Disp: , Rfl:     lisinopril (ZESTRIL) 40 mg tablet, take 1 tablet by mouth once daily, Disp: 90 tablet, Rfl: 0    metFORMIN (GLUCOPHAGE) 500 mg tablet, Take 1 tablet (500 mg total) by mouth 2 (two) times a day, Disp: 180 tablet, Rfl: 0    mometasone-formoterol (DULERA) 200-5 MCG/ACT inhaler, Inhale 2 puffs 2 (two) times a day (Patient not taking: Reported on 12/20/2018 ), Disp: 3 Inhaler, Rfl: 0    Multiple Vitamin (MULTI-VITAMIN DAILY PO), Take 1 tablet by mouth daily, Disp: , Rfl:     potassium chloride (K-DUR,KLOR-CON) 10 mEq tablet, Take 1 tablet by mouth 2 (two) times a day (Patient not taking: Reported on 12/20/2018 ), Disp: 60 tablet, Rfl: 0    predniSONE 20 mg tablet, TAKE 1 TABLET BID X 5 DAYS, THEN TAKE 1 TABLET QD FOR 5 DAYS (Patient not taking: Reported on 12/20/2018 ), Disp: 15 tablet, Rfl: 0    promethazine-codeine (PHENERGAN WITH CODEINE) 6 25-10 mg/5 mL syrup, Take 5 mL by mouth every 4 (four) hours as needed for cough, Disp: 240 mL, Rfl: 0    rOPINIRole (REQUIP) 0 5 mg tablet, take 1 to 4 tablets by mouth at bedtime, Disp: 120 tablet, Rfl: 0    tiotropium (SPIRIVA RESPIMAT) 2 5 MCG/ACT AERS inhaler, Inhale 2 puffs daily (Patient not taking: Reported on 12/20/2018 ), Disp: 1 Inhaler, Rfl: 10    Current Allergies     Allergies as of 01/17/2019    (No Known Allergies)            The following portions of the patient's history were reviewed and updated as appropriate: allergies, current medications, past family history, past medical history, past social history, past surgical history and problem list      Past Medical History:   Diagnosis Date    Chronic pain     Diabetes mellitus ([address] )     Hyperlipidemia     Hypertension     Impaired fasting glucose     last assessed: 12/16/2015    Knee arthropathy     last assessed: 3/23/2016       Past Surgical History:   Procedure Laterality Date    KNEE ARTHROSCOPY Bilateral     KNEE SURGERY         Family History   Problem Relation Age of Onset    Coronary artery disease Father     Cancer Other         malignant neoplasm         Medications have been verified  Objective   /88   Pulse 88   Ht 5' 10 5" (1 791 m)   Wt (!) 147 kg (323 lb)   SpO2 98%   BMI 45 69 kg/m²        Physical Exam     Physical Exam   Constitutional: He appears well-developed and well-nourished  No distress  HENT:   Right Ear: Tympanic membrane, external ear and ear canal normal  Tympanic membrane is not injected  Left Ear: Tympanic membrane, external ear and ear canal normal  Tympanic membrane is not injected  Nose: Nose normal    Mouth/Throat: Uvula is midline, oropharynx is clear and moist and mucous membranes are normal    Eyes: Pupils are equal, round, and reactive to light  Conjunctivae are normal    Neck: Normal range of motion  Neck supple  No thyromegaly present  Cardiovascular: Normal rate, regular rhythm and normal heart sounds  No murmur heard  Pulmonary/Chest: Effort normal  No respiratory distress  He has wheezes  Lymphadenopathy:     He has no cervical adenopathy  Skin: He is not diaphoretic

## 2019-01-17 NOTE — PROGRESS NOTES
HPI:  Eliel Gaytan is a 53 y o  male here for his yearly health maintenance exam    Patient Active Problem List   Diagnosis    Hyponatremia    Diabetes mellitus, type 2 ([facility] )    [facility] to [facility]    Weakness generalized    Hypokalemia    Hypertension    Hyperlipidemia    Chronic pain syndrome     Past Medical History:   Diagnosis Date    Chronic pain     Diabetes mellitus ([facility] )     Hyperlipidemia     Hypertension     Impaired fasting glucose     last assessed: 12/16/2015    Knee arthropathy     last assessed: 3/23/2016       1  Advanced Directive: n     2  Durable Power of  for Healthcare: n     3  Social History:           Drug and alcohol History: n                  4  Immunizations up to date: y                 Lifestyle:                           Healthy Diet:y                          Alcohol Use:y                          Tobacco Use:n                          Regular exercise:y                          Weight concerns:y                               5  Over the past 2 weeks, how often have you been bothered by the following:              Little interest or pleasure in doing things:n              Felling down, depressed or hopeless:n       Current Outpatient Prescriptions   Medication Sig Dispense Refill    albuterol (VENTOLIN HFA) 90 mcg/act inhaler Inhale 2 puffs every 6 (six) hours as needed for wheezing 18 g 3    allopurinol (ZYLOPRIM) 100 mg tablet take 2 tablets by mouth once daily 60 tablet 3    atorvastatin (LIPITOR) 20 mg tablet Take 1 tablet (20 mg total) by mouth daily 90 tablet 0    budesonide-formoterol (SYMBICORT) 160-4 5 mcg/act inhaler Inhale 2 puffs 2 (two) times a day Rinse mouth after use   1 Inhaler 10    diclofenac (VOLTAREN) 75 mg EC tablet take 1 tablet by mouth twice a day (Patient not taking: Reported on 12/20/2018) 60 tablet 2    Diclofenac Sodium (PENNSAID) 2 % SOLN Place on the skin      gabapentin (NEURONTIN) 100 mg capsule Take by mouth      hydrochlorothiazide (MICROZIDE) 12 5 mg capsule Take 12 5 mg by mouth daily  0    HYDROcodone-acetaminophen (NORCO)  mg per tablet Take 1 tablet by mouth every 6 (six) hours as needed for moderate pain Max Daily Amount: 4 tablets 90 tablet 0    hylan (SYNVISC) injection Inject into the joint      indomethacin (INDOCIN) 25 mg capsule Take by mouth every 8 (eight) hours      lisinopril (ZESTRIL) 40 mg tablet take 1 tablet by mouth once daily 90 tablet 0    metFORMIN (GLUCOPHAGE) 500 mg tablet Take 1 tablet (500 mg total) by mouth 2 (two) times a day 180 tablet 0    mometasone-formoterol (DULERA) 200-5 MCG/ACT inhaler Inhale 2 puffs 2 (two) times a day (Patient not taking: Reported on 12/20/2018 ) 3 Inhaler 0    Multiple Vitamin (MULTI-VITAMIN DAILY PO) Take 1 tablet by mouth daily      potassium chloride (K-DUR,KLOR-CON) 10 mEq tablet Take 1 tablet by mouth 2 (two) times a day (Patient not taking: Reported on 12/20/2018 ) 60 tablet 0    predniSONE 20 mg tablet TAKE 1 TABLET BID X 5 DAYS,  THEN TAKE 1 TABLET QD FOR 5 DAYS (Patient not taking: Reported on 12/20/2018 ) 15 tablet 0    promethazine-codeine (PHENERGAN WITH CODEINE) 6 25-10 mg/5 mL syrup Take 5 mL by mouth every 4 (four) hours as needed for cough 240 mL 0    rOPINIRole (REQUIP) 0 5 mg tablet take 1 to 4 tablets by mouth at bedtime 120 tablet 0    tiotropium (SPIRIVA RESPIMAT) 2 5 MCG/ACT AERS inhaler Inhale 2 puffs daily (Patient not taking: Reported on 12/20/2018 ) 1 Inhaler 10     No current facility-administered medications for this visit        No Known Allergies  Immunization History   Administered Date(s) Administered    Influenza 09/03/2014, 12/16/2015, 09/02/2016    Influenza Quadrivalent Preservative Free 3 years and older IM 12/16/2015    Influenza TIV (IM) 09/03/2014, 09/02/2016, 08/30/2017    Pneumococcal Conjugate 13-Valent 08/30/2017    Pneumococcal Polysaccharide PPV23 08/12/2013    Tdap 07/28/2018       Patient Care Team:  Armaan Schultz MD as PCP - MD Gayle Meyer MD    Review of Systems   Constitutional: Negative for fatigue, fever and unexpected weight change  HENT: Negative for congestion, sinus pain and sore throat  Eyes: Negative for visual disturbance  Respiratory: Negative for shortness of breath and wheezing  Cardiovascular: Negative for chest pain and palpitations  Gastrointestinal: Negative for abdominal pain, nausea and vomiting  Musculoskeletal: Negative  Negative for arthralgias and myalgias  Neurological: Negative for syncope, weakness and numbness  Psychiatric/Behavioral: Negative  Negative for confusion, dysphoric mood and suicidal ideas  Physical Exam :  Physical Exam   Constitutional: He is oriented to person, place, and time  Vital signs are normal  He appears well-developed and well-nourished  HENT:   Right Ear: Ear canal normal  Tympanic membrane is not injected  Left Ear: Ear canal normal  Tympanic membrane is not injected  Nose: Nose normal    Mouth/Throat: Oropharynx is clear and moist    Eyes: Pupils are equal, round, and reactive to light  Conjunctivae and EOM are normal  Right eye exhibits no discharge  Left eye exhibits no discharge  Neck: Normal range of motion  Neck supple  No thyromegaly present  Cardiovascular: Normal rate, regular rhythm and normal heart sounds  No murmur heard  Pulmonary/Chest: Effort normal and breath sounds normal  No respiratory distress  He has no wheezes  Abdominal: Soft  Bowel sounds are normal  He exhibits no distension  There is no tenderness  Musculoskeletal: Normal range of motion  Lymphadenopathy:     He has no cervical adenopathy  Neurological: He is alert and oriented to person, place, and time  He has normal strength and normal reflexes  He is not disoriented  No sensory deficit  Gait normal    Skin: Skin is warm and dry     Psychiatric: He has a normal mood and affect  His speech is normal and behavior is normal  Judgment and thought content normal  Cognition and memory are normal          Assessment and Plan:  1  Hypertension, unspecified type     2  Bronchitis  promethazine-codeine (PHENERGAN WITH CODEINE) 6 25-10 mg/5 mL syrup   3   Wellness examination         Health Maintenance Due   Topic Date Due    Hepatitis C Screening  1965    URINE MICROALBUMIN  02/17/2018    INFLUENZA VACCINE  07/01/2018    HEMOGLOBIN A1C  01/14/2019

## 2019-01-21 ENCOUNTER — TELEPHONE (OUTPATIENT)
Dept: PAIN MEDICINE | Facility: CLINIC | Age: 54
End: 2019-01-21

## 2019-01-21 ENCOUNTER — HOSPITAL ENCOUNTER (EMERGENCY)
Facility: HOSPITAL | Age: 54
Discharge: HOME | End: 2019-01-21
Attending: EMERGENCY MEDICINE
Payer: COMMERCIAL

## 2019-01-21 ENCOUNTER — APPOINTMENT (EMERGENCY)
Dept: RADIOLOGY | Facility: HOSPITAL | Age: 54
End: 2019-01-21
Payer: COMMERCIAL

## 2019-01-21 VITALS
TEMPERATURE: 97.7 F | SYSTOLIC BLOOD PRESSURE: 162 MMHG | OXYGEN SATURATION: 97 % | WEIGHT: 315 LBS | BODY MASS INDEX: 45.1 KG/M2 | RESPIRATION RATE: 18 BRPM | HEART RATE: 110 BPM | HEIGHT: 70 IN | DIASTOLIC BLOOD PRESSURE: 76 MMHG

## 2019-01-21 DIAGNOSIS — E86.0 MILD DEHYDRATION: ICD-10-CM

## 2019-01-21 DIAGNOSIS — J45.901 ASTHMA WITH ACUTE EXACERBATION: ICD-10-CM

## 2019-01-21 DIAGNOSIS — B34.9 ACUTE VIRAL SYNDROME: Primary | ICD-10-CM

## 2019-01-21 LAB
ALBUMIN SERPL BCP-MCNC: 3.7 G/DL (ref 3.5–5)
ALP SERPL-CCNC: 116 U/L (ref 46–116)
ALT SERPL W P-5'-P-CCNC: 30 U/L (ref 12–78)
ANION GAP SERPL CALCULATED.3IONS-SCNC: 12 MMOL/L (ref 4–13)
AST SERPL W P-5'-P-CCNC: 13 U/L (ref 5–45)
ATRIAL RATE: 116 BPM
BACTERIA UR QL AUTO: ABNORMAL /HPF
BASOPHILS # BLD AUTO: 0.05 THOUSANDS/ΜL (ref 0–0.1)
BASOPHILS NFR BLD AUTO: 1 % (ref 0–1)
BILIRUB SERPL-MCNC: 0.8 MG/DL (ref 0.2–1)
BILIRUB UR QL STRIP: ABNORMAL
BUN SERPL-MCNC: 11 MG/DL (ref 5–25)
CALCIUM SERPL-MCNC: 8.9 MG/DL (ref 8.3–10.1)
CHLORIDE SERPL-SCNC: 93 MMOL/L (ref 100–108)
CLARITY UR: CLEAR
CO2 SERPL-SCNC: 26 MMOL/L (ref 21–32)
COLOR UR: YELLOW
CREAT SERPL-MCNC: 0.93 MG/DL (ref 0.6–1.3)
EOSINOPHIL # BLD AUTO: 0.13 THOUSAND/ΜL (ref 0–0.61)
EOSINOPHIL NFR BLD AUTO: 1 % (ref 0–6)
ERYTHROCYTE [DISTWIDTH] IN BLOOD BY AUTOMATED COUNT: 12.3 % (ref 11.6–15.1)
GFR SERPL CREATININE-BSD FRML MDRD: 92 ML/MIN/1.73SQ M
GLUCOSE SERPL-MCNC: 340 MG/DL (ref 65–140)
GLUCOSE UR STRIP-MCNC: ABNORMAL MG/DL
HCT VFR BLD AUTO: 47.1 % (ref 36.5–49.3)
HGB BLD-MCNC: 15.8 G/DL (ref 12–17)
HGB UR QL STRIP.AUTO: ABNORMAL
IMM GRANULOCYTES # BLD AUTO: 0.05 THOUSAND/UL (ref 0–0.2)
IMM GRANULOCYTES NFR BLD AUTO: 1 % (ref 0–2)
KETONES UR STRIP-MCNC: ABNORMAL MG/DL
LACTATE SERPL-SCNC: 1.2 MMOL/L (ref 0.5–2)
LEUKOCYTE ESTERASE UR QL STRIP: NEGATIVE
LYMPHOCYTES # BLD AUTO: 1.15 THOUSANDS/ΜL (ref 0.6–4.47)
LYMPHOCYTES NFR BLD AUTO: 12 % (ref 14–44)
MAGNESIUM SERPL-MCNC: 1.9 MG/DL (ref 1.6–2.6)
MCH RBC QN AUTO: 29.4 PG (ref 26.8–34.3)
MCHC RBC AUTO-ENTMCNC: 33.5 G/DL (ref 31.4–37.4)
MCV RBC AUTO: 88 FL (ref 82–98)
MONOCYTES # BLD AUTO: 1.07 THOUSAND/ΜL (ref 0.17–1.22)
MONOCYTES NFR BLD AUTO: 11 % (ref 4–12)
NEUTROPHILS # BLD AUTO: 6.91 THOUSANDS/ΜL (ref 1.85–7.62)
NEUTS SEG NFR BLD AUTO: 74 % (ref 43–75)
NITRITE UR QL STRIP: NEGATIVE
NON-SQ EPI CELLS URNS QL MICRO: ABNORMAL /HPF
NRBC BLD AUTO-RTO: 0 /100 WBCS
P AXIS: 68 DEGREES
PH UR STRIP.AUTO: 5.5 [PH] (ref 4.5–8)
PLATELET # BLD AUTO: 155 THOUSANDS/UL (ref 149–390)
PMV BLD AUTO: 9.2 FL (ref 8.9–12.7)
POTASSIUM SERPL-SCNC: 3.5 MMOL/L (ref 3.5–5.3)
PR INTERVAL: 148 MS
PROT SERPL-MCNC: 7.9 G/DL (ref 6.4–8.2)
PROT UR STRIP-MCNC: ABNORMAL MG/DL
QRS AXIS: -71 DEGREES
QRSD INTERVAL: 98 MS
QT INTERVAL: 340 MS
QTC INTERVAL: 472 MS
RBC # BLD AUTO: 5.38 MILLION/UL (ref 3.88–5.62)
RBC #/AREA URNS AUTO: ABNORMAL /HPF
SODIUM SERPL-SCNC: 131 MMOL/L (ref 136–145)
SP GR UR STRIP.AUTO: >=1.03 (ref 1–1.03)
T WAVE AXIS: 72 DEGREES
TROPONIN I SERPL-MCNC: <0.02 NG/ML
UROBILINOGEN UR QL STRIP.AUTO: 0.2 E.U./DL
VENTRICULAR RATE: 116 BPM
WBC # BLD AUTO: 9.36 THOUSAND/UL (ref 4.31–10.16)
WBC #/AREA URNS AUTO: ABNORMAL /HPF

## 2019-01-21 PROCEDURE — 84484 ASSAY OF TROPONIN QUANT: CPT | Performed by: EMERGENCY MEDICINE

## 2019-01-21 PROCEDURE — 87040 BLOOD CULTURE FOR BACTERIA: CPT | Performed by: EMERGENCY MEDICINE

## 2019-01-21 PROCEDURE — 94640 AIRWAY INHALATION TREATMENT: CPT

## 2019-01-21 PROCEDURE — 83735 ASSAY OF MAGNESIUM: CPT | Performed by: EMERGENCY MEDICINE

## 2019-01-21 PROCEDURE — 81001 URINALYSIS AUTO W/SCOPE: CPT | Performed by: EMERGENCY MEDICINE

## 2019-01-21 PROCEDURE — 80053 COMPREHEN METABOLIC PANEL: CPT | Performed by: EMERGENCY MEDICINE

## 2019-01-21 PROCEDURE — 36415 COLL VENOUS BLD VENIPUNCTURE: CPT | Performed by: EMERGENCY MEDICINE

## 2019-01-21 PROCEDURE — 96360 HYDRATION IV INFUSION INIT: CPT

## 2019-01-21 PROCEDURE — 93010 ELECTROCARDIOGRAM REPORT: CPT | Performed by: INTERNAL MEDICINE

## 2019-01-21 PROCEDURE — 99284 EMERGENCY DEPT VISIT MOD MDM: CPT

## 2019-01-21 PROCEDURE — 83605 ASSAY OF LACTIC ACID: CPT | Performed by: EMERGENCY MEDICINE

## 2019-01-21 PROCEDURE — 71046 X-RAY EXAM CHEST 2 VIEWS: CPT

## 2019-01-21 PROCEDURE — 85025 COMPLETE CBC W/AUTO DIFF WBC: CPT | Performed by: EMERGENCY MEDICINE

## 2019-01-21 PROCEDURE — 93005 ELECTROCARDIOGRAM TRACING: CPT

## 2019-01-21 RX ORDER — LEVALBUTEROL 1.25 MG/.5ML
2.5 SOLUTION, CONCENTRATE RESPIRATORY (INHALATION) ONCE
Status: COMPLETED | OUTPATIENT
Start: 2019-01-21 | End: 2019-01-21

## 2019-01-21 RX ORDER — ACETAMINOPHEN 325 MG/1
975 TABLET ORAL ONCE
Status: COMPLETED | OUTPATIENT
Start: 2019-01-21 | End: 2019-01-21

## 2019-01-21 RX ORDER — BENZONATATE 100 MG/1
100 CAPSULE ORAL ONCE
Status: COMPLETED | OUTPATIENT
Start: 2019-01-21 | End: 2019-01-21

## 2019-01-21 RX ADMIN — ACETAMINOPHEN 975 MG: 325 TABLET, FILM COATED ORAL at 10:25

## 2019-01-21 RX ADMIN — BENZONATATE 100 MG: 100 CAPSULE ORAL at 10:25

## 2019-01-21 RX ADMIN — SODIUM CHLORIDE 1000 ML: 0.9 INJECTION, SOLUTION INTRAVENOUS at 10:21

## 2019-01-21 RX ADMIN — LEVALBUTEROL 2.5 MG: 1.25 SOLUTION, CONCENTRATE RESPIRATORY (INHALATION) at 10:26

## 2019-01-21 NOTE — DISCHARGE INSTRUCTIONS
Dehydration   WHAT YOU NEED TO KNOW:   Dehydration is a condition that develops when your body does not have enough fluid  You may become dehydrated if you do not drink enough water or lose too much fluid  Fluid loss may also cause loss of electrolytes (minerals), such as sodium  DISCHARGE INSTRUCTIONS:   Seek care immediately if:   · You have a seizure  · You are confused or cannot think clearly  · You are extremely sleepy, or another person cannot wake you  · You become dizzy or faint when you stand  · You are not able to urinate  · You have trouble breathing  · You have a fast or irregular heartbeat  · Your hands or feet are cold, or your face is pale  Contact your healthcare provider if:   · You have trouble drinking liquids because you are vomiting  · Your symptoms get worse  · You have a fever  · You feel very weak or tired  · You have questions or concerns about your condition or care  Follow up with your healthcare provider as directed:  Write down your questions so you remember to ask them during your visits  Prevent or manage dehydration:   · Drink liquids as directed  Liquids that contain water, sugar, and minerals can help your body hold in fluid and help prevent dehydration  Drink liquids throughout the day, not just when you feel thirsty  Men should drink about 3 liters (13 eight-ounce cups) of liquid each day  Women should drink about 2 liters (9 eight-ounce cups) of liquid each day  Drink even more liquid if you will be outdoors, in the sun for a long time, or exercising  · Stay cool  Limit the time you spend outdoors during the hottest part of the day  Dress in lightweight clothes  · Keep track of how often you urinate  If you urinate less than usual or your urine is darker, drink more liquids    © 2017 [address] Information is for End User's use only and may not be sold, redistributed or otherwise used for commercial purposes  All illustrations and images included in CareNotes® are the copyrighted property of [employer]  or Julius Duarte  The above information is an  only  It is not intended as medical advice for individual conditions or treatments  Talk to your doctor, nurse or pharmacist before following any medical regimen to see if it is safe and effective for you  Viral Syndrome   WHAT YOU NEED TO KNOW:   Viral syndrome is a term used for a viral infection that has no clear cause  Viruses are spread easily from person to person through the air and on shared items  DISCHARGE INSTRUCTIONS:   Call 911 for the following:   · You have a seizure  · You cannot be woken  · You have chest pain or trouble breathing  Seek care immediately if:   · You have a stiff neck, a bad headache, and sensitivity to light  · You feel weak, dizzy, or confused  · You stop urinating or urinate a lot less than normal      · You cough up blood or thick, yellow or green, mucus  · You have severe abdominal pain or your abdomen is larger than usual   Contact your healthcare provider if:   · Your symptoms do not get better with treatment, or get worse, after 3 days  · You have a rash or ear pain  · You have burning when you urinate  · You have questions or concerns about your condition or care  Medicines: You may  need any of the following:  · Acetaminophen  decreases pain and fever  It is available without a doctor's order  Ask how much medicine to take and how often to take it  Follow directions  Acetaminophen can cause liver damage if not taken correctly  · NSAIDs , such as ibuprofen, help decrease swelling, pain, and fever  NSAIDs can cause stomach bleeding or kidney problems in certain people  If you take blood thinner medicine, always ask your healthcare provider if NSAIDs are safe for you  Always read the medicine label and follow directions      · Cold medicine  helps decrease swelling, control a cough, and relieve chest or nasal congestion  · Saline nasal spray  helps decrease nasal congestion  · Take your medicine as directed  Contact your healthcare provider if you think your medicine is not helping or if you have side effects  Tell him of her if you are allergic to any medicine  Keep a list of the medicines, vitamins, and herbs you take  Include the amounts, and when and why you take them  Bring the list or the pill bottles to follow-up visits  Carry your medicine list with you in case of an emergency  Manage your symptoms:   · Drink liquids as directed  to prevent dehydration  Ask how much liquid to drink each day and which liquids are best for you  Ask if you should drink an oral rehydration solution (ORS)  An ORS has the right amounts of water, salts, and sugar you need to replace body fluids  This may help prevent dehydration caused by vomiting or diarrhea  Do not drink liquids with caffeine  Drinks with caffeine can make dehydration worse  · Get plenty of rest  to help your body heal  Take naps throughout the day  Ask your healthcare provider when you can return to work and your normal activities  · Use a cool mist humidifier  to help you breathe easier if you have nasal or chest congestion  Ask your healthcare provider how to use a cool mist humidifier  · Eat honey or use cough drops  to help decrease throat discomfort  Ask your healthcare provider how much honey you should eat each day  Cough drops are available without a doctor's order  Follow directions for taking cough drops  · Do not smoke and stay away from others who smoke  Nicotine and other chemicals in cigarettes and cigars can cause lung damage  Smoking can also delay healing  Ask your healthcare provider for information if you currently smoke and need help to quit  E-cigarettes or smokeless tobacco still contain nicotine   Talk to your healthcare provider before you use these products  · Wash your hands frequently  to prevent the spread of germs to others  Use soap and water  Use gel hand  when soap and water are not available  Wash your hands after you use the bathroom, cough, or sneeze  Wash your hands before you prepare or eat food  Follow up with your healthcare provider as directed:  Write down your questions so you remember to ask them during your visits  © 2017 [address] Information is for End User's use only and may not be sold, redistributed or otherwise used for commercial purposes  All illustrations and images included in CareNotes® are the copyrighted property of [employer]  or [employer]arte  The above information is an  only  It is not intended as medical advice for individual conditions or treatments  Talk to your doctor, nurse or pharmacist before following any medical regimen to see if it is safe and effective for you  Asthma   WHAT YOU NEED TO KNOW:   Asthma is a lung disease that makes breathing difficult  Chronic inflammation and reactions to triggers narrow the airways in the lungs  Asthma can become life-threatening if it is not managed  DISCHARGE INSTRUCTIONS:   Seek care immediately if:   · You have severe shortness of breath  · Your lips or nails turn blue or gray  · The skin around your neck and ribs pulls in with each breath  · You have shortness of breath, even after you take your short-term medicine as directed  · Your peak flow numbers are in the red zone of your AAP  Contact your healthcare provider if:   · You run out of medicine before your next refill is due  · Your symptoms get worse  · You need to take more medicine than usual to control your symptoms  · You have questions or concerns about your condition or care  Medicines:   · Medicines  decrease inflammation, open airways, and make it easier to breathe   Medicines may be inhaled, taken as a pill, or injected  Short-term medicines relieve your symptoms quickly  Long-term medicines are used to prevent future attacks  You may also need medicine to help control your allergies  Ask your healthcare provider for more information about the medicine you are given and how to take it safely  · Take your medicine as directed  Contact your healthcare provider if you think your medicine is not helping or if you have side effects  Tell him or her if you are allergic to any medicine  Keep a list of the medicines, vitamins, and herbs you take  Include the amounts, and when and why you take them  Bring the list or the pill bottles to follow-up visits  Carry your medicine list with you in case of an emergency  Follow up with your healthcare provider as directed: You will need to return to make sure your medicine is working and your symptoms are controlled  You may be referred to an asthma specialist  Fidencio Mansfield may be asked to keep a record of your peak flow values and bring it with you to your appointments  Write down your questions so you remember to ask them  Manage your symptoms and prevent future attacks:   · Follow your Asthma Action Plan (AAP)  This is a written plan that you and your healthcare provider create  It explains which medicine you need and when to change doses if necessary  It also explains how you can monitor symptoms and use a peak flow meter  The meter measures how well your lungs are working  · Manage other health conditions , such as allergies, acid reflux, and sleep apnea  · Identify and avoid triggers  These may include pets, dust mites, mold, and cockroaches  · Do not smoke or be around others who smoke  Nicotine and other chemicals in cigarettes and cigars can cause lung damage  Ask your healthcare provider for information if you currently smoke and need help to quit  E-cigarettes or smokeless tobacco still contain nicotine   Talk to your healthcare provider before you use these products  · Ask about the flu vaccine  The flu can make your asthma worse  You may need a yearly flu shot  © 2017 [address] Information is for End User's use only and may not be sold, redistributed or otherwise used for commercial purposes  All illustrations and images included in CareNotes® are the copyrighted property of [employer]  or [employer]arte  The above information is an  only  It is not intended as medical advice for individual conditions or treatments  Talk to your doctor, nurse or pharmacist before following any medical regimen to see if it is safe and effective for you

## 2019-01-21 NOTE — ED PROVIDER NOTES
History  Chief Complaint   Patient presents with    Cough     Patient states he has had a cough and congestion for approx 6 weeks  Pt did see his PCP and diagnosed with Bronchitis and given antibiotic  Patient states he has been having cramps in his legs for the last 4 day  Patient denies and N/V/D  Elizabeth Gossy states he has been taking potassium at home     Patient complains of 6 weeks sinus and chest congestion and 3 days fatigue short of breath, muscles cramps  Took tylenol last night  Mild cough with white mucus  Patient using albuterol inhaler without relief  Prior to Admission Medications   Prescriptions Last Dose Informant Patient Reported? Taking? Diclofenac Sodium (PENNSAID) 2 % SOLN Not Taking at Unknown time  Yes No   Sig: Place on the skin   HYDROcodone-acetaminophen (NORCO)  mg per tablet   No Yes   Sig: Take 1 tablet by mouth every 6 (six) hours as needed for moderate pain Max Daily Amount: 4 tablets   Multiple Vitamin (MULTI-VITAMIN DAILY PO)   Yes Yes   Sig: Take 1 tablet by mouth daily   albuterol (VENTOLIN HFA) 90 mcg/act inhaler Not Taking at Unknown time  No No   Sig: Inhale 2 puffs every 6 (six) hours as needed for wheezing   Patient not taking: Reported on 1/21/2019    allopurinol (ZYLOPRIM) 100 mg tablet   No Yes   Sig: take 2 tablets by mouth once daily   atorvastatin (LIPITOR) 20 mg tablet   No Yes   Sig: Take 1 tablet (20 mg total) by mouth daily   budesonide-formoterol (SYMBICORT) 160-4 5 mcg/act inhaler   No Yes   Sig: Inhale 2 puffs 2 (two) times a day Rinse mouth after use     diclofenac (VOLTAREN) 75 mg EC tablet Not Taking at Unknown time  No No   Sig: take 1 tablet by mouth twice a day   Patient not taking: Reported on 12/20/2018   gabapentin (NEURONTIN) 100 mg capsule Not Taking at Unknown time  Yes No   Sig: Take by mouth   hydrochlorothiazide (MICROZIDE) 12 5 mg capsule Not Taking at Unknown time  Yes No   Sig: Take 12 5 mg by mouth daily   hylan (SYNVISC) injection Not Taking at Unknown time  Yes No   Sig: Inject into the joint   indomethacin (INDOCIN) 25 mg capsule   Yes Yes   Sig: Take by mouth every 8 (eight) hours   lisinopril (ZESTRIL) 40 mg tablet   No Yes   Sig: take 1 tablet by mouth once daily   metFORMIN (GLUCOPHAGE) 500 mg tablet   No Yes   Sig: Take 1 tablet (500 mg total) by mouth 2 (two) times a day   Patient taking differently: Take 500 mg by mouth daily with breakfast     mometasone-formoterol (DULERA) 200-5 MCG/ACT inhaler   No Yes   Sig: Inhale 2 puffs 2 (two) times a day   potassium chloride (K-DUR,KLOR-CON) 10 mEq tablet   No Yes   Sig: Take 1 tablet by mouth 2 (two) times a day   predniSONE 20 mg tablet Not Taking at Unknown time  No No   Sig: TAKE 1 TABLET BID X 5 DAYS,  THEN TAKE 1 TABLET QD FOR 5 DAYS   Patient not taking: Reported on 12/20/2018    promethazine-codeine (PHENERGAN WITH CODEINE) 6 25-10 mg/5 mL syrup Not Taking at Unknown time  No No   Sig: Take 5 mL by mouth every 4 (four) hours as needed for cough   Patient not taking: Reported on 1/21/2019    rOPINIRole (REQUIP) 0 5 mg tablet Not Taking at Unknown time  No No   Sig: take 1 to 4 tablets by mouth at bedtime   Patient not taking: Reported on 1/21/2019   tiotropium (SPIRIVA RESPIMAT) 2 5 MCG/ACT AERS inhaler   No Yes   Sig: Inhale 2 puffs daily      Facility-Administered Medications: None       Past Medical History:   Diagnosis Date    Asthma     Chronic pain     Diabetes mellitus ([facility] [address] )     Hyperlipidemia     Hypertension     Impaired fasting glucose     last assessed: 12/16/2015    Knee arthropathy     last assessed: 3/23/2016       Past Surgical History:   Procedure Laterality Date    KNEE ARTHROSCOPY Bilateral     KNEE SURGERY         Family History   Problem Relation Age of Onset    Coronary artery disease Father     Cancer Other         malignant neoplasm     I have reviewed and agree with the history as documented      Social History   Substance Use Topics    Smoking status: Never Smoker    Smokeless tobacco: Never Used    Alcohol use Yes      Comment: 2 light beers daily        Review of Systems   All other systems reviewed and are negative  Physical Exam  Physical Exam   Constitutional: He is oriented to person, place, and time  He appears well-developed and well-nourished  HENT:   Mouth/Throat: Mucous membranes are dry  Eyes: Pupils are equal, round, and reactive to light  Conjunctivae and EOM are normal    Neck: Normal range of motion  Neck supple  No spinous process tenderness present  Cardiovascular: Regular rhythm, normal heart sounds and intact distal pulses  Tachycardia present  Pulmonary/Chest: Effort normal  No respiratory distress  He has wheezes  Abdominal: Soft  Bowel sounds are normal  He exhibits no distension  There is no tenderness  Musculoskeletal: Normal range of motion  Neurological: He is alert and oriented to person, place, and time  He has normal strength  No sensory deficit  GCS eye subscore is 4  GCS verbal subscore is 5  GCS motor subscore is 6  Skin: Skin is warm  No rash noted  He is diaphoretic  Psychiatric: He has a normal mood and affect  Nursing note and vitals reviewed        Vital Signs  ED Triage Vitals [01/21/19 0942]   Temperature Pulse Respirations Blood Pressure SpO2   97 9 °F (36 6 °C) (!) 117 (!) 26 (!) 176/101 98 %      Temp src Heart Rate Source Patient Position - Orthostatic VS BP Location FiO2 (%)   -- -- -- -- --      Pain Score       No Pain           Vitals:    01/21/19 1030 01/21/19 1100 01/21/19 1145 01/21/19 1200   BP: 140/72 159/78 162/76    Pulse: (!) 110 103 (!) 113 (!) 110       Visual Acuity      ED Medications  Medications   sodium chloride 0 9 % bolus 1,000 mL (0 mL Intravenous Stopped 1/21/19 1117)   levalbuterol (XOPENEX) inhalation solution 2 5 mg (2 5 mg Nebulization Given 1/21/19 1026)   benzonatate (TESSALON PERLES) capsule 100 mg (100 mg Oral Given 1/21/19 1025)   acetaminophen (TYLENOL) tablet 975 mg (975 mg Oral Given 1/21/19 1025)       Diagnostic Studies  Results Reviewed     Procedure Component Value Units Date/Time    Urine Microscopic [[id number]]  (Abnormal) Collected:  01/21/19 1137    Lab Status:  Final result Specimen:  Urine from Urine, Clean Catch Updated:  01/21/19 1157     RBC, UA 2-4 (A) /hpf      WBC, UA None Seen /hpf      Epithelial Cells Occasional /hpf      Bacteria, UA Occasional /hpf     UA w Reflex to Microscopic w Reflex to Culture [[id number]]  (Abnormal) Collected:  01/21/19 1137    Lab Status:  Final result Specimen:  Urine from Urine, Clean Catch Updated:  01/21/19 1148     Color, UA Yellow     Clarity, UA Clear     Specific Gravity, UA >=1 030     pH, UA 5 5     Leukocytes, UA Negative     Nitrite, UA Negative     Protein, UA 30 (1+) (A) mg/dl      Glucose,  (1/2%) (A) mg/dl      Ketones, UA >=80 (3+) (A) mg/dl      Urobilinogen, UA 0 2 E U /dl      Bilirubin, UA Interference- unable to analyze (A)     Blood, UA Small (A)    Lactic acid x2 Q2H [[id number]]  (Normal) Collected:  01/21/19 1021    Lab Status:  Final result Specimen:  Blood from Arm, Right Updated:  01/21/19 1053     LACTIC ACID 1 2 mmol/L     Narrative:         Result may be elevated if tourniquet was used during collection  Blood culture [[id number]] Collected:  01/21/19 1021    Lab Status:   In process Specimen:  Blood from Arm, Right Updated:  01/21/19 1035    Troponin I [[id number]]  (Normal) Collected:  01/21/19 0956    Lab Status:  Final result Specimen:  Blood from Arm, Right Updated:  01/21/19 1030     Troponin I <0 02 ng/mL     Comprehensive metabolic panel [[id number]]  (Abnormal) Collected:  01/21/19 0956    Lab Status:  Final result Specimen:  Blood from Arm, Right Updated:  01/21/19 1028     Sodium 131 (L) mmol/L      Potassium 3 5 mmol/L      Chloride 93 (L) mmol/L      CO2 26 mmol/L      ANION GAP 12 mmol/L      BUN 11 mg/dL      Creatinine 0 93 mg/dL      Glucose 340 (H) mg/dL Calcium 8 9 mg/dL      AST 13 U/L      ALT 30 U/L      Alkaline Phosphatase 116 U/L      Total Protein 7 9 g/dL      Albumin 3 7 g/dL      Total Bilirubin 0 80 mg/dL      eGFR 92 ml/min/1 73sq m     Narrative:         National Kidney Disease Education Program recommendations are as follows:  GFR calculation is accurate only with a steady state creatinine  Chronic Kidney disease less than 60 ml/min/1 73 sq  meters  Kidney failure less than 15 ml/min/1 73 sq  meters  Magnesium [[id number]]  (Normal) Collected:  01/21/19 0956    Lab Status:  Final result Specimen:  Blood from Arm, Right Updated:  01/21/19 1028     Magnesium 1 9 mg/dL     Blood culture [[id number]] Collected:  01/21/19 1013    Lab Status: In process Specimen:  Blood from Arm, Right Updated:  01/21/19 1016    CBC and differential [[id number]]  (Abnormal) Collected:  01/21/19 0956    Lab Status:  Final result Specimen:  Blood from Arm, Right Updated:  01/21/19 1011     WBC 9 36 Thousand/uL      RBC 5 38 Million/uL      Hemoglobin 15 8 g/dL      Hematocrit 47 1 %      MCV 88 fL      MCH 29 4 pg      MCHC 33 5 g/dL      RDW 12 3 %      MPV 9 2 fL      Platelets 510 Thousands/uL      nRBC 0 /100 WBCs      Neutrophils Relative 74 %      Immat GRANS % 1 %      Lymphocytes Relative 12 (L) %      Monocytes Relative 11 %      Eosinophils Relative 1 %      Basophils Relative 1 %      Neutrophils Absolute 6 91 Thousands/µL      Immature Grans Absolute 0 05 Thousand/uL      Lymphocytes Absolute 1 15 Thousands/µL      Monocytes Absolute 1 07 Thousand/µL      Eosinophils Absolute 0 13 Thousand/µL      Basophils Absolute 0 05 Thousands/µL                  X-ray chest 2 views   Final Result by Osvaldo Willams DO (01/21 1042)   No acute cardiopulmonary disease              Workstation performed: [id number]                    Procedures  ECG 12 Lead Documentation  Date/Time: 1/21/2019 9:53 AM  Performed by: Lady Meek by: Wilmer Conner / Diagnosis:  Sob  ECG reviewed by me, the ED Provider: yes    Patient location:  ED  Previous ECG:     Previous ECG:  Compared to current    Comparison ECG info:  8-17    Similarity:  Changes noted  Interpretation:     Interpretation: abnormal    Rate:     ECG rate:  116    ECG rate assessment: tachycardic    Rhythm:     Rhythm: sinus tachycardia    Ectopy:     Ectopy: none    QRS:     QRS axis:  Normal    QRS intervals:  Normal  Conduction:     Conduction: abnormal      Abnormal conduction: LAFB    ST segments:     ST segments:  Normal  T waves:     T waves: normal               Phone Contacts  ED Phone Contact    ED Course  ED Course as of Jan 21 1559 Mon Jan 21, 2019   1129 Awaiting urinalysis  Will hold on steroids as patient is diabetic  MDM  Number of Diagnoses or Management Options  Acute viral syndrome: new and requires workup  Asthma with acute exacerbation: new and requires workup  Mild dehydration: new and requires workup     Amount and/or Complexity of Data Reviewed  Clinical lab tests: ordered and reviewed  Tests in the radiology section of CPT®: ordered and reviewed    Patient Progress  Patient progress: improved    CritCare Time    Disposition  Final diagnoses:   Acute viral syndrome   Mild dehydration   Asthma with acute exacerbation     Time reflects when diagnosis was documented in both MDM as applicable and the Disposition within this note     Time User Action Codes Description Comment    1/21/2019 11:30 AM Tiajuana Conch Add [B34 9] Acute viral syndrome     [facility] [E86 0] Mild dehydration     1/21/2019 11:31 [facility] [J45 901] Asthma with acute exacerbation       ED Disposition     ED Disposition Condition Comment    Discharge  Rene Mayorga [facility] discharge to home/self care      Condition at discharge: Good        Follow-up Information     Follow up With Specialties Details Why Contact Info    Harshad Medellin MD Family Medicine  As needed [address]  [address]  [facility] [address]  [phone]            Discharge Medication List as of 1/21/2019 11:53 AM      CONTINUE these medications which have NOT CHANGED    Details   albuterol (VENTOLIN HFA) 90 mcg/act inhaler Inhale 2 puffs every 6 (six) hours as needed for wheezing, Starting Mon 1/14/2019, Normal      allopurinol (ZYLOPRIM) 100 mg tablet take 2 tablets by mouth once daily, Normal      atorvastatin (LIPITOR) 20 mg tablet Take 1 tablet (20 mg total) by mouth daily, Starting Tue 11/6/2018, Normal      budesonide-formoterol (SYMBICORT) 160-4 5 mcg/act inhaler Inhale 2 puffs 2 (two) times a day Rinse mouth after use , Starting Mon 12/17/2018, Normal      diclofenac (VOLTAREN) 75 mg EC tablet take 1 tablet by mouth twice a day, Normal      Diclofenac Sodium (PENNSAID) 2 % SOLN Place on the skin, Starting Fri 3/3/2017, Historical Med      gabapentin (NEURONTIN) 100 mg capsule Take by mouth, Starting Fri 9/2/2016, Historical Med      hydrochlorothiazide (MICROZIDE) 12 5 mg capsule Take 12 5 mg by mouth daily, Starting Tue 10/24/2017, Historical Med      HYDROcodone-acetaminophen (NORCO)  mg per tablet Take 1 tablet by mouth every 6 (six) hours as needed for moderate pain Max Daily Amount: 4 tablets, Starting Mon 1/7/2019, Normal      hylan (SYNVISC) injection Inject into the joint, Starting Tue 3/29/2016, Historical Med      indomethacin (INDOCIN) 25 mg capsule Take by mouth every 8 (eight) hours, Starting Tue 12/15/2015, Historical Med      lisinopril (ZESTRIL) 40 mg tablet take 1 tablet by mouth once daily, Normal      metFORMIN (GLUCOPHAGE) 500 mg tablet Take 1 tablet (500 mg total) by mouth 2 (two) times a day, Starting Wed 11/28/2018, Normal      mometasone-formoterol (DULERA) 200-5 MCG/ACT inhaler Inhale 2 puffs 2 (two) times a day, Starting Tue 11/6/2018, Normal      Multiple Vitamin (MULTI-VITAMIN DAILY PO) Take 1 tablet by mouth daily, Starting Wed 3/5/2014, Historical Med potassium chloride (K-DUR,KLOR-CON) 10 mEq tablet Take 1 tablet by mouth 2 (two) times a day, Starting Wed 8/16/2017, Print      predniSONE 20 mg tablet TAKE 1 TABLET BID X 5 DAYS,  THEN TAKE 1 TABLET QD FOR 5 DAYS, Print      promethazine-codeine (PHENERGAN WITH CODEINE) 6 25-10 mg/5 mL syrup Take 5 mL by mouth every 4 (four) hours as needed for cough, Starting Thu 1/17/2019, Normal      rOPINIRole (REQUIP) 0 5 mg tablet take 1 to 4 tablets by mouth at bedtime, Normal      tiotropium (SPIRIVA RESPIMAT) 2 5 MCG/ACT AERS inhaler Inhale 2 puffs daily, Starting Thu 8/23/2018, Normal           No discharge procedures on file      ED Provider  Electronically Signed by           Brenda Hernandez DO  01/21/19 5619

## 2019-01-23 ENCOUNTER — VBI (OUTPATIENT)
Dept: ADMINISTRATIVE | Facility: OTHER | Age: 54
End: 2019-01-23

## 2019-01-23 ENCOUNTER — TELEPHONE (OUTPATIENT)
Dept: FAMILY MEDICINE CLINIC | Facility: CLINIC | Age: 54
End: 2019-01-23

## 2019-01-23 DIAGNOSIS — E11.9 TYPE 2 DIABETES MELLITUS WITHOUT COMPLICATION, WITHOUT LONG-TERM CURRENT USE OF INSULIN (HCC): Primary | Chronic | ICD-10-CM

## 2019-01-23 RX ORDER — GLIPIZIDE 5 MG/1
5 TABLET ORAL
Qty: 60 TABLET | Refills: 2 | Status: SHIPPED | OUTPATIENT
Start: 2019-01-23 | End: 2019-04-15 | Stop reason: SDUPTHER

## 2019-01-23 NOTE — TELEPHONE ENCOUNTER
Ninfa Briscoe    ED Visit Information     Ed visit date: 01/21/2019  Diagnosis Description: Acute viral syndrome; Mild dehydration; Asthma with acute exacerbation  In Network? Yes [address]  Discharge status: Home  Discharged with meds ? No  Number of ED visits to date: 1  ED Severity:n/a     Outreach Information    Outreach successful: Yes 1  Date letter mailed:n/a  Date Finalized:1/23/2019    Care Coordination    Follow up appointment with pcp: no None scheduled  Transportation issues ? No    [employer] type:  7 Day Outreach  Emergent necessity warranted by diagnosis:  No  [facility] PCP:  Yes  Transportation:  Self Transport  01/23/2019 11:06 AM Phone (Dede Modi) Vicki Hassan (Self) [phone] (H)   Left Message - requesting a call back    Unable to reach patient regarding his recent ED visit on 01/21/2019 for Acute viral syndrome; Mild dehydration; Asthma with acute exacerbation  2nd attempt will be made on 1/24 01/23/2019 01:27 PM Phone (Incoming) [place] (Self) [phone] (H)   Call Complete    Personal communication with patient regarding recent ED visit on 1/22/2019 for Acute viral syndrome; Mild dehydration; Asthma with acute exacerbation  Nan Jamaal stated that he is concerned due to high blood sugars  He believes it is because of the [facility] he has been drinking due to mild dehydration  He has switched to Pedialyte  He has called his PCP for advise on blood sugars  He declined to schedule a follow up at this time  Dara Hinton does not meet OPCM criteria, denies any transportation issues and is aware of his nearest [place] [address] urgent care facility, education not given

## 2019-01-23 NOTE — TELEPHONE ENCOUNTER
Concerned with his sugars--was in ER Monday for viral infection--since than his sugars a running btwn 300-500, wants to know what he should do, currently taking met 500mg BID, suppose to go back to work tomm, still is not feeling well, & worried about sugars  Faby Neri

## 2019-01-23 NOTE — TELEPHONE ENCOUNTER
Pt aware---will get Rx tonight-    Ok to give work note to pt--per TW--pt will call when feeling better & needs note

## 2019-01-25 ENCOUNTER — OFFICE VISIT (OUTPATIENT)
Dept: FAMILY MEDICINE CLINIC | Facility: CLINIC | Age: 54
End: 2019-01-25
Payer: COMMERCIAL

## 2019-01-25 ENCOUNTER — HOSPITAL ENCOUNTER (OUTPATIENT)
Dept: RADIOLOGY | Facility: HOSPITAL | Age: 54
Discharge: HOME | End: 2019-01-25
Payer: COMMERCIAL

## 2019-01-25 VITALS
BODY MASS INDEX: 44.1 KG/M2 | HEIGHT: 71 IN | SYSTOLIC BLOOD PRESSURE: 132 MMHG | WEIGHT: 315 LBS | DIASTOLIC BLOOD PRESSURE: 80 MMHG

## 2019-01-25 DIAGNOSIS — R30.9 PAINFUL URINATION: Primary | ICD-10-CM

## 2019-01-25 DIAGNOSIS — R31.9 HEMATURIA, UNSPECIFIED TYPE: ICD-10-CM

## 2019-01-25 LAB
SL AMB  POCT GLUCOSE, UA: 1000
SL AMB LEUKOCYTE ESTERASE,UA: ABNORMAL
SL AMB POCT BILIRUBIN,UA: ABNORMAL
SL AMB POCT BLOOD,UA: ABNORMAL
SL AMB POCT KETONES,UA: 160
SL AMB POCT NITRITE,UA: ABNORMAL
SL AMB POCT PH,UA: 5
SL AMB POCT SPECIFIC GRAVITY,UA: 1
SL AMB POCT URINE PROTEIN: ABNORMAL
SL AMB POCT UROBILINOGEN: 0.2

## 2019-01-25 PROCEDURE — 81002 URINALYSIS NONAUTO W/O SCOPE: CPT | Performed by: NURSE PRACTITIONER

## 2019-01-25 PROCEDURE — 74018 RADEX ABDOMEN 1 VIEW: CPT

## 2019-01-25 PROCEDURE — 99214 OFFICE O/P EST MOD 30 MIN: CPT | Performed by: NURSE PRACTITIONER

## 2019-01-25 NOTE — PATIENT INSTRUCTIONS
Urine dip- large blood, no leukocytes     Urine culture sent  Have KUB done to r/o kidney stones  Will call with results  Drink fluids/keep hydrated as discussed  Call return with any problems or concerns

## 2019-01-25 NOTE — PROGRESS NOTES
[facility]        NAME: Kell Robbins is a 53 y o  male  : 1965    MRN: [id number]  DATE: 2019  TIME: 12:04 PM    Assessment and Plan   Painful urination [R30 9]  1  Painful urination  POCT urine dip   2  Hematuria, unspecified type  XR abdomen 1 view kub         Patient Instructions     Patient Instructions   Urine dip- large blood, no leukocytes  Urine culture sent  Have KUB done to r/o kidney stones  Will call with results  Drink fluids/keep hydrated as discussed  Call return with any problems or concerns          Chief Complaint     Chief Complaint   Patient presents with    Urinary Tract Infection     for 2 days---weakness as well     Diabetes     elevated sugars          History of Present Illness       C/o urinary burning, cramping in abdomen and left flank  Was seen in ER on  for acute viral syndrome and dehydration  Patient states still feeling weak  Review of Systems   Review of Systems   Constitutional: Negative  HENT: Negative  Eyes: Negative  Respiratory: Positive for cough and wheezing  Cardiovascular: Negative  Negative for chest pain  Gastrointestinal: Positive for abdominal pain  Negative for constipation, diarrhea and nausea  Endocrine: Negative  Genitourinary: Positive for dysuria and flank pain  Skin: Negative  Neurological: Negative  Psychiatric/Behavioral: Negative            Current Medications       Current Outpatient Prescriptions:     albuterol (VENTOLIN HFA) 90 mcg/act inhaler, Inhale 2 puffs every 6 (six) hours as needed for wheezing (Patient not taking: Reported on 2019 ), Disp: 18 g, Rfl: 3    allopurinol (ZYLOPRIM) 100 mg tablet, take 2 tablets by mouth once daily, Disp: 60 tablet, Rfl: 3    atorvastatin (LIPITOR) 20 mg tablet, Take 1 tablet (20 mg total) by mouth daily, Disp: 90 tablet, Rfl: 0    budesonide-formoterol (SYMBICORT) 160-4 5 mcg/act inhaler, Inhale 2 puffs 2 (two) times a day Rinse mouth after use , Disp: 1 Inhaler, Rfl: 10    diclofenac (VOLTAREN) 75 mg EC tablet, take 1 tablet by mouth twice a day (Patient not taking: Reported on 12/20/2018), Disp: 60 tablet, Rfl: 2    Diclofenac Sodium (PENNSAID) 2 % SOLN, Place on the skin, Disp: , Rfl:     gabapentin (NEURONTIN) 100 mg capsule, Take by mouth, Disp: , Rfl:     glipiZIDE (GLUCOTROL) 5 mg tablet, Take 1 tablet (5 mg total) by mouth 2 (two) times a day before meals, Disp: 60 tablet, Rfl: 2    hydrochlorothiazide (MICROZIDE) 12 5 mg capsule, Take 12 5 mg by mouth daily, Disp: , Rfl: 0    HYDROcodone-acetaminophen (NORCO)  mg per tablet, Take 1 tablet by mouth every 6 (six) hours as needed for moderate pain Max Daily Amount: 4 tablets, Disp: 90 tablet, Rfl: 0    hylan (SYNVISC) injection, Inject into the joint, Disp: , Rfl:     indomethacin (INDOCIN) 25 mg capsule, Take by mouth every 8 (eight) hours, Disp: , Rfl:     lisinopril (ZESTRIL) 40 mg tablet, take 1 tablet by mouth once daily, Disp: 90 tablet, Rfl: 0    metFORMIN (GLUCOPHAGE) 500 mg tablet, Take 1 tablet (500 mg total) by mouth 2 (two) times a day (Patient taking differently: Take 500 mg by mouth daily with breakfast  ), Disp: 180 tablet, Rfl: 0    mometasone-formoterol (DULERA) 200-5 MCG/ACT inhaler, Inhale 2 puffs 2 (two) times a day, Disp: 3 Inhaler, Rfl: 0    Multiple Vitamin (MULTI-VITAMIN DAILY PO), Take 1 tablet by mouth daily, Disp: , Rfl:     potassium chloride (K-DUR,KLOR-CON) 10 mEq tablet, Take 1 tablet by mouth 2 (two) times a day, Disp: 60 tablet, Rfl: 0    predniSONE 20 mg tablet, TAKE 1 TABLET BID X 5 DAYS, THEN TAKE 1 TABLET QD FOR 5 DAYS (Patient not taking: Reported on 12/20/2018 ), Disp: 15 tablet, Rfl: 0    promethazine-codeine (PHENERGAN WITH CODEINE) 6 25-10 mg/5 mL syrup, Take 5 mL by mouth every 4 (four) hours as needed for cough (Patient not taking: Reported on 1/21/2019 ), Disp: 240 mL, Rfl: 0    rOPINIRole (REQUIP) 0 5 mg tablet, take 1 to 4 tablets by mouth at bedtime (Patient not taking: Reported on 1/21/2019), Disp: 120 tablet, Rfl: 0    tiotropium (SPIRIVA RESPIMAT) 2 5 MCG/ACT AERS inhaler, Inhale 2 puffs daily, Disp: 1 Inhaler, Rfl: 10    Current Allergies     Allergies as of 01/25/2019    (No Known Allergies)            The following portions of the patient's history were reviewed and updated as appropriate: allergies, current medications, past family history, past medical history, past social history, past surgical history and problem list      Past Medical History:   Diagnosis Date    Asthma     Chronic pain     [facility] mellitus ([facility] [address] [facility]     Hyperlipidemia     Hypertension     Impaired fasting glucose     last assessed: 12/16/2015    Knee arthropathy     last assessed: 3/23/2016       Past Surgical History:   Procedure Laterality Date    KNEE ARTHROSCOPY Bilateral     KNEE SURGERY         Family History   Problem Relation Age of Onset    Coronary artery disease Father     Cancer Other         malignant neoplasm         Medications have been verified  Objective   /80   Ht 5' 10 5" (1 791 m)   Wt (!) 146 kg (321 lb)   BMI 45 41 kg/m²        Physical Exam     Physical Exam   Constitutional: He is oriented to person, place, and time  He appears well-developed and well-nourished  He has a sickly appearance  HENT:   Head: Normocephalic and atraumatic  Cardiovascular: Normal rate and regular rhythm  Pulmonary/Chest: Effort normal  No respiratory distress  He has wheezes  He exhibits no tenderness  Wheezing heard on expiration b/l upper lung fields   Abdominal: Soft  Bowel sounds are normal  He exhibits no distension  There is no tenderness  Neurological: He is alert and oriented to person, place, and time  He has normal reflexes  Skin: Skin is warm and dry  Psychiatric: He has a normal mood and affect   His behavior is normal  Judgment and thought content normal

## 2019-01-26 LAB
BACTERIA BLD CULT: NORMAL
BACTERIA BLD CULT: NORMAL

## 2019-01-27 LAB
APPEARANCE UR: CLEAR
BACTERIA UR CULT: NORMAL
BACTERIA URNS QL MICRO: NORMAL
BILIRUB UR QL STRIP: NEGATIVE
COLOR UR: YELLOW
EPI CELLS #/AREA URNS HPF: NORMAL /HPF
GLUCOSE UR QL: ABNORMAL
HGB UR QL STRIP: NEGATIVE
KETONES UR QL STRIP: ABNORMAL
LEUKOCYTE ESTERASE UR QL STRIP: NEGATIVE
Lab: NO GROWTH
MICRO URNS: ABNORMAL
MICRO URNS: ABNORMAL
MUCOUS THREADS URNS QL MICRO: PRESENT
NITRITE UR QL STRIP: NEGATIVE
PH UR STRIP: 5 [PH] (ref 5–7.5)
PROT UR QL STRIP: NEGATIVE
RBC #/AREA URNS HPF: NORMAL /HPF
SP GR UR: >=1.03 (ref 1–1.03)
UROBILINOGEN UR STRIP-ACNC: 0.2 EU/DL (ref 0.2–1)
WBC #/AREA URNS HPF: NORMAL /HPF

## 2019-01-28 ENCOUNTER — TELEPHONE (OUTPATIENT)
Dept: FAMILY MEDICINE CLINIC | Facility: CLINIC | Age: 54
End: 2019-01-28

## 2019-01-28 DIAGNOSIS — J40 BRONCHITIS: Primary | ICD-10-CM

## 2019-01-28 NOTE — TELEPHONE ENCOUNTER
C/O SUGARS -400, CRAMPS IN RIB CAGE, WEAK, URINE STILL BURNING  Review chart/labs/urine culture and advise

## 2019-01-28 NOTE — TELEPHONE ENCOUNTER
----- Message from [address] [place] [address],[address] sent at 1/28/2019  9:20 AM EST -----  Call patient with Urine culture result  Result is normal  Shows some glucose and ketones which is from his diabetes and is improved from the culture sent from the ER on 1/21   Thank you

## 2019-01-30 ENCOUNTER — TELEPHONE (OUTPATIENT)
Dept: FAMILY MEDICINE CLINIC | Facility: CLINIC | Age: 54
End: 2019-01-30

## 2019-01-30 ENCOUNTER — OFFICE VISIT (OUTPATIENT)
Dept: FAMILY MEDICINE CLINIC | Facility: CLINIC | Age: 54
End: 2019-01-30
Payer: COMMERCIAL

## 2019-01-30 VITALS
WEIGHT: 315 LBS | HEIGHT: 71 IN | DIASTOLIC BLOOD PRESSURE: 98 MMHG | HEART RATE: 120 BPM | SYSTOLIC BLOOD PRESSURE: 138 MMHG | RESPIRATION RATE: 18 BRPM | BODY MASS INDEX: 44.1 KG/M2

## 2019-01-30 DIAGNOSIS — Z79.4 TYPE 2 DIABETES MELLITUS WITH HYPERGLYCEMIA, WITH LONG-TERM CURRENT USE OF INSULIN (HCC): ICD-10-CM

## 2019-01-30 DIAGNOSIS — J40 BRONCHITIS: ICD-10-CM

## 2019-01-30 DIAGNOSIS — E11.9 TYPE 2 DIABETES MELLITUS WITHOUT COMPLICATION, WITH LONG-TERM CURRENT USE OF INSULIN (HCC): Primary | ICD-10-CM

## 2019-01-30 DIAGNOSIS — Z79.4 TYPE 2 DIABETES MELLITUS WITHOUT COMPLICATION, WITH LONG-TERM CURRENT USE OF INSULIN (HCC): Primary | ICD-10-CM

## 2019-01-30 DIAGNOSIS — J45.51 SEVERE PERSISTENT ASTHMA WITH ACUTE EXACERBATION: ICD-10-CM

## 2019-01-30 DIAGNOSIS — E11.65 TYPE 2 DIABETES MELLITUS WITH HYPERGLYCEMIA, WITH LONG-TERM CURRENT USE OF INSULIN (HCC): ICD-10-CM

## 2019-01-30 PROBLEM — J45.909 ASTHMA: Status: ACTIVE | Noted: 2019-01-30

## 2019-01-30 LAB — SL AMB POCT HEMOGLOBIN AIC: 10.5 (ref ?–6.5)

## 2019-01-30 PROCEDURE — 83036 HEMOGLOBIN GLYCOSYLATED A1C: CPT | Performed by: FAMILY MEDICINE

## 2019-01-30 PROCEDURE — 3046F HEMOGLOBIN A1C LEVEL >9.0%: CPT | Performed by: FAMILY MEDICINE

## 2019-01-30 PROCEDURE — 99214 OFFICE O/P EST MOD 30 MIN: CPT | Performed by: FAMILY MEDICINE

## 2019-01-30 RX ORDER — BUDESONIDE AND FORMOTEROL FUMARATE DIHYDRATE 160; 4.5 UG/1; UG/1
2 AEROSOL RESPIRATORY (INHALATION) 2 TIMES DAILY
Qty: 1 INHALER | Refills: 10 | Status: SHIPPED | OUTPATIENT
Start: 2019-01-30 | End: 2019-03-22 | Stop reason: SDUPTHER

## 2019-01-30 RX ORDER — PIOGLITAZONEHYDROCHLORIDE 30 MG/1
30 TABLET ORAL DAILY
Qty: 90 TABLET | Refills: 1 | Status: SHIPPED | OUTPATIENT
Start: 2019-01-30 | End: 2019-07-27 | Stop reason: SDUPTHER

## 2019-01-30 NOTE — TELEPHONE ENCOUNTER
----- Message from [address] [place] [address],[address] sent at 1/30/2019  8:03 AM EST -----  Call patient- Xray of abdomen normal  Thank you

## 2019-01-30 NOTE — PROGRESS NOTES
[facility]        NAME: Prem Desir is a 53 y o  male  : 1965    MRN: [id number]  DATE: 2019  TIME: 8:11 AM    Assessment and Plan   Type 2 diabetes mellitus without complication, with long-term current use of insulin ([facility]) [E11 9, Z79 4]  1  Type 2 diabetes mellitus without complication, with long-term current use of insulin ([facility])  POCT hemoglobin A1c   2  Type 2 diabetes mellitus with hyperglycemia, with long-term current use of insulin ([facility])  pioglitazone (ACTOS) 30 mg tablet    insulin glargine (LANTUS SOLOSTAR) 100 units/mL injection pen   3  Bronchitis  budesonide-formoterol (SYMBICORT) 160-4 5 mcg/act inhaler   4  Severe persistent asthma with acute exacerbation           Patient Instructions     Patient Instructions   25min face-face---A1c==10---start Lantus at 20unit--add actos---stop glip          Chief Complaint     Chief Complaint   Patient presents with    Follow-up     BLOOD SUGAR- 394 THIS AM         History of Present Illness       C/o asthma not controlled/gluc [address]        Review of Systems   Review of Systems   Constitutional: Positive for fatigue  HENT: Positive for sore throat  Respiratory: Positive for shortness of breath and wheezing  Genitourinary: Positive for frequency           Current Medications       Current Outpatient Prescriptions:     albuterol (VENTOLIN HFA) 90 mcg/act inhaler, Inhale 2 puffs every 6 (six) hours as needed for wheezing (Patient not taking: Reported on 2019 ), Disp: 18 g, Rfl: 3    allopurinol (ZYLOPRIM) 100 mg tablet, take 2 tablets by mouth once daily, Disp: 60 tablet, Rfl: 3    atorvastatin (LIPITOR) 20 mg tablet, Take 1 tablet (20 mg total) by mouth daily, Disp: 90 tablet, Rfl: 0    budesonide-formoterol (SYMBICORT) 160-4 5 mcg/act inhaler, Inhale 2 puffs 2 (two) times a day Rinse mouth after use , Disp: 1 Inhaler, Rfl: 10    diclofenac (VOLTAREN) 75 mg EC tablet, take 1 tablet by mouth twice a day (Patient not taking: Reported on 12/20/2018), Disp: 60 tablet, Rfl: 2    Diclofenac Sodium (PENNSAID) 2 % SOLN, Place on the skin, Disp: , Rfl:     gabapentin (NEURONTIN) 100 mg capsule, Take by mouth, Disp: , Rfl:     glipiZIDE (GLUCOTROL) 5 mg tablet, Take 1 tablet (5 mg total) by mouth 2 (two) times a day before meals, Disp: 60 tablet, Rfl: 2    hydrochlorothiazide (MICROZIDE) 12 5 mg capsule, Take 12 5 mg by mouth daily, Disp: , Rfl: 0    HYDROcodone-acetaminophen (NORCO)  mg per tablet, Take 1 tablet by mouth every 6 (six) hours as needed for moderate pain Max Daily Amount: 4 tablets, Disp: 90 tablet, Rfl: 0    hylan (SYNVISC) injection, Inject into the joint, Disp: , Rfl:     indomethacin (INDOCIN) 25 mg capsule, Take by mouth every 8 (eight) hours, Disp: , Rfl:     insulin glargine (LANTUS SOLOSTAR) 100 units/mL injection pen, Inject 50 Units under the skin daily, Disp: 5 pen, Rfl: 1    lisinopril (ZESTRIL) 40 mg tablet, take 1 tablet by mouth once daily, Disp: 90 tablet, Rfl: 0    metFORMIN (GLUCOPHAGE) 500 mg tablet, Take 1 tablet (500 mg total) by mouth 2 (two) times a day (Patient taking differently: Take 500 mg by mouth daily with breakfast  ), Disp: 180 tablet, Rfl: 0    Multiple Vitamin (MULTI-VITAMIN DAILY PO), Take 1 tablet by mouth daily, Disp: , Rfl:     pioglitazone (ACTOS) 30 mg tablet, Take 1 tablet (30 mg total) by mouth daily, Disp: 90 tablet, Rfl: 1    potassium chloride (K-DUR,KLOR-CON) 10 mEq tablet, Take 1 tablet by mouth 2 (two) times a day, Disp: 60 tablet, Rfl: 0    predniSONE 20 mg tablet, TAKE 1 TABLET BID X 5 DAYS, THEN TAKE 1 TABLET QD FOR 5 DAYS (Patient not taking: Reported on 12/20/2018 ), Disp: 15 tablet, Rfl: 0    promethazine-codeine (PHENERGAN WITH CODEINE) 6 25-10 mg/5 mL syrup, Take 5 mL by mouth every 4 (four) hours as needed for cough (Patient not taking: Reported on 1/21/2019 ), Disp: 240 mL, Rfl: 0    rOPINIRole (REQUIP) 0 5 mg tablet, take 1 to 4 tablets by mouth at bedtime (Patient not taking: Reported on 1/21/2019), Disp: 120 tablet, Rfl: 0    umeclidinium bromide (INCRUSE ELLIPTA) 62 5 mcg/inh AEPB inhaler, Inhale 1 puff daily, Disp: 1 Inhaler, Rfl: 5    Current Allergies     Allergies as of 01/30/2019    (No Known Allergies)            The following portions of the patient's history were reviewed and updated as appropriate: allergies, current medications, past family history, past medical history, past social history, past surgical history and problem list      Past Medical History:   Diagnosis Date    Asthma     Chronic pain     Diabetes mellitus ([facility] [place] [address] )     Hyperlipidemia     Hypertension     Impaired fasting glucose     last assessed: 12/16/2015    Knee arthropathy     last assessed: 3/23/2016       Past Surgical History:   Procedure Laterality Date    KNEE ARTHROSCOPY Bilateral     KNEE SURGERY         Family History   Problem Relation Age of Onset    Coronary artery disease Father     Cancer Other         malignant neoplasm         Medications have been verified  Objective   /98   Pulse (!) 120   Resp 18   Ht 5' 10 5" (1 791 m)   Wt (!) 146 kg (321 lb)   BMI 45 41 kg/m²        Physical Exam     Physical Exam   Constitutional: He appears well-developed and well-nourished  No distress  HENT:   Right Ear: Tympanic membrane, external ear and ear canal normal  Tympanic membrane is not injected  Left Ear: Tympanic membrane, external ear and ear canal normal  Tympanic membrane is not injected  Nose: Nose normal    Mouth/Throat: Uvula is midline, oropharynx is clear and moist and mucous membranes are normal    Eyes: Pupils are equal, round, and reactive to light  Conjunctivae are normal    Neck: Normal range of motion  Neck supple  No thyromegaly present  Cardiovascular: Normal rate, regular rhythm and normal heart sounds  No murmur heard  Pulmonary/Chest: Effort normal  No respiratory distress  He has wheezes  Lymphadenopathy:     He has no cervical adenopathy  Skin: He is not diaphoretic

## 2019-02-01 DIAGNOSIS — E78.2 MIXED HYPERLIPIDEMIA: ICD-10-CM

## 2019-02-01 RX ORDER — ATORVASTATIN CALCIUM 20 MG/1
TABLET, FILM COATED ORAL
Qty: 90 TABLET | Refills: 0 | Status: SHIPPED | OUTPATIENT
Start: 2019-02-01 | End: 2019-04-30 | Stop reason: SDUPTHER

## 2019-02-19 DIAGNOSIS — G25.81 RESTLESS LEG: ICD-10-CM

## 2019-02-19 RX ORDER — ROPINIROLE 0.5 MG/1
TABLET, FILM COATED ORAL
Qty: 120 TABLET | Refills: 0 | Status: SHIPPED | OUTPATIENT
Start: 2019-02-19 | End: 2019-03-16 | Stop reason: SDUPTHER

## 2019-02-20 DIAGNOSIS — E11.9 TYPE 2 DIABETES MELLITUS WITHOUT COMPLICATION, WITHOUT LONG-TERM CURRENT USE OF INSULIN (HCC): ICD-10-CM

## 2019-03-07 DIAGNOSIS — J40 BRONCHITIS: ICD-10-CM

## 2019-03-08 ENCOUNTER — OFFICE VISIT (OUTPATIENT)
Dept: FAMILY MEDICINE CLINIC | Facility: CLINIC | Age: 54
End: 2019-03-08
Payer: COMMERCIAL

## 2019-03-08 VITALS
DIASTOLIC BLOOD PRESSURE: 90 MMHG | WEIGHT: 315 LBS | HEART RATE: 97 BPM | HEIGHT: 71 IN | BODY MASS INDEX: 44.1 KG/M2 | OXYGEN SATURATION: 92 % | SYSTOLIC BLOOD PRESSURE: 150 MMHG

## 2019-03-08 DIAGNOSIS — J45.901 MODERATE ASTHMA WITH EXACERBATION, UNSPECIFIED WHETHER PERSISTENT: Primary | ICD-10-CM

## 2019-03-08 DIAGNOSIS — M25.472 ANKLE SWELLING, LEFT: ICD-10-CM

## 2019-03-08 DIAGNOSIS — J40 BRONCHITIS: ICD-10-CM

## 2019-03-08 PROCEDURE — 99214 OFFICE O/P EST MOD 30 MIN: CPT | Performed by: NURSE PRACTITIONER

## 2019-03-08 PROCEDURE — 3008F BODY MASS INDEX DOCD: CPT | Performed by: NURSE PRACTITIONER

## 2019-03-08 PROCEDURE — 1036F TOBACCO NON-USER: CPT | Performed by: NURSE PRACTITIONER

## 2019-03-08 RX ORDER — PREDNISONE 20 MG/1
TABLET ORAL
Qty: 15 TABLET | Refills: 0 | Status: SHIPPED | OUTPATIENT
Start: 2019-03-08 | End: 2019-03-22 | Stop reason: ALTCHOICE

## 2019-03-08 RX ORDER — ALBUTEROL SULFATE 90 UG/1
2 AEROSOL, METERED RESPIRATORY (INHALATION) EVERY 6 HOURS PRN
Qty: 18 G | Refills: 3 | Status: SHIPPED | OUTPATIENT
Start: 2019-03-08 | End: 2019-03-08

## 2019-03-08 RX ORDER — PROMETHAZINE HYDROCHLORIDE AND CODEINE PHOSPHATE 6.25; 1 MG/5ML; MG/5ML
5 SYRUP ORAL EVERY 4 HOURS PRN
Qty: 240 ML | Refills: 0 | Status: SHIPPED | OUTPATIENT
Start: 2019-03-08 | End: 2019-03-12 | Stop reason: SDUPTHER

## 2019-03-08 RX ORDER — ALBUTEROL SULFATE 90 UG/1
2 AEROSOL, METERED RESPIRATORY (INHALATION) EVERY 6 HOURS PRN
Qty: 18 G | Refills: 3 | Status: SHIPPED | OUTPATIENT
Start: 2019-03-08 | End: 2019-11-09 | Stop reason: SDUPTHER

## 2019-03-08 RX ORDER — PREDNISONE 20 MG/1
TABLET ORAL
Qty: 15 TABLET | Refills: 0 | Status: SHIPPED | OUTPATIENT
Start: 2019-03-08 | End: 2019-03-08

## 2019-03-08 NOTE — PATIENT INSTRUCTIONS
Prednisone as directed  Promethazine cough medication as directed    Continue inhalers as directed  Elevate legs/reduce salt in diet for ankle swelling  If swelling in ankle does not improve call or return  Call/return with any problems or concerns

## 2019-03-08 NOTE — PROGRESS NOTES
[facility]        NAME: Margarito Cabrales is a 53 y o  male  : 1965    MRN: [id number]  DATE: 2019  TIME: 4:09 PM    Assessment and Plan   Moderate asthma with exacerbation, unspecified whether persistent [J45 901]  1  Moderate asthma with exacerbation, unspecified whether persistent  predniSONE 20 mg tablet   2  Bronchitis  promethazine-codeine (PHENERGAN WITH CODEINE) 6 25-10 mg/5 mL syrup    albuterol (VENTOLIN HFA) 90 mcg/act inhaler   3  Ankle swelling, left           Patient Instructions     Patient Instructions   Prednisone as directed  Promethazine cough medication as directed  Continue inhalers as directed  Elevate legs/reduce salt in diet for ankle swelling  If swelling in ankle does not improve call or return  Call/return with any problems or concerns          Chief Complaint     Chief Complaint   Patient presents with    Asthma     x1week    Foot Swelling         History of Present Illness       Increased SOB, with wheezing for 5 days  C/o swelling to left ankle for a few days  Has HX of asthma is using Inhalers with some relief  Was seen recently for bronchitis still having persistent cough and congestion  Requesting refill on cough medication  Review of Systems   Review of Systems   Constitutional: Negative  Negative for activity change, diaphoresis, fatigue and fever  HENT: Positive for congestion and postnasal drip  Negative for facial swelling, hearing loss, rhinorrhea, sinus pressure, sinus pain, sneezing, sore throat and voice change  Eyes: Negative for discharge and visual disturbance  Respiratory: Positive for cough, chest tightness, shortness of breath and wheezing  Negative for choking and stridor  Cardiovascular: Positive for leg swelling (Left ankle)  Negative for chest pain and palpitations  Gastrointestinal: Negative  Negative for abdominal distention, abdominal pain, constipation, diarrhea, nausea and vomiting     Endocrine: Negative for polydipsia, polyphagia and polyuria  Genitourinary: Negative for difficulty urinating, dysuria, frequency and urgency  Musculoskeletal: Negative for arthralgias, back pain, gait problem, joint swelling, myalgias, neck pain and neck stiffness  Skin: Negative for color change, rash and wound  Neurological: Negative for dizziness, syncope, speech difficulty, weakness, light-headedness and headaches  Hematological: Negative for adenopathy  Does not bruise/bleed easily  Psychiatric/Behavioral: Negative  Negative for agitation, behavioral problems, confusion, hallucinations, sleep disturbance and suicidal ideas  The patient is not nervous/anxious            Current Medications       Current Outpatient Medications:     albuterol (VENTOLIN HFA) 90 mcg/act inhaler, Inhale 2 puffs every 6 (six) hours as needed for wheezing, Disp: 18 g, Rfl: 3    allopurinol (ZYLOPRIM) 100 mg tablet, take 2 tablets by mouth once daily, Disp: 60 tablet, Rfl: 3    atorvastatin (LIPITOR) 20 mg tablet, TAKE 1 TABLET BY MOUTH EVERY DAY, Disp: 90 tablet, Rfl: 0    budesonide-formoterol (SYMBICORT) 160-4 5 mcg/act inhaler, Inhale 2 puffs 2 (two) times a day Rinse mouth after use , Disp: 1 Inhaler, Rfl: 10    diclofenac (VOLTAREN) 75 mg EC tablet, take 1 tablet by mouth twice a day (Patient not taking: Reported on 12/20/2018), Disp: 60 tablet, Rfl: 2    Diclofenac Sodium (PENNSAID) 2 % SOLN, Place on the skin, Disp: , Rfl:     gabapentin (NEURONTIN) 100 mg capsule, Take by mouth, Disp: , Rfl:     glipiZIDE (GLUCOTROL) 5 mg tablet, Take 1 tablet (5 mg total) by mouth 2 (two) times a day before meals, Disp: 60 tablet, Rfl: 2    hydrochlorothiazide (MICROZIDE) 12 5 mg capsule, Take 12 5 mg by mouth daily, Disp: , Rfl: 0    HYDROcodone-acetaminophen (NORCO)  mg per tablet, Take 1 tablet by mouth every 6 (six) hours as needed for moderate pain Max Daily Amount: 4 tablets, Disp: 90 tablet, Rfl: 0    hylan (SYNVISC) injection, Inject into the joint, Disp: , Rfl:     indomethacin (INDOCIN) 25 mg capsule, Take by mouth every 8 (eight) hours, Disp: , Rfl:     insulin degludec (TRESIBA FLEXTOUCH) 100 units/mL injection pen, Inject 50 Units under the skin daily, Disp: 5 pen, Rfl: 1    insulin glargine (LANTUS SOLOSTAR) 100 units/mL injection pen, Inject 50 Units under the skin daily, Disp: 5 pen, Rfl: 1    lisinopril (ZESTRIL) 40 mg tablet, take 1 tablet by mouth once daily, Disp: 90 tablet, Rfl: 0    metFORMIN (GLUCOPHAGE) 500 mg tablet, Take 1 tablet (500 mg total) by mouth daily with breakfast, Disp: 90 tablet, Rfl: 0    Multiple Vitamin (MULTI-VITAMIN DAILY PO), Take 1 tablet by mouth daily, Disp: , Rfl:     pioglitazone (ACTOS) 30 mg tablet, Take 1 tablet (30 mg total) by mouth daily, Disp: 90 tablet, Rfl: 1    potassium chloride (K-DUR,KLOR-CON) 10 mEq tablet, Take 1 tablet by mouth 2 (two) times a day, Disp: 60 tablet, Rfl: 0    predniSONE 20 mg tablet, TAKE 1 TABLET BID X 5 DAYS, THEN TAKE 1 TABLET QD FOR 5 DAYS, Disp: 15 tablet, Rfl: 0    promethazine-codeine (PHENERGAN WITH CODEINE) 6 25-10 mg/5 mL syrup, Take 5 mL by mouth every 4 (four) hours as needed for cough, Disp: 240 mL, Rfl: 0    rOPINIRole (REQUIP) 0 5 mg tablet, Take 1-4 tablets at bedtime as needed, Disp: 120 tablet, Rfl: 0    umeclidinium bromide (INCRUSE ELLIPTA) 62 5 mcg/inh AEPB inhaler, Inhale 1 puff daily, Disp: 1 Inhaler, Rfl: 5    Current Allergies     Allergies as of 03/08/2019    (No Known Allergies)            The following portions of the patient's history were reviewed and updated as appropriate: allergies, current medications, past family history, past medical history, past social history, past surgical history and problem list      Past Medical History:   Diagnosis Date    Asthma     Chronic pain     [facility] ([facility] [address] [facility]     Hyperlipidemia     Hypertension     Impaired fasting glucose     last assessed: 12/16/2015    Knee arthropathy     last assessed: 3/23/2016       Past Surgical History:   Procedure Laterality Date    KNEE ARTHROSCOPY Bilateral     KNEE SURGERY         Family History   Problem Relation Age of Onset    Coronary artery disease Father     Cancer Other         malignant neoplasm         Medications have been verified  Objective   /90   Pulse 97   Ht 5' 10 5" (1 791 m)   Wt (!) 151 kg (333 lb)   SpO2 92%   BMI 47 11 kg/m²        Physical Exam     Physical Exam   Constitutional: He is oriented to person, place, and time  He appears well-developed and well-nourished  HENT:   Head: Normocephalic  Right Ear: External ear normal    Left Ear: External ear normal    Mouth/Throat: Oropharynx is clear and moist    Neck: Normal range of motion  Cardiovascular: Normal rate, regular rhythm, normal heart sounds and intact distal pulses  Pulmonary/Chest: No respiratory distress  He has wheezes in the right upper field, the right middle field, the right lower field, the left upper field and the left middle field  Musculoskeletal: He exhibits edema (Mild edema to left ankle  +1 edema  no tenderness to ankle or calf)  Neurological: He is alert and oriented to person, place, and time  Skin: Skin is warm  Psychiatric: He has a normal mood and affect   His behavior is normal  Judgment and thought content normal

## 2019-03-12 DIAGNOSIS — J40 BRONCHITIS: ICD-10-CM

## 2019-03-12 RX ORDER — PROMETHAZINE HYDROCHLORIDE AND CODEINE PHOSPHATE 6.25; 1 MG/5ML; MG/5ML
5 SYRUP ORAL EVERY 4 HOURS PRN
Qty: 240 ML | Refills: 0 | Status: SHIPPED | OUTPATIENT
Start: 2019-03-12 | End: 2019-03-22 | Stop reason: ALTCHOICE

## 2019-03-16 DIAGNOSIS — G25.81 RESTLESS LEG: ICD-10-CM

## 2019-03-17 RX ORDER — ROPINIROLE 0.5 MG/1
TABLET, FILM COATED ORAL
Qty: 120 TABLET | Refills: 0 | Status: SHIPPED | OUTPATIENT
Start: 2019-03-17 | End: 2019-08-16 | Stop reason: SDUPTHER

## 2019-03-19 DIAGNOSIS — G25.81 RESTLESS LEG: ICD-10-CM

## 2019-03-19 RX ORDER — ROPINIROLE 0.5 MG/1
TABLET, FILM COATED ORAL
Qty: 120 TABLET | Refills: 0 | Status: CANCELLED | OUTPATIENT
Start: 2019-03-19

## 2019-03-22 ENCOUNTER — OFFICE VISIT (OUTPATIENT)
Dept: PULMONOLOGY | Facility: CLINIC | Age: 54
End: 2019-03-22
Payer: COMMERCIAL

## 2019-03-22 VITALS
TEMPERATURE: 98.7 F | BODY MASS INDEX: 45.1 KG/M2 | DIASTOLIC BLOOD PRESSURE: 90 MMHG | WEIGHT: 315 LBS | OXYGEN SATURATION: 96 % | SYSTOLIC BLOOD PRESSURE: 152 MMHG | HEIGHT: 70 IN | HEART RATE: 96 BPM

## 2019-03-22 DIAGNOSIS — J40 BRONCHITIS: ICD-10-CM

## 2019-03-22 DIAGNOSIS — G47.33 OBSTRUCTIVE SLEEP APNEA ON CPAP: ICD-10-CM

## 2019-03-22 DIAGNOSIS — J45.51 SEVERE PERSISTENT ALLERGIC ASTHMA WITH ACUTE EXACERBATION: Primary | ICD-10-CM

## 2019-03-22 DIAGNOSIS — J45.51 SEVERE PERSISTENT ASTHMA WITH EXACERBATION: Primary | ICD-10-CM

## 2019-03-22 DIAGNOSIS — Z99.89 OBSTRUCTIVE SLEEP APNEA ON CPAP: ICD-10-CM

## 2019-03-22 PROCEDURE — 94010 BREATHING CAPACITY TEST: CPT | Performed by: INTERNAL MEDICINE

## 2019-03-22 PROCEDURE — 99244 OFF/OP CNSLTJ NEW/EST MOD 40: CPT | Performed by: INTERNAL MEDICINE

## 2019-03-22 RX ORDER — ALBUTEROL SULFATE 2.5 MG/3ML
2.5 SOLUTION RESPIRATORY (INHALATION) EVERY 6 HOURS PRN
Qty: 90 VIAL | Refills: 5 | Status: SHIPPED | OUTPATIENT
Start: 2019-03-22 | End: 2019-10-30

## 2019-03-22 RX ORDER — PREDNISONE 10 MG/1
TABLET ORAL
Qty: 20 TABLET | Refills: 0 | Status: SHIPPED | OUTPATIENT
Start: 2019-03-22 | End: 2019-05-03 | Stop reason: ALTCHOICE

## 2019-03-22 RX ORDER — BUDESONIDE AND FORMOTEROL FUMARATE DIHYDRATE 160; 4.5 UG/1; UG/1
2 AEROSOL RESPIRATORY (INHALATION) 2 TIMES DAILY
Qty: 1 INHALER | Refills: 5 | Status: SHIPPED | OUTPATIENT
Start: 2019-03-22 | End: 2020-04-06

## 2019-03-22 NOTE — ASSESSMENT & PLAN NOTE
Over the past 4 months, the patient has had severe asthma symptoms that have not resolved despite multiple courses of antibiotics and prednisone  He currently is taking Incruse, and albuterol and again is having worsening symptoms  When reviewing possible triggers/etiologies of acute worsening, his girlfriend has moved in with in the past 6 months and has brought a bird with her  He also has been using new laundry detergent  Our 1st step is to get this under control again, I have started him on Symbicort 2 puffs twice daily, Ventolin p r n  And albuterol nebulizer with albuterol to use 3 times daily  If his symptoms do not improve over the next couple days or they worsen, he will start a prednisone prescription which I provided him today  With concern of the bird being the etiology, I have asked him to try and avoid the bird as much as possible  He will also change back to his routine laundry detergent  He would like to hold off on allergy testing at this time  We will do further workup/treatments at our next visit if he has not been able to get his symptoms under control

## 2019-03-22 NOTE — PROGRESS NOTES
Assessment:    Severe persistent allergic asthma with acute exacerbation  Over the past 4 months, the patient has had severe asthma symptoms that have not resolved despite multiple courses of antibiotics and prednisone  He currently is taking Incruse, and albuterol and again is having worsening symptoms  When reviewing possible triggers/etiologies of acute worsening, his girlfriend has moved in with in the past 6 months and has brought a bird with her  He also has been using new laundry detergent  Our 1st step is to get this under control again, I have started him on Symbicort 2 puffs twice daily, Ventolin p r n  And albuterol nebulizer with albuterol to use 3 times daily  If his symptoms do not improve over the next couple days or they worsen, he will start a prednisone prescription which I provided him today  With concern of the bird being the etiology, I have asked him to try and avoid the bird as much as possible  He will also change back to his routine laundry detergent  He would like to hold off on allergy testing at this time  We will do further workup/treatments at our next visit if he has not been able to get his symptoms under control  Obstructive sleep apnea on CPAP  He tells me that he has stopped using his CPAP machine because he lost 60 lb and is no longer snoring  He has not had a repeat sleep study  Plan:    Diagnoses and all orders for this visit:    Severe persistent allergic asthma with acute exacerbation  -     POCT spirometry  -     Nebulizer  -     Nebulizer Supplies  -     albuterol (2 5 mg/3 mL) 0 083 % nebulizer solution; Take 1 vial (2 5 mg total) by nebulization every 6 (six) hours as needed for wheezing or shortness of breath  -     predniSONE 10 mg tablet;  Take 4 tabs for 3 days then 3 tabs daily for 3 days then 2 tabs daily for 3 days then 1 tab daily for 3 days then stop    Obstructive sleep apnea on CPAP    Bronchitis  -     budesonide-formoterol (SYMBICORT) 160-4 5 mcg/act inhaler; Inhale 2 puffs 2 (two) times a day Rinse mouth after use  Follow-up:  6-8 weeks      HPI:  He has had asthma his entire life  Up until Dec he would use the albuterol rescue inhaler periodically  No exacerbations prior to Dec    In Dec 2018 - He had a change in insurance and had to switch to proair  He had injection in his right hip and seemed to go downhill from there    He developed bronchitis and needed antibiotics and two courses of prednisone  He was out of work for 2 weeks    He was short of breath, with tightness and wheezing and cough  3 weeks ago he seemed to get slightly better - unfortunately he is starting to feel congested again  Phlegm is white/yellow    He is not using Symbicort because his insurance   He is now on Incruse daily and using ventolin 3-4 times per day    He has not moved - new laundry detergent  He has a dog that is 16years old  His girlfriend moved in 6-7 months ago with a [employer]     - heavy dust with metals  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    Review of Systems   Constitutional: Positive for activity change  Negative for diaphoresis, fatigue, fever and unexpected weight change  HENT: Positive for congestion and sore throat  Negative for hearing loss, postnasal drip and voice change  Eyes: Negative for visual disturbance  Respiratory: Positive for cough, chest tightness, shortness of breath and wheezing  Negative for apnea  Cardiovascular: Positive for leg swelling  Negative for chest pain and palpitations  Gastrointestinal: Negative for abdominal distention, abdominal pain, blood in stool, constipation, diarrhea, nausea and vomiting  Endocrine: Negative for polyuria  Genitourinary: Negative for dysuria  Musculoskeletal: Positive for arthralgias and back pain  Skin: Negative for rash  Allergic/Immunologic: Negative for environmental allergies     Neurological: Negative for dizziness, tremors and numbness  Hematological: Does not bruise/bleed easily  Psychiatric/Behavioral: Negative for sleep disturbance  The patient is not nervous/anxious  Historical Information   Past Medical History:   Diagnosis Date    Asthma     Chronic pain     Diabetes mellitus ([address] )     Hyperlipidemia     Hypertension     Impaired fasting glucose     last assessed: 12/16/2015    Knee arthropathy     last assessed: 3/23/2016     Past Surgical History:   Procedure Laterality Date    KNEE ARTHROSCOPY Bilateral     KNEE SURGERY       Social History   Social History     Substance and Sexual Activity   Alcohol Use Yes    Comment: 2 light beers daily     Social History     Tobacco Use   Smoking Status Never Smoker   Smokeless Tobacco Never Used       Occupational history:  Heavy     Family History:   Family History   Problem Relation Age of Onset    Coronary artery disease Father     Cancer Other         malignant neoplasm    Asthma Brother     Asthma Daughter        Medications: The patient's active and prehospital medications were reviewed  VITALS:  Vitals:    03/22/19 1455   BP: 152/90   BP Location: Left arm   Patient Position: Sitting   Cuff Size: Large   Pulse: 96   Temp: 98 7 °F (37 1 °C)   TempSrc: Tympanic   SpO2: 96%   Weight: (!) 147 kg (324 lb 6 4 oz)   Height: 5' 10" (1 778 m)       Physical Exam   Constitutional: He is oriented to person, place, and time  He appears well-developed and well-nourished  No distress  HENT:   Head: Normocephalic and atraumatic  Eyes: Pupils are equal, round, and reactive to light  Right eye exhibits no discharge  No scleral icterus  Neck: Normal range of motion  Neck supple  No JVD present  Cardiovascular: Normal rate, regular rhythm and normal heart sounds  Exam reveals no gallop and no friction rub  No murmur heard  Pulmonary/Chest: Effort normal  No stridor  No respiratory distress  He has wheezes  He has no rales     Decreased BS bilateral   Abdominal: Soft  Bowel sounds are normal  There is no tenderness  Musculoskeletal: Normal range of motion  He exhibits no edema or deformity  Neurological: He is alert and oriented to person, place, and time  Skin: Skin is warm and dry  No rash noted  He is not diaphoretic  Psychiatric: He has a normal mood and affect  His behavior is normal    Vitals reviewed  PFT results:  Spirometry:  FEV1/FVC Ratio is 69  FEV1 is 2 77L which is 73% predicted  FVC is 4 00L which is 81% predicted       IMPRESSION:  Mild obstruction      Diagnostic Data:  CBC:   Lab Results   Component Value Date    WBC 9 36 01/21/2019    HGB 15 8 01/21/2019    HCT 47 1 01/21/2019    MCV 88 01/21/2019     01/21/2019         CMP:   Lab Results   Component Value Date     01/17/2018    K 3 5 01/21/2019    CL 93 (L) 01/21/2019    CO2 26 01/21/2019    BUN 11 01/21/2019    CREATININE 0 93 01/21/2019    GLUCOSE 99 01/17/2018    CALCIUM 8 9 01/21/2019    AST 13 01/21/2019    ALT 30 01/21/2019    ALKPHOS 116 01/21/2019    PROT 6 9 01/17/2018    BILITOT 0 5 01/17/2018    EGFR 92 01/21/2019       Imaging studies:  I have personally reviewed pertinent films in PACS  1/21/19 CXR without focal infiltrate    Debbie Rincon DO

## 2019-03-22 NOTE — PATIENT INSTRUCTIONS
-Stop Incruse  -start Symbicort 2 puffs twice daily-rinse mouth out after use  -start albuterol nebulizer 3 times daily for now, as symptoms improve, can back off to as needed only    -switch laundry detergent back to tide  -avoid [place]

## 2019-03-22 NOTE — ASSESSMENT & PLAN NOTE
He tells me that he has stopped using his CPAP machine because he lost 60 lb and is no longer snoring  He has not had a repeat sleep study

## 2019-03-26 DIAGNOSIS — E11.9 TYPE 2 DIABETES MELLITUS WITHOUT COMPLICATION, WITH LONG-TERM CURRENT USE OF INSULIN (HCC): ICD-10-CM

## 2019-03-26 DIAGNOSIS — Z79.4 TYPE 2 DIABETES MELLITUS WITHOUT COMPLICATION, WITH LONG-TERM CURRENT USE OF INSULIN (HCC): ICD-10-CM

## 2019-03-26 RX ORDER — INSULIN DEGLUDEC INJECTION 100 U/ML
INJECTION, SOLUTION SUBCUTANEOUS
Qty: 5 PEN | Refills: 1 | Status: SHIPPED | OUTPATIENT
Start: 2019-03-26 | End: 2019-08-16

## 2019-04-12 DIAGNOSIS — M10.9 GOUT, UNSPECIFIED CAUSE, UNSPECIFIED CHRONICITY, UNSPECIFIED SITE: ICD-10-CM

## 2019-04-15 DIAGNOSIS — E11.9 TYPE 2 DIABETES MELLITUS WITHOUT COMPLICATION, WITHOUT LONG-TERM CURRENT USE OF INSULIN (HCC): Chronic | ICD-10-CM

## 2019-04-15 RX ORDER — ALLOPURINOL 100 MG/1
TABLET ORAL
Qty: 60 TABLET | Refills: 2 | Status: SHIPPED | OUTPATIENT
Start: 2019-04-15 | End: 2019-07-26 | Stop reason: SDUPTHER

## 2019-04-15 RX ORDER — GLIPIZIDE 5 MG/1
5 TABLET ORAL
Qty: 60 TABLET | Refills: 2 | Status: SHIPPED | OUTPATIENT
Start: 2019-04-15 | End: 2020-02-21

## 2019-04-30 DIAGNOSIS — E78.2 MIXED HYPERLIPIDEMIA: ICD-10-CM

## 2019-04-30 RX ORDER — ATORVASTATIN CALCIUM 20 MG/1
TABLET, FILM COATED ORAL
Qty: 90 TABLET | Refills: 0 | Status: SHIPPED | OUTPATIENT
Start: 2019-04-30 | End: 2019-07-28 | Stop reason: SDUPTHER

## 2019-05-02 DIAGNOSIS — E03.9 ACQUIRED HYPOTHYROIDISM: ICD-10-CM

## 2019-05-02 RX ORDER — LISINOPRIL 40 MG/1
40 TABLET ORAL DAILY
Qty: 90 TABLET | Refills: 1 | Status: SHIPPED | OUTPATIENT
Start: 2019-05-02 | End: 2019-10-29 | Stop reason: SDUPTHER

## 2019-05-03 ENCOUNTER — OFFICE VISIT (OUTPATIENT)
Dept: PULMONOLOGY | Facility: HOSPITAL | Age: 54
End: 2019-05-03
Payer: COMMERCIAL

## 2019-05-03 VITALS
SYSTOLIC BLOOD PRESSURE: 140 MMHG | OXYGEN SATURATION: 97 % | WEIGHT: 315 LBS | HEIGHT: 70 IN | DIASTOLIC BLOOD PRESSURE: 100 MMHG | HEART RATE: 83 BPM | TEMPERATURE: 79 F | BODY MASS INDEX: 45.1 KG/M2

## 2019-05-03 DIAGNOSIS — J45.50 SEVERE PERSISTENT EXTRINSIC ASTHMA WITHOUT COMPLICATION: Primary | ICD-10-CM

## 2019-05-03 PROCEDURE — 99214 OFFICE O/P EST MOD 30 MIN: CPT | Performed by: PHYSICIAN ASSISTANT

## 2019-05-03 RX ORDER — MONTELUKAST SODIUM 10 MG/1
10 TABLET ORAL
Qty: 90 EACH | Refills: 11 | Status: SHIPPED | OUTPATIENT
Start: 2019-05-03 | End: 2020-07-07 | Stop reason: SDUPTHER

## 2019-05-03 RX ORDER — CETIRIZINE HYDROCHLORIDE 10 MG/1
10 TABLET ORAL DAILY
COMMUNITY

## 2019-05-03 RX ORDER — FLUTICASONE PROPIONATE 50 MCG
2 SPRAY, SUSPENSION (ML) NASAL DAILY
Qty: 48 EACH | Refills: 11 | Status: SHIPPED | OUTPATIENT
Start: 2019-05-03 | End: 2020-09-30 | Stop reason: ALTCHOICE

## 2019-05-11 DIAGNOSIS — G25.81 RESTLESS LEG: ICD-10-CM

## 2019-05-13 RX ORDER — ROPINIROLE 0.5 MG/1
TABLET, FILM COATED ORAL
Qty: 120 TABLET | Refills: 0 | Status: SHIPPED | OUTPATIENT
Start: 2019-05-13 | End: 2019-06-19 | Stop reason: SDUPTHER

## 2019-05-14 DIAGNOSIS — E11.9 TYPE 2 DIABETES MELLITUS WITHOUT COMPLICATION, WITHOUT LONG-TERM CURRENT USE OF INSULIN (HCC): ICD-10-CM

## 2019-06-19 DIAGNOSIS — G25.81 RESTLESS LEG: ICD-10-CM

## 2019-06-19 RX ORDER — ROPINIROLE 0.5 MG/1
TABLET, FILM COATED ORAL
Qty: 120 TABLET | Refills: 0 | Status: SHIPPED | OUTPATIENT
Start: 2019-06-19 | End: 2019-08-16 | Stop reason: SDUPTHER

## 2019-07-26 ENCOUNTER — TELEPHONE (OUTPATIENT)
Dept: PULMONOLOGY | Facility: HOSPITAL | Age: 54
End: 2019-07-26

## 2019-07-26 DIAGNOSIS — E78.01 FAMILIAL HYPERCHOLESTEROLEMIA: Chronic | ICD-10-CM

## 2019-07-26 DIAGNOSIS — Z12.5 SCREENING PSA (PROSTATE SPECIFIC ANTIGEN): ICD-10-CM

## 2019-07-26 DIAGNOSIS — Z79.4 TYPE 2 DIABETES MELLITUS WITH HYPERGLYCEMIA, WITH LONG-TERM CURRENT USE OF INSULIN (HCC): ICD-10-CM

## 2019-07-26 DIAGNOSIS — E11.9 TYPE 2 DIABETES MELLITUS WITHOUT COMPLICATION, WITHOUT LONG-TERM CURRENT USE OF INSULIN (HCC): Primary | Chronic | ICD-10-CM

## 2019-07-26 DIAGNOSIS — E78.2 MIXED HYPERLIPIDEMIA: ICD-10-CM

## 2019-07-26 DIAGNOSIS — E87.6 HYPOKALEMIA: ICD-10-CM

## 2019-07-26 DIAGNOSIS — M10.9 GOUT, UNSPECIFIED CAUSE, UNSPECIFIED CHRONICITY, UNSPECIFIED SITE: ICD-10-CM

## 2019-07-26 DIAGNOSIS — E11.65 TYPE 2 DIABETES MELLITUS WITH HYPERGLYCEMIA, WITH LONG-TERM CURRENT USE OF INSULIN (HCC): ICD-10-CM

## 2019-07-26 RX ORDER — ALLOPURINOL 100 MG/1
TABLET ORAL
Qty: 60 TABLET | Refills: 0 | Status: SHIPPED | OUTPATIENT
Start: 2019-07-26 | End: 2019-09-11 | Stop reason: SDUPTHER

## 2019-07-27 DIAGNOSIS — Z79.4 TYPE 2 DIABETES MELLITUS WITH HYPERGLYCEMIA, WITH LONG-TERM CURRENT USE OF INSULIN (HCC): ICD-10-CM

## 2019-07-27 DIAGNOSIS — E11.65 TYPE 2 DIABETES MELLITUS WITH HYPERGLYCEMIA, WITH LONG-TERM CURRENT USE OF INSULIN (HCC): ICD-10-CM

## 2019-07-28 DIAGNOSIS — E78.2 MIXED HYPERLIPIDEMIA: ICD-10-CM

## 2019-07-29 RX ORDER — PIOGLITAZONEHYDROCHLORIDE 30 MG/1
TABLET ORAL
Qty: 90 TABLET | Refills: 0 | Status: SHIPPED | OUTPATIENT
Start: 2019-07-29 | End: 2019-08-16

## 2019-07-29 RX ORDER — ATORVASTATIN CALCIUM 20 MG/1
TABLET, FILM COATED ORAL
Qty: 90 TABLET | Refills: 0 | Status: SHIPPED | OUTPATIENT
Start: 2019-07-29 | End: 2019-12-11 | Stop reason: SDUPTHER

## 2019-08-07 ENCOUNTER — TELEPHONE (OUTPATIENT)
Dept: FAMILY MEDICINE CLINIC | Facility: CLINIC | Age: 54
End: 2019-08-07

## 2019-08-07 LAB
ALBUMIN SERPL-MCNC: 4.4 G/DL (ref 3.5–5.5)
ALBUMIN/GLOB SERPL: 1.7 {RATIO} (ref 1.2–2.2)
ALP SERPL-CCNC: 90 IU/L (ref 39–117)
ALT SERPL-CCNC: 21 IU/L (ref 0–44)
AST SERPL-CCNC: 15 IU/L (ref 0–40)
BILIRUB SERPL-MCNC: 0.4 MG/DL (ref 0–1.2)
BUN SERPL-MCNC: 11 MG/DL (ref 6–24)
BUN/CREAT SERPL: 14 (ref 9–20)
CALCIUM SERPL-MCNC: 9.2 MG/DL (ref 8.7–10.2)
CHLORIDE SERPL-SCNC: 98 MMOL/L (ref 96–106)
CHOLEST SERPL-MCNC: 152 MG/DL (ref 100–199)
CHOLEST/HDLC SERPL: 3.2 RATIO (ref 0–5)
CO2 SERPL-SCNC: 27 MMOL/L (ref 20–29)
CREAT SERPL-MCNC: 0.81 MG/DL (ref 0.76–1.27)
EST. AVERAGE GLUCOSE BLD GHB EST-MCNC: 126 MG/DL
GLOBULIN SER-MCNC: 2.6 G/DL (ref 1.5–4.5)
GLUCOSE SERPL-MCNC: 101 MG/DL (ref 65–99)
HBA1C MFR BLD: 6 % (ref 4.8–5.6)
HDLC SERPL-MCNC: 48 MG/DL
LDLC SERPL CALC-MCNC: 58 MG/DL (ref 0–99)
MICROALBUMIN UR-MCNC: 4.6 UG/ML
POTASSIUM SERPL-SCNC: 4.1 MMOL/L (ref 3.5–5.2)
PROT SERPL-MCNC: 7 G/DL (ref 6–8.5)
PSA SERPL-MCNC: 0.9 NG/ML (ref 0–4)
SL AMB EGFR AFRICAN AMERICAN: 116 ML/MIN/1.73
SL AMB EGFR NON AFRICAN AMERICAN: 100 ML/MIN/1.73
SL AMB REFLEX CRITERIA: NORMAL
SL AMB VLDL CHOLESTEROL CALC: 46 MG/DL (ref 5–40)
SODIUM SERPL-SCNC: 141 MMOL/L (ref 134–144)
TRIGL SERPL-MCNC: 230 MG/DL (ref 0–149)
URATE SERPL-MCNC: 5.6 MG/DL (ref 3.7–8.6)

## 2019-08-07 NOTE — TELEPHONE ENCOUNTER
Patient notified as per Dr Anjel Ibanez -- appointment provided for 8/16/19 @ [phone]      ----- Message from Maris Yarbrough MD sent at 8/7/2019  7:39 AM EDT -----  MM/LABS 6 mos    LAST DM VISIT 1/30/19 -- DUE NOW

## 2019-08-16 ENCOUNTER — OFFICE VISIT (OUTPATIENT)
Dept: FAMILY MEDICINE CLINIC | Facility: CLINIC | Age: 54
End: 2019-08-16
Payer: COMMERCIAL

## 2019-08-16 VITALS
SYSTOLIC BLOOD PRESSURE: 130 MMHG | HEART RATE: 88 BPM | OXYGEN SATURATION: 95 % | BODY MASS INDEX: 46.65 KG/M2 | DIASTOLIC BLOOD PRESSURE: 82 MMHG | RESPIRATION RATE: 24 BRPM | TEMPERATURE: 97.8 F | WEIGHT: 315 LBS | HEIGHT: 69 IN

## 2019-08-16 DIAGNOSIS — E66.01 MORBID OBESITY (HCC): ICD-10-CM

## 2019-08-16 DIAGNOSIS — E11.9 TYPE 2 DIABETES MELLITUS WITHOUT COMPLICATION, WITHOUT LONG-TERM CURRENT USE OF INSULIN (HCC): Primary | Chronic | ICD-10-CM

## 2019-08-16 DIAGNOSIS — I10 HYPERTENSION, UNSPECIFIED TYPE: Chronic | ICD-10-CM

## 2019-08-16 DIAGNOSIS — E11.9 TYPE 2 DIABETES MELLITUS WITHOUT COMPLICATION, WITHOUT LONG-TERM CURRENT USE OF INSULIN (HCC): ICD-10-CM

## 2019-08-16 DIAGNOSIS — G25.81 RESTLESS LEG: ICD-10-CM

## 2019-08-16 PROCEDURE — 99214 OFFICE O/P EST MOD 30 MIN: CPT | Performed by: FAMILY MEDICINE

## 2019-08-16 PROCEDURE — 3008F BODY MASS INDEX DOCD: CPT | Performed by: FAMILY MEDICINE

## 2019-08-16 PROCEDURE — 3075F SYST BP GE 130 - 139MM HG: CPT | Performed by: FAMILY MEDICINE

## 2019-08-16 RX ORDER — ROPINIROLE 0.5 MG/1
TABLET, FILM COATED ORAL
Qty: 120 TABLET | Refills: 0 | Status: SHIPPED | OUTPATIENT
Start: 2019-08-16 | End: 2019-08-22

## 2019-08-16 NOTE — PROGRESS NOTES
[facility]        NAME: Angelique Tinoco is a 53 y o  male  : 1965    MRN: [id number]  DATE: 2019  TIME: 8:03 AM    Assessment and Plan   Type 2 diabetes mellitus without complication, without long-term current use of insulin (HCC) [E11 9]  1  Type 2 diabetes mellitus without complication, without long-term current use of insulin ([address] )     2  Restless leg  metFORMIN (GLUCOPHAGE) 1000 MG tablet    rOPINIRole (REQUIP) 0 5 mg tablet   3  Hypertension, unspecified type           Patient Instructions     Patient Instructions   D/c actos--wt gain/edema--ck labs3-6mos--incr metformin          Chief Complaint     Chief Complaint   Patient presents with    Follow-up     patient presents for a follow up to chronic conditions/medication check  blood work done 19    bilateral foot swelling     for the past 2 weeks  History of Present Illness       F/u assessed med cond--stable      Review of Systems   Review of Systems   Constitutional: Negative for fatigue, fever and unexpected weight change  HENT: Negative for congestion, sinus pain and sore throat  Eyes: Negative for visual disturbance  Respiratory: Negative for shortness of breath and wheezing  Cardiovascular: Negative for chest pain and palpitations  Gastrointestinal: Negative for abdominal pain, nausea and vomiting  Musculoskeletal: Negative  Negative for arthralgias and myalgias  Neurological: Negative for syncope, weakness and numbness  Psychiatric/Behavioral: Negative  Negative for confusion, dysphoric mood and suicidal ideas           Current Medications       Current Outpatient Medications:     albuterol (2 5 mg/3 mL) 0 083 % nebulizer solution, Take 1 vial (2 5 mg total) by nebulization every 6 (six) hours as needed for wheezing or shortness of breath, Disp: 90 vial, Rfl: 5    albuterol (VENTOLIN HFA) 90 mcg/act inhaler, Inhale 2 puffs every 6 (six) hours as needed for wheezing, Disp: 18 g, Rfl: 3    allopurinol (ZYLOPRIM) 100 mg tablet, take 2 tablets by mouth once daily, Disp: 60 tablet, Rfl: 0    atorvastatin (LIPITOR) 20 mg tablet, TAKE 1 TABLET BY MOUTH EVERY DAY, Disp: 90 tablet, Rfl: 0    budesonide-formoterol (SYMBICORT) 160-4 5 mcg/act inhaler, Inhale 2 puffs 2 (two) times a day Rinse mouth after use , Disp: 1 Inhaler, Rfl: 5    cetirizine (ZyrTEC) 10 mg tablet, Take 10 mg by mouth daily, Disp: , Rfl:     fluticasone (FLONASE) 50 mcg/act nasal spray, 2 sprays into each nostril daily, Disp: 48 each, Rfl: 11    glipiZIDE (GLUCOTROL) 5 mg tablet, TAKE 1 TABLET (5 MG TOTAL) BY MOUTH 2 (TWO) TIMES A DAY BEFORE MEALS, Disp: 60 tablet, Rfl: 2    indomethacin (INDOCIN) 25 mg capsule, Take by mouth every 8 (eight) hours, Disp: , Rfl:     lisinopril (ZESTRIL) 40 mg tablet, Take 1 tablet (40 mg total) by mouth daily, Disp: 90 tablet, Rfl: 1    montelukast (SINGULAIR) 10 mg tablet, Take 1 tablet (10 mg total) by mouth daily at bedtime, Disp: 90 each, Rfl: 11    Multiple Vitamin (MULTI-VITAMIN DAILY PO), Take 1 tablet by mouth daily, Disp: , Rfl:     potassium chloride (K-DUR,KLOR-CON) 10 mEq tablet, Take 1 tablet by mouth 2 (two) times a day, Disp: 60 tablet, Rfl: 0    rOPINIRole (REQUIP) 0 5 mg tablet, Take 1-4 tablets at bedtime as needed, Disp: 120 tablet, Rfl: 0    gabapentin (NEURONTIN) 100 mg capsule, Take by mouth, Disp: , Rfl:     hylan (SYNVISC) injection, Inject into the joint, Disp: , Rfl:     metFORMIN (GLUCOPHAGE) 1000 MG tablet, Take 1 tablet (1,000 mg total) by mouth 2 (two) times a day with meals, Disp: 180 tablet, Rfl: 1    Current Allergies     Allergies as of 08/16/2019 - Reviewed 08/16/2019   Allergen Reaction Noted    Other Cough and Nasal Congestion 08/16/2019            The following portions of the patient's history were reviewed and updated as appropriate: allergies, current medications, past family history, past medical history, past social history, past surgical history and problem list      Past Medical History:   Diagnosis Date    Asthma     Chronic pain     Diabetes mellitus ([address] )     Hyperlipidemia     Hypertension     Impaired fasting glucose     last assessed: 12/16/2015    Knee arthropathy     last assessed: 3/23/2016       Past Surgical History:   Procedure Laterality Date    KNEE ARTHROSCOPY Bilateral     KNEE SURGERY         Family History   Problem Relation Age of Onset    Coronary artery disease Father     Cancer Other         malignant neoplasm    Asthma Brother     Asthma Daughter          Medications have been verified  Objective   /82 (BP Location: Left arm, Patient Position: Sitting, Cuff Size: Large)   Pulse 88   Temp 97 8 °F (36 6 °C) (Tympanic)   Resp (!) 24   Ht 5' 8 9" (1 75 m)   Wt (!) 161 kg (355 lb 9 6 oz)   SpO2 95%   BMI 52 67 kg/m²        Physical Exam     Physical Exam   Constitutional: He is oriented to person, place, and time  Vital signs are normal  He appears well-developed and well-nourished  HENT:   Right Ear: Ear canal normal  Tympanic membrane is not injected  Left Ear: Ear canal normal  Tympanic membrane is not injected  Nose: Nose normal    Mouth/Throat: Oropharynx is clear and moist    Eyes: Pupils are equal, round, and reactive to light  Conjunctivae and EOM are normal  Right eye exhibits no discharge  Left eye exhibits no discharge  Neck: Normal range of motion  Neck supple  No thyromegaly present  Cardiovascular: Normal rate, regular rhythm and normal heart sounds  No murmur heard  Pulmonary/Chest: Effort normal and breath sounds normal  No respiratory distress  He has no wheezes  Abdominal: Soft  Bowel sounds are normal  He exhibits no distension  There is no tenderness  Musculoskeletal: Normal range of motion  Feet:   Left Foot:   Skin Integrity: Negative for ulcer, skin breakdown, erythema, warmth, callus or dry skin  Lymphadenopathy:     He has no cervical adenopathy  Neurological: He is alert and oriented to person, place, and time  He has normal strength and normal reflexes  He is not disoriented  No sensory deficit  Gait normal    Skin: Skin is warm and dry  Psychiatric: He has a normal mood and affect  His speech is normal and behavior is normal  Judgment and thought content normal  Cognition and memory are normal      BMI Counseling: Body mass index is 52 67 kg/m²  Discussed the patient's BMI with him  The BMI is above average  BMI counseling and education was provided to the patient  Nutrition recommendations include reducing portion sizes          Left Foot/Ankle  Left Foot Inspection  Skin Exam: skin normal and skin intactno dry skin, no warmth, no erythema, no maceration, normal color, no pre-ulcer, no ulcer and no callus                                             Assign Risk Category:  ; ;        Risk: 0

## 2019-08-22 DIAGNOSIS — G25.81 RESTLESS LEG: Primary | ICD-10-CM

## 2019-08-22 RX ORDER — ROPINIROLE 2 MG/1
2 TABLET, FILM COATED ORAL
Qty: 90 TABLET | Refills: 1 | Status: SHIPPED | OUTPATIENT
Start: 2019-08-22 | End: 2020-02-11

## 2019-08-22 NOTE — TELEPHONE ENCOUNTER
Patient called stating his is out of his medication for Requip 0 5 mg, patient was given 120 tablets on 8/16/19, I told patient that he is to take that RX as needed (RX instructions), patient stated that he takes 4 tablets every day  Per Josie Barker, we can change Rx up to dosage up to 2 mg 1 tablet a day and see if it works for patient  Patient notified

## 2019-08-29 ENCOUNTER — TELEPHONE (OUTPATIENT)
Dept: FAMILY MEDICINE CLINIC | Facility: CLINIC | Age: 54
End: 2019-08-29

## 2019-08-29 ENCOUNTER — OFFICE VISIT (OUTPATIENT)
Dept: URGENT CARE | Facility: CLINIC | Age: 54
End: 2019-08-29
Payer: COMMERCIAL

## 2019-08-29 VITALS
RESPIRATION RATE: 16 BRPM | TEMPERATURE: 99.4 F | OXYGEN SATURATION: 97 % | HEIGHT: 70 IN | SYSTOLIC BLOOD PRESSURE: 130 MMHG | DIASTOLIC BLOOD PRESSURE: 108 MMHG | WEIGHT: 315 LBS | HEART RATE: 94 BPM | BODY MASS INDEX: 45.1 KG/M2

## 2019-08-29 DIAGNOSIS — B96.89 ACUTE BACTERIAL BRONCHITIS: ICD-10-CM

## 2019-08-29 DIAGNOSIS — J20.8 ACUTE BACTERIAL BRONCHITIS: ICD-10-CM

## 2019-08-29 DIAGNOSIS — J02.9 SORE THROAT: Primary | ICD-10-CM

## 2019-08-29 LAB — S PYO AG THROAT QL: NEGATIVE

## 2019-08-29 PROCEDURE — 87880 STREP A ASSAY W/OPTIC: CPT | Performed by: PHYSICIAN ASSISTANT

## 2019-08-29 PROCEDURE — 87070 CULTURE OTHR SPECIMN AEROBIC: CPT | Performed by: PHYSICIAN ASSISTANT

## 2019-08-29 PROCEDURE — G0382 LEV 3 HOSP TYPE B ED VISIT: HCPCS | Performed by: PHYSICIAN ASSISTANT

## 2019-08-29 RX ORDER — AZITHROMYCIN 250 MG/1
TABLET, FILM COATED ORAL
Qty: 6 TABLET | Refills: 0 | Status: SHIPPED | OUTPATIENT
Start: 2019-08-29 | End: 2019-09-02

## 2019-08-29 NOTE — TELEPHONE ENCOUNTER
Pt called due to a cough, lost voice, sinus congestion  Pt unable to make appt here for times we had available -- nothing else  available for the week  Advised to f/u in urgent care

## 2019-08-29 NOTE — PROGRESS NOTES
[facility]        NAME: Cortney Urbina is a 53 y o  male  : 1965    MRN: [id number]  DATE: 2019  TIME: 3:29 PM    Assessment and Plan   Sore throat [J02 9]  1  Sore throat  POCT rapid strepA    azithromycin (ZITHROMAX) 250 mg tablet   2  Acute bacterial bronchitis  azithromycin (ZITHROMAX) 250 mg tablet         Patient Instructions     -antibiotics as directed  -rapid strep was negative- will send out for culture  -OTC cold medication as needed for symptomatic relief  Follow up with PCP in 3-5 days  Proceed to  ER if symptoms worsen  Chief Complaint     Chief Complaint   Patient presents with    Sore Throat     itchy, irritated, losing voice, b/l itchy ears; denies fevers; pain 4/10; b/l leg swelling and rash noted on both feet         History of Present Illness       Patient is here for evaluation of cold symptoms  His symptoms started a week ago  He has had a productive cough, sinus pressure, post nasal drip and now a sore throat  He states he is not improving  He did not check his temperature  He denies fevers, chills, SOB,CP, nausea, vomiting, diarrhea  He does have swelling in his lower extremities which is not new   He does have a patch of itchy skin on the top of his left foot in the past day or too  He has no pain in his foot  He has been taking OTC nyquil and tylenol without improvement  He does have chronic asthma and has been needing his inhalers  He was unable to get in with his PCP today  Review of Systems   Review of Systems   Constitutional: Negative for chills and fever  HENT: Positive for ear pain, postnasal drip, sinus pressure, sore throat and voice change  Negative for rhinorrhea and sinus pain  Eyes: Negative  Respiratory: Positive for cough and shortness of breath  Negative for wheezing  Cardiovascular: Positive for leg swelling  Negative for chest pain  Gastrointestinal: Negative for abdominal pain, diarrhea, nausea and vomiting     Skin: Positive for rash  Psychiatric/Behavioral: Negative            Current Medications       Current Outpatient Medications:     albuterol (2 5 mg/3 mL) 0 083 % nebulizer solution, Take 1 vial (2 5 mg total) by nebulization every 6 (six) hours as needed for wheezing or shortness of breath, Disp: 90 vial, Rfl: 5    albuterol (VENTOLIN HFA) 90 mcg/act inhaler, Inhale 2 puffs every 6 (six) hours as needed for wheezing, Disp: 18 g, Rfl: 3    allopurinol (ZYLOPRIM) 100 mg tablet, take 2 tablets by mouth once daily, Disp: 60 tablet, Rfl: 0    atorvastatin (LIPITOR) 20 mg tablet, TAKE 1 TABLET BY MOUTH EVERY DAY, Disp: 90 tablet, Rfl: 0    budesonide-formoterol (SYMBICORT) 160-4 5 mcg/act inhaler, Inhale 2 puffs 2 (two) times a day Rinse mouth after use , Disp: 1 Inhaler, Rfl: 5    cetirizine (ZyrTEC) 10 mg tablet, Take 10 mg by mouth daily, Disp: , Rfl:     lisinopril (ZESTRIL) 40 mg tablet, Take 1 tablet (40 mg total) by mouth daily, Disp: 90 tablet, Rfl: 1    metFORMIN (GLUCOPHAGE) 1000 MG tablet, Take 1 tablet (1,000 mg total) by mouth 2 (two) times a day with meals, Disp: 180 tablet, Rfl: 1    montelukast (SINGULAIR) 10 mg tablet, Take 1 tablet (10 mg total) by mouth daily at bedtime, Disp: 90 each, Rfl: 11    Multiple Vitamin (MULTI-VITAMIN DAILY PO), Take 1 tablet by mouth daily, Disp: , Rfl:     potassium chloride (K-DUR,KLOR-CON) 10 mEq tablet, Take 1 tablet by mouth 2 (two) times a day, Disp: 60 tablet, Rfl: 0    rOPINIRole (REQUIP) 2 mg tablet, Take 1 tablet (2 mg total) by mouth daily at bedtime, Disp: 90 tablet, Rfl: 1    azithromycin (ZITHROMAX) 250 mg tablet, Take 2 tablets today then 1 tablet daily x 4 days, Disp: 6 tablet, Rfl: 0    fluticasone (FLONASE) 50 mcg/act nasal spray, 2 sprays into each nostril daily (Patient not taking: Reported on 8/29/2019), Disp: 48 each, Rfl: 11    gabapentin (NEURONTIN) 100 mg capsule, Take by mouth, Disp: , Rfl:     glipiZIDE (GLUCOTROL) 5 mg tablet, TAKE 1 TABLET (5 MG TOTAL) BY MOUTH 2 (TWO) TIMES A DAY BEFORE MEALS (Patient not taking: Reported on 8/29/2019), Disp: 60 tablet, Rfl: 2    hylan (SYNVISC) injection, Inject into the joint, Disp: , Rfl:     indomethacin (INDOCIN) 25 mg capsule, Take by mouth every 8 (eight) hours, Disp: , Rfl:     Current Allergies     Allergies as of 08/29/2019 - Reviewed 08/29/2019   Allergen Reaction Noted    Other Cough and Nasal Congestion 08/16/2019            The following portions of the patient's history were reviewed and updated as appropriate: allergies, current medications, past family history, past medical history, past social history, past surgical history and problem list      Past Medical History:   Diagnosis Date    Asthma     Chronic pain     [facility] mellitus [address] [address]     Hyperlipidemia     Hypertension     Impaired fasting glucose     last assessed: 12/16/2015    Knee arthropathy     last assessed: 3/23/2016       Past Surgical History:   Procedure Laterality Date    KNEE ARTHROSCOPY Bilateral     KNEE SURGERY         Family History   Problem Relation Age of Onset    Coronary artery disease Father     Cancer Other         malignant neoplasm    Asthma Brother     Asthma Daughter          Medications have been verified  Objective   BP (!) 130/108   Pulse 94   Temp 99 4 °F (37 4 °C) (Tympanic)   Resp 16   Ht 5' 10" (1 778 m)   Wt (!) 165 kg (363 lb 12 8 oz)   SpO2 97%   BMI 52 20 kg/m²        Physical Exam     Physical Exam   Constitutional: He is oriented to person, place, and time  He appears well-developed and well-nourished  No distress  HENT:   Head: Normocephalic and atraumatic  Right Ear: Tympanic membrane and ear canal normal  No middle ear effusion  Left Ear: Tympanic membrane and ear canal normal   No middle ear effusion  Mouth/Throat: Uvula is midline and mucous membranes are normal  No oral lesions  No uvula swelling  Posterior oropharyngeal erythema present   No oropharyngeal exudate or posterior oropharyngeal edema  No tonsillar exudate  Eyes: Pupils are equal, round, and reactive to light  EOM are normal    Neck: Normal range of motion  Cardiovascular: Normal rate and regular rhythm  No murmur heard  Pulmonary/Chest: Effort normal and breath sounds normal  He has no wheezes  He has no rhonchi  He has no rales  Abdominal: Soft  Bowel sounds are normal  He exhibits no distension  There is no tenderness  There is no rebound and no guarding  Neurological: He is alert and oriented to person, place, and time  Skin: Skin is warm and dry  No erythema  Dry rash on top of left foot, no erythema, no drainage  Psychiatric: He has a normal mood and affect  His behavior is normal    Nursing note and vitals reviewed

## 2019-08-29 NOTE — PATIENT INSTRUCTIONS
-take antibiotics as directed  -OTC medications as needed  -apply hydrocortisone cream to left foot as needed  - follow up with PCP 3-5 days  -ER if symptoms worsen

## 2019-08-31 LAB — BACTERIA THROAT CULT: NORMAL

## 2019-09-11 DIAGNOSIS — M10.9 GOUT, UNSPECIFIED CAUSE, UNSPECIFIED CHRONICITY, UNSPECIFIED SITE: ICD-10-CM

## 2019-09-12 RX ORDER — ALLOPURINOL 100 MG/1
200 TABLET ORAL DAILY
Qty: 60 TABLET | Refills: 3 | Status: SHIPPED | OUTPATIENT
Start: 2019-09-12 | End: 2020-01-10

## 2019-10-15 ENCOUNTER — TELEPHONE (OUTPATIENT)
Dept: FAMILY MEDICINE CLINIC | Facility: CLINIC | Age: 54
End: 2019-10-15

## 2019-10-15 DIAGNOSIS — J45.41 MODERATE PERSISTENT ASTHMA WITH ACUTE EXACERBATION: Primary | ICD-10-CM

## 2019-10-15 RX ORDER — PREDNISONE 20 MG/1
TABLET ORAL
Qty: 15 TABLET | Refills: 0 | Status: SHIPPED | OUTPATIENT
Start: 2019-10-15 | End: 2019-10-30

## 2019-10-15 NOTE — TELEPHONE ENCOUNTER
Pt  Call requesting Rx prednisone pt stated his asthma is acting and inhaler is not helping that much

## 2019-10-29 DIAGNOSIS — Z79.4 TYPE 2 DIABETES MELLITUS WITH HYPERGLYCEMIA, WITH LONG-TERM CURRENT USE OF INSULIN (HCC): ICD-10-CM

## 2019-10-29 DIAGNOSIS — E03.9 ACQUIRED HYPOTHYROIDISM: ICD-10-CM

## 2019-10-29 DIAGNOSIS — E11.65 TYPE 2 DIABETES MELLITUS WITH HYPERGLYCEMIA, WITH LONG-TERM CURRENT USE OF INSULIN (HCC): ICD-10-CM

## 2019-10-29 RX ORDER — PIOGLITAZONEHYDROCHLORIDE 30 MG/1
TABLET ORAL
Qty: 1 TABLET | Refills: 0 | Status: SHIPPED | OUTPATIENT
Start: 2019-10-29 | End: 2020-09-30 | Stop reason: ALTCHOICE

## 2019-10-29 RX ORDER — LISINOPRIL 40 MG/1
40 TABLET ORAL DAILY
Qty: 90 TABLET | Refills: 0 | Status: SHIPPED | OUTPATIENT
Start: 2019-10-29 | End: 2020-01-24

## 2019-10-30 ENCOUNTER — OFFICE VISIT (OUTPATIENT)
Dept: PULMONOLOGY | Facility: HOSPITAL | Age: 54
End: 2019-10-30
Payer: COMMERCIAL

## 2019-10-30 VITALS
OXYGEN SATURATION: 94 % | HEIGHT: 70 IN | BODY MASS INDEX: 45.1 KG/M2 | HEART RATE: 98 BPM | SYSTOLIC BLOOD PRESSURE: 124 MMHG | WEIGHT: 315 LBS | DIASTOLIC BLOOD PRESSURE: 78 MMHG

## 2019-10-30 DIAGNOSIS — Z99.89 OBSTRUCTIVE SLEEP APNEA ON CPAP: ICD-10-CM

## 2019-10-30 DIAGNOSIS — G47.33 OBSTRUCTIVE SLEEP APNEA ON CPAP: ICD-10-CM

## 2019-10-30 DIAGNOSIS — J45.51 SEVERE PERSISTENT ALLERGIC ASTHMA WITH ACUTE EXACERBATION: Primary | ICD-10-CM

## 2019-10-30 DIAGNOSIS — Z23 NEEDS FLU SHOT: ICD-10-CM

## 2019-10-30 PROCEDURE — 90682 RIV4 VACC RECOMBINANT DNA IM: CPT

## 2019-10-30 PROCEDURE — 99213 OFFICE O/P EST LOW 20 MIN: CPT | Performed by: INTERNAL MEDICINE

## 2019-10-30 PROCEDURE — 90471 IMMUNIZATION ADMIN: CPT

## 2019-10-30 RX ORDER — IPRATROPIUM BROMIDE AND ALBUTEROL SULFATE 2.5; .5 MG/3ML; MG/3ML
3 SOLUTION RESPIRATORY (INHALATION) 4 TIMES DAILY
Qty: 120 VIAL | Refills: 6 | Status: SHIPPED | OUTPATIENT
Start: 2019-10-30

## 2019-10-30 NOTE — ASSESSMENT & PLAN NOTE
He is recovering from recent exacerbation  He is having a lot of DM complications from prednisone with severe hyperglycemia (will try and avoid in the future)    A lot of his symptoms seem to be related to upper airway/sinus congestion/vocal cord  Will have him see ENT to evaluate further  I have asked him to continue Symbicort BID but add duoneb 3-4 times daily    In the future, when he starts with exacerbation, I would like him to do nebs 4 times daily and we could consider adding pulmicort BID to avoid systemic steroids    In 1 month we will check CBC with diff and IgE/RAST to assess for other possible treatment options  He will continue Singulair for now       He is agreeable to get a flu shot today

## 2019-10-30 NOTE — ASSESSMENT & PLAN NOTE
He has not been using CPAP as he doesn't snore anymore  At some point, if he continues to have weight gain, he may need repeat sleep study

## 2019-10-30 NOTE — PROGRESS NOTES
Assessment:    Severe persistent allergic asthma with acute exacerbation  He is recovering from recent exacerbation  He is having a lot of DM complications from prednisone with severe hyperglycemia (will try and avoid in the future)    A lot of his symptoms seem to be related to upper airway/sinus congestion/vocal cord  Will have him see ENT to evaluate further  I have asked him to continue Symbicort BID but add duoneb 3-4 times daily    In the future, when he starts with exacerbation, I would like him to do nebs 4 times daily and we could consider adding pulmicort BID to avoid systemic steroids    In 1 month we will check CBC with diff and IgE/RAST to assess for other possible treatment options  He will continue Singulair for now  He is agreeable to get a flu shot today    Obstructive sleep apnea on CPAP  He has not been using CPAP as he doesn't snore anymore  At some point, if he continues to have weight gain, he may need repeat sleep study      Plan:    Diagnoses and all orders for this visit:    Severe persistent allergic asthma with acute exacerbation  -     Ambulatory Referral to Otolaryngology; Future  -     ipratropium-albuterol (DUO-NEB) 0 5-2 5 mg/3 mL nebulizer solution; Take 1 vial (3 mL total) by nebulization 4 (four) times a day  -     CBC and differential; Future  -     [facility] Panel, Adult; Future  -     IgE;  Future  -     influenza vaccine, [id number], quadrivalent, recombinant, PF, 0 5 mL, for patients 18 yr+ (FLUBLOK)    Obstructive sleep apnea on CPAP    Needs flu shot  -     influenza vaccine, [id number], quadrivalent, recombinant, PF, 0 5 mL, for patients 18 yr+ (FLUBLOK)        Follow-up: 2 months      HPI:  3 weeks ago he started with increased congestion  He saw Dr Nhi Wolf and he was started on a course of prednisone  He continues to have cough, wheeze  He has a lot of nasal congestion and sinus drainage    He had a problem with hyperglycemia requiring insulin from the prednisone    He is using Symbicort BID - he was not needing additional medications until this recent illness        Review of Systems    The following portions of the patient's history were reviewed and updated as appropriate: allergies, current medications, past family history, past medical history, past social history, past surgical history and problem list     VITALS:  Vitals:    10/30/19 1452   BP: 124/78   BP Location: Left arm   Patient Position: Sitting   Cuff Size: Standard   Pulse: 98   SpO2: 94%   Weight: (!) 159 kg (350 lb)   Height: 5' 10" (1 778 m)       Physical Exam  General:  Patient is awake, alert, non-toxic and in no acute respiratory distress  Neck: No JVD, inspiratory wheeze  CV:  Regular, +S1 and S2, No murmurs, gallops or rubs appreciated  Lungs: CTA bilateral without wheeze  Abdomen: Soft, +BS, Non-tender, non-distended  Extremities: No clubbing, cyanosis or edema  Neuro: No focal deficits        Diagnostic Testing:    CBC:  Lab Results   Component Value Date    WBC 9 36 01/21/2019    HGB 15 8 01/21/2019    HCT 47 1 01/21/2019    MCV 88 01/21/2019     01/21/2019         BMP:   Lab Results   Component Value Date     01/17/2018    K 4 1 08/06/2019    CL 98 08/06/2019    CO2 27 08/06/2019    BUN 11 08/06/2019    CREATININE 0 81 08/06/2019    GLUCOSE 99 01/17/2018    CALCIUM 8 9 01/21/2019    AST 15 08/06/2019    ALT 21 08/06/2019    ALKPHOS 116 01/21/2019    PROT 6 9 01/17/2018    BILITOT 0 5 01/17/2018    EGFR 92 01/21/2019       Kathrvincenzo Feeling, DO

## 2019-10-30 NOTE — PATIENT INSTRUCTIONS
-continue Symbicort 2 puffs twice daily, rinse mouth out after use  -use DuoNeb nebulizer treatments 3-4 times daily    -get blood work in 1 month    -go to ENT appointment

## 2019-11-04 ENCOUNTER — TELEPHONE (OUTPATIENT)
Dept: PULMONOLOGY | Facility: HOSPITAL | Age: 54
End: 2019-11-04

## 2019-11-05 NOTE — TELEPHONE ENCOUNTER
Patient left message asking for a different time for this appt so he does not have to leave work  Please call patient

## 2019-11-06 NOTE — TELEPHONE ENCOUNTER
Good morning, that's what they had available, I' ll call the Pt  and ask him to call so that he can schedule to his conveniece

## 2019-11-09 DIAGNOSIS — J40 BRONCHITIS: ICD-10-CM

## 2019-11-09 RX ORDER — ALBUTEROL SULFATE 90 UG/1
AEROSOL, METERED RESPIRATORY (INHALATION)
Qty: 18 G | Refills: 0 | Status: CANCELLED | OUTPATIENT
Start: 2019-11-09

## 2019-11-11 RX ORDER — ALBUTEROL SULFATE 90 UG/1
AEROSOL, METERED RESPIRATORY (INHALATION)
Qty: 18 G | Refills: 2 | Status: SHIPPED | OUTPATIENT
Start: 2019-11-11 | End: 2020-01-29 | Stop reason: SDUPTHER

## 2019-11-22 ENCOUNTER — TELEPHONE (OUTPATIENT)
Dept: PULMONOLOGY | Facility: HOSPITAL | Age: 54
End: 2019-11-22

## 2019-11-22 NOTE — TELEPHONE ENCOUNTER
Patient needs a 2m f/u//bw prior in January with Georgi in [address] [facility]  Reminder card sent

## 2019-11-25 ENCOUNTER — TELEPHONE (OUTPATIENT)
Dept: FAMILY MEDICINE CLINIC | Facility: CLINIC | Age: 54
End: 2019-11-25

## 2019-11-25 ENCOUNTER — OFFICE VISIT (OUTPATIENT)
Dept: FAMILY MEDICINE CLINIC | Facility: CLINIC | Age: 54
End: 2019-11-25
Payer: COMMERCIAL

## 2019-11-25 VITALS
BODY MASS INDEX: 45.1 KG/M2 | SYSTOLIC BLOOD PRESSURE: 132 MMHG | TEMPERATURE: 98.6 F | HEART RATE: 111 BPM | HEIGHT: 70 IN | OXYGEN SATURATION: 92 % | WEIGHT: 315 LBS | DIASTOLIC BLOOD PRESSURE: 86 MMHG

## 2019-11-25 DIAGNOSIS — M70.50 PES ANSERINE BURSITIS: ICD-10-CM

## 2019-11-25 DIAGNOSIS — R52 PAIN: Primary | ICD-10-CM

## 2019-11-25 DIAGNOSIS — Z99.89 OBSTRUCTIVE SLEEP APNEA ON CPAP: ICD-10-CM

## 2019-11-25 DIAGNOSIS — E11.9 TYPE 2 DIABETES MELLITUS WITHOUT COMPLICATION, WITHOUT LONG-TERM CURRENT USE OF INSULIN (HCC): Primary | Chronic | ICD-10-CM

## 2019-11-25 DIAGNOSIS — G47.33 OBSTRUCTIVE SLEEP APNEA ON CPAP: ICD-10-CM

## 2019-11-25 PROCEDURE — 1036F TOBACCO NON-USER: CPT | Performed by: FAMILY MEDICINE

## 2019-11-25 PROCEDURE — 99214 OFFICE O/P EST MOD 30 MIN: CPT | Performed by: FAMILY MEDICINE

## 2019-11-25 RX ORDER — OXYCODONE HYDROCHLORIDE AND ACETAMINOPHEN 5; 325 MG/1; MG/1
1 TABLET ORAL EVERY 4 HOURS PRN
Qty: 30 TABLET | Refills: 0 | Status: SHIPPED | OUTPATIENT
Start: 2019-11-25 | End: 2021-03-03

## 2019-11-25 NOTE — TELEPHONE ENCOUNTER
Pt requesting pain medicine for his right kneed Tynelol and is not working [facility] Pt reports no allergy

## 2019-11-25 NOTE — PROGRESS NOTES
[facility]        NAME: Yessi Jiménez is a 53 y o  male  : 1965    MRN: [id number]  DATE: 2019  TIME: 1:56 PM    Assessment and Plan   Type 2 diabetes mellitus without complication, without long-term current use of insulin ([address] ) [E11 9]  1  Type 2 diabetes mellitus without complication, without long-term current use of insulin ([address] )     2  Obstructive sleep apnea on CPAP     3  Pes anserine bursitis           Patient Instructions     Patient Instructions   R pes bursa injected w/ good initial relief          Chief Complaint     Chief Complaint   Patient presents with    Knee Pain     right need, cont using bathroom         History of Present Illness       C/o R medial knee pain ---severe--1 wk--no precip event--walking w/ cane--no resp OTC meds      Review of Systems   Review of Systems   Constitutional: Negative for fatigue, fever and unexpected weight change  HENT: Negative for congestion, sinus pain and sore throat  Eyes: Negative for visual disturbance  Respiratory: Negative for shortness of breath and wheezing  Cardiovascular: Negative for chest pain and palpitations  Gastrointestinal: Negative for abdominal pain, nausea and vomiting  Musculoskeletal: Negative  Negative for arthralgias and myalgias  Neurological: Negative for syncope, weakness and numbness  Psychiatric/Behavioral: Negative  Negative for confusion, dysphoric mood and suicidal ideas           Current Medications       Current Outpatient Medications:     albuterol (PROVENTIL HFA,VENTOLIN HFA) 90 mcg/act inhaler, INHALE 2 PUFFS EVERY 6 HOURS AS NEEDED FOR WHEEZING, Disp: 18 g, Rfl: 2    allopurinol (ZYLOPRIM) 100 mg tablet, Take 2 tablets (200 mg total) by mouth daily, Disp: 60 tablet, Rfl: 3    atorvastatin (LIPITOR) 20 mg tablet, TAKE 1 TABLET BY MOUTH EVERY DAY, Disp: 90 tablet, Rfl: 0    budesonide-formoterol (SYMBICORT) 160-4 5 mcg/act inhaler, Inhale 2 puffs 2 (two) times a day Rinse mouth after use , Disp: 1 Inhaler, Rfl: 5    cetirizine (ZyrTEC) 10 mg tablet, Take 10 mg by mouth daily, Disp: , Rfl:     gabapentin (NEURONTIN) 100 mg capsule, Take by mouth, Disp: , Rfl:     hylan (SYNVISC) injection, Inject into the joint, Disp: , Rfl:     indomethacin (INDOCIN) 25 mg capsule, Take by mouth every 8 (eight) hours, Disp: , Rfl:     ipratropium-albuterol (DUO-NEB) 0 5-2 5 mg/3 mL nebulizer solution, Take 1 vial (3 mL total) by nebulization 4 (four) times a day, Disp: 120 vial, Rfl: 6    lisinopril (ZESTRIL) 40 mg tablet, Take 1 tablet (40 mg total) by mouth daily, Disp: 90 tablet, Rfl: 0    metFORMIN (GLUCOPHAGE) 1000 MG tablet, Take 1 tablet (1,000 mg total) by mouth 2 (two) times a day with meals, Disp: 180 tablet, Rfl: 1    montelukast (SINGULAIR) 10 mg tablet, Take 1 tablet (10 mg total) by mouth daily at bedtime, Disp: 90 each, Rfl: 11    Multiple Vitamin (MULTI-VITAMIN DAILY PO), Take 1 tablet by mouth daily, Disp: , Rfl:     potassium chloride (K-DUR,KLOR-CON) 10 mEq tablet, Take 1 tablet by mouth 2 (two) times a day, Disp: 60 tablet, Rfl: 0    rOPINIRole (REQUIP) 2 mg tablet, Take 1 tablet (2 mg total) by mouth daily at bedtime, Disp: 90 tablet, Rfl: 1    fluticasone (FLONASE) 50 mcg/act nasal spray, 2 sprays into each nostril daily (Patient not taking: Reported on 8/29/2019), Disp: 48 each, Rfl: 11    glipiZIDE (GLUCOTROL) 5 mg tablet, TAKE 1 TABLET (5 MG TOTAL) BY MOUTH 2 (TWO) TIMES A DAY BEFORE MEALS (Patient not taking: Reported on 8/29/2019), Disp: 60 tablet, Rfl: 2    oxyCODONE-acetaminophen (PERCOCET) 5-325 mg per tablet, Take 1 tablet by mouth every 4 (four) hours as needed for moderate painMax Daily Amount: 6 tablets, Disp: 30 tablet, Rfl: 0    oxyCODONE-acetaminophen (PERCOCET) 5-325 mg per tablet, Take 1 tablet by mouth every 4 (four) hours as needed for moderate painMax Daily Amount: 6 tablets, Disp: 30 tablet, Rfl: 0    pioglitazone (ACTOS) 30 mg tablet, TAKE 1 TABLET BY MOUTH EVERY DAY (Patient not taking: Reported on 10/30/2019), Disp: 1 tablet, Rfl: 0    Current Allergies     Allergies as of 11/25/2019 - Reviewed 11/25/2019   Allergen Reaction Noted    Other Cough and Nasal Congestion 08/16/2019            The following portions of the patient's history were reviewed and updated as appropriate: allergies, current medications, past family history, past medical history, past social history, past surgical history and problem list      Past Medical History:   Diagnosis Date    Asthma     Chronic pain     Diabetes mellitus [facility] )     Hyperlipidemia     Hypertension     Impaired fasting glucose     last assessed: 12/16/2015    Knee arthropathy     last assessed: 3/23/2016       Past Surgical History:   Procedure Laterality Date    KNEE ARTHROSCOPY Bilateral     KNEE SURGERY         Family History   Problem Relation Age of Onset    Coronary artery disease Father     Cancer Other         malignant neoplasm    Asthma Brother     Asthma Daughter          Medications have been verified  Objective   /86 (BP Location: Right arm, Patient Position: Sitting, Cuff Size: Extra-Large)   Pulse (!) 111   Temp 98 6 °F (37 °C) (Tympanic)   Ht 5' 10" (1 778 m)   Wt (!) 157 kg (347 lb 3 6 oz)   SpO2 92%   BMI 49 82 kg/m²        Physical Exam     Physical Exam   Cardiovascular: Normal heart sounds  Pulmonary/Chest: Breath sounds normal    Musculoskeletal:   Very tender over pes bursa--no effusion---good stability     BMI Counseling: Body mass index is 49 82 kg/m²  The BMI is above normal  Nutrition recommendations include reducing portion sizes

## 2019-11-25 NOTE — TELEPHONE ENCOUNTER
Call [facility] and tell them to cancel order----remember to make sure correct pharm is in chart--TW sent in

## 2019-12-03 ENCOUNTER — TELEPHONE (OUTPATIENT)
Dept: FAMILY MEDICINE CLINIC | Facility: CLINIC | Age: 54
End: 2019-12-03

## 2019-12-03 DIAGNOSIS — E11.9 TYPE 2 DIABETES MELLITUS WITHOUT COMPLICATION, WITH LONG-TERM CURRENT USE OF INSULIN (HCC): Primary | ICD-10-CM

## 2019-12-03 DIAGNOSIS — Z79.4 TYPE 2 DIABETES MELLITUS WITHOUT COMPLICATION, WITH LONG-TERM CURRENT USE OF INSULIN (HCC): Primary | ICD-10-CM

## 2019-12-11 DIAGNOSIS — E78.2 MIXED HYPERLIPIDEMIA: ICD-10-CM

## 2019-12-11 LAB
A ALTERNATA IGE QN: <0.1 KU/L
A FUMIGATUS IGE QN: <0.1 KU/L
BASOPHILS # BLD AUTO: 0 X10E3/UL (ref 0–0.2)
BASOPHILS NFR BLD AUTO: 1 %
BERMUDA GRASS IGE QN: <0.1 KU/L
BOXELDER IGE QN: <0.1 KU/L
C HERBARUM IGE QN: <0.1 KU/L
CAT DANDER IGE QN: <0.1 KU/L
CMN PIGWEED IGE QN: <0.1 KU/L
COMMON RAGWEED IGE QN: <0.1 KU/L
COTTONWOOD IGE QN: <0.1 KU/L
D FARINAE IGE QN: <0.1 KU/L
D PTERONYSS IGE QN: <0.1 KU/L
DOG DANDER IGE QN: <0.1 KU/L
EOSINOPHIL # BLD AUTO: 0.3 X10E3/UL (ref 0–0.4)
EOSINOPHIL NFR BLD AUTO: 5 %
ERYTHROCYTE [DISTWIDTH] IN BLOOD BY AUTOMATED COUNT: 13.8 % (ref 12.3–15.4)
HCT VFR BLD AUTO: 47.6 % (ref 37.5–51)
HGB BLD-MCNC: 15.9 G/DL (ref 13–17.7)
IGE SERPL-ACNC: 183 IU/ML (ref 6–495)
IMM GRANULOCYTES # BLD: 0 X10E3/UL (ref 0–0.1)
IMM GRANULOCYTES NFR BLD: 1 %
LONDON PLANE IGE QN: <0.1 KU/L
LYMPHOCYTES # BLD AUTO: 1.7 X10E3/UL (ref 0.7–3.1)
LYMPHOCYTES NFR BLD AUTO: 26 %
Lab: NORMAL
MCH RBC QN AUTO: 29.8 PG (ref 26.6–33)
MCHC RBC AUTO-ENTMCNC: 33.4 G/DL (ref 31.5–35.7)
MCV RBC AUTO: 89 FL (ref 79–97)
MONOCYTES # BLD AUTO: 0.7 X10E3/UL (ref 0.1–0.9)
MONOCYTES NFR BLD AUTO: 10 %
MOUSE URINE PROT IGE QN: <0.1 KU/L
MT JUNIPER IGE QN: <0.1 KU/L
MUGWORT IGE QN: <0.1 KU/L
NEUTROPHILS # BLD AUTO: 3.9 X10E3/UL (ref 1.4–7)
NEUTROPHILS NFR BLD AUTO: 57 %
PENICILLIUM CHRYSOGENUM: <0.1 KU/L
PLATELET # BLD AUTO: 168 X10E3/UL (ref 150–450)
RBC # BLD AUTO: 5.33 X10E6/UL (ref 4.14–5.8)
ROACH IGE QN: <0.1 KU/L
SHEEP SORREL IGE QN: <0.1 KU/L
SILVER BIRCH IGE QN: <0.1 KU/L
TIMOTHY IGE QN: <0.1 KU/L
WALNUT IGE: <0.1 KU/L
WBC # BLD AUTO: 6.6 X10E3/UL (ref 3.4–10.8)
WHITE ASH IGE QN: <0.1 KU/L
WHITE ELM IGE QN: <0.1 KU/L
WHITE MULBERRY IGE QN: <0.1 KU/L
WHITE OAK IGE QN: <0.1 KU/L

## 2019-12-11 RX ORDER — ATORVASTATIN CALCIUM 20 MG/1
TABLET, FILM COATED ORAL
Qty: 30 TABLET | Refills: 2 | Status: SHIPPED | OUTPATIENT
Start: 2019-12-11 | End: 2020-03-13

## 2019-12-18 ENCOUNTER — HOSPITAL ENCOUNTER (OUTPATIENT)
Dept: CT IMAGING | Facility: HOSPITAL | Age: 54
Discharge: HOME | End: 2019-12-18
Attending: OTOLARYNGOLOGY
Payer: COMMERCIAL

## 2019-12-18 DIAGNOSIS — J45.51 SEVERE PERSISTENT ALLERGIC ASTHMA WITH ACUTE EXACERBATION: ICD-10-CM

## 2019-12-18 PROCEDURE — 70486 CT MAXILLOFACIAL W/O DYE: CPT

## 2019-12-20 DIAGNOSIS — Z71.89 COMPLEX CARE COORDINATION: Primary | ICD-10-CM

## 2019-12-26 ENCOUNTER — PATIENT OUTREACH (OUTPATIENT)
Dept: CASE MANAGEMENT | Facility: OTHER | Age: 54
End: 2019-12-26

## 2019-12-27 ENCOUNTER — PATIENT OUTREACH (OUTPATIENT)
Dept: CASE MANAGEMENT | Facility: OTHER | Age: 54
End: 2019-12-27

## 2019-12-27 NOTE — PROGRESS NOTES
Patient returned my call  I introduced myself and explained care management  He gave consent  He asked if the CT results were available  I advised him to call Dr Dusty Pritchard ENT  The results are available and the office would be able to advise patient  He was also referred to an allergy and asthma specialist he will see on 1/2  He continues to have "sinus" issues  Will  complete assessment next outreach

## 2020-01-02 PROBLEM — J45.50 SEVERE PERSISTENT ASTHMA WITHOUT COMPLICATION: Status: ACTIVE | Noted: 2020-01-02

## 2020-01-02 PROBLEM — J30.0 VASOMOTOR RHINITIS: Status: ACTIVE | Noted: 2020-01-02

## 2020-01-03 ENCOUNTER — PATIENT OUTREACH (OUTPATIENT)
Dept: CASE MANAGEMENT | Facility: OTHER | Age: 55
End: 2020-01-03

## 2020-01-03 NOTE — PROGRESS NOTES
Outreach attempt to complete assessment  I left a message om patient's voicemail with my contact information

## 2020-01-07 ENCOUNTER — PATIENT OUTREACH (OUTPATIENT)
Dept: CASE MANAGEMENT | Facility: OTHER | Age: 55
End: 2020-01-07

## 2020-01-07 NOTE — LETTER
Date: 01/07/20    Dear Yessi Jiménez,   My name is Cindy Marlin; I am a registered nurse care manager working with [facility]  I have not been able to reach you and would like to set a time that I can talk with you over the phone  My work is to help patients that have complex medical conditions get the care they need  This includes patients who may have been in the hospital or emergency room  Please call me with any questions you may have  I look forward to talking with you    Sincerely,  Calista Fernandez RN  Outpatient Care Manager  Phone: [phone]

## 2020-01-10 DIAGNOSIS — M10.9 GOUT, UNSPECIFIED CAUSE, UNSPECIFIED CHRONICITY, UNSPECIFIED SITE: ICD-10-CM

## 2020-01-10 RX ORDER — ALLOPURINOL 100 MG/1
TABLET ORAL
Qty: 60 TABLET | Refills: 0 | Status: SHIPPED | OUTPATIENT
Start: 2020-01-10 | End: 2020-02-10

## 2020-01-22 ENCOUNTER — PATIENT OUTREACH (OUTPATIENT)
Dept: CASE MANAGEMENT | Facility: OTHER | Age: 55
End: 2020-01-22

## 2020-01-24 DIAGNOSIS — E03.9 ACQUIRED HYPOTHYROIDISM: ICD-10-CM

## 2020-01-24 RX ORDER — LISINOPRIL 40 MG/1
TABLET ORAL
Qty: 90 TABLET | Refills: 0 | Status: SHIPPED | OUTPATIENT
Start: 2020-01-24 | End: 2020-04-20

## 2020-01-29 DIAGNOSIS — E78.01 FAMILIAL HYPERCHOLESTEROLEMIA: Chronic | ICD-10-CM

## 2020-01-29 DIAGNOSIS — E11.9 TYPE 2 DIABETES MELLITUS WITHOUT COMPLICATION, WITHOUT LONG-TERM CURRENT USE OF INSULIN (HCC): Primary | Chronic | ICD-10-CM

## 2020-01-29 DIAGNOSIS — J40 BRONCHITIS: ICD-10-CM

## 2020-01-29 RX ORDER — ALBUTEROL SULFATE 90 UG/1
2 AEROSOL, METERED RESPIRATORY (INHALATION) EVERY 4 HOURS PRN
Qty: 18 G | Refills: 2 | Status: SHIPPED | OUTPATIENT
Start: 2020-01-29 | End: 2022-01-31 | Stop reason: SDUPTHER

## 2020-02-07 DIAGNOSIS — M10.9 GOUT, UNSPECIFIED CAUSE, UNSPECIFIED CHRONICITY, UNSPECIFIED SITE: ICD-10-CM

## 2020-02-07 NOTE — TELEPHONE ENCOUNTER
Called patient & left a message on his voicemail that we were cancelling his consultation on 1/15/19  I told him that he is a direct referral for his injection and Dudley Valdez our  will be calling him with an appt  Detail Level: Zone Plan: Location: scalp \\nPlan: Excision \\n\\nIf patient finds bothersome will excise \\n\\nPatient is to follow up as needed Plan: Location: FACE \\nPrescription: Plexion 9.8 %-4.8 % topical cleanser (Wash face, then lather and let sit for 2-5 minutes, then rinse off completely. Do daily as needed for flare ups.)and Spironolactone 100 mg tablet \\n(Take one tablet daily)\\nPharmacy: [facility] \\n\\nDiscussed with patient that this is an immune mediated condition triggered with stress, lack of sleep, and also has a major genetic component\\nEducated patient on Accutane 6 month treatment course, side effects, and iPledge program/requirements (2 negative pregnancy tests 30 days apart required)\\nCommon side effects from therapy: dryness, joint pain, mood changes, headaches, nose bleeds\\nDiscussed with patient that Accutane is a Vitamin A derivative\\nDiscussed with patient that Accutane obliterates oil glands and a cure for acne vs. antibiotics which is a temporary treatment\\nDiscussed with patient that medication is processed by the liver and requires blood work to monitor liver functions\\n\\n\\nDiscussed with pt that we will prescribe Plexion Wash (2-5 minutes) daily to help reduce inflammation.\\nDiscussed with her that she has adult female acne that occurs when there is a fluctuation of hormones\\nWe will start her on Spironolactone that blocks the hormonal receptors at the skin and hair and works to decrease the inflammatory acne\\n---Educated to not take while pregnant and to watch for orthostatic hypotension which can develop if there is too much diuretic effect\\n\\nPatient is to follow up in 6 weeks

## 2020-02-10 RX ORDER — ALLOPURINOL 100 MG/1
TABLET ORAL
Qty: 60 TABLET | Refills: 0 | Status: SHIPPED | OUTPATIENT
Start: 2020-02-10 | End: 2020-03-11

## 2020-02-11 DIAGNOSIS — G25.81 RESTLESS LEG: ICD-10-CM

## 2020-02-11 RX ORDER — ROPINIROLE 2 MG/1
2 TABLET, FILM COATED ORAL
Qty: 90 TABLET | Refills: 0 | Status: SHIPPED | OUTPATIENT
Start: 2020-02-11 | End: 2020-05-07

## 2020-02-16 LAB
ALBUMIN SERPL-MCNC: 4.4 G/DL (ref 3.8–4.9)
ALBUMIN/GLOB SERPL: 1.7 {RATIO} (ref 1.2–2.2)
ALP SERPL-CCNC: 111 IU/L (ref 39–117)
ALT SERPL-CCNC: 27 IU/L (ref 0–44)
AST SERPL-CCNC: 15 IU/L (ref 0–40)
BILIRUB SERPL-MCNC: 0.5 MG/DL (ref 0–1.2)
BUN SERPL-MCNC: 15 MG/DL (ref 6–24)
BUN/CREAT SERPL: 19 (ref 9–20)
CALCIUM SERPL-MCNC: 9.4 MG/DL (ref 8.7–10.2)
CHLORIDE SERPL-SCNC: 96 MMOL/L (ref 96–106)
CHOLEST SERPL-MCNC: 175 MG/DL (ref 100–199)
CHOLEST/HDLC SERPL: 3.7 RATIO (ref 0–5)
CO2 SERPL-SCNC: 29 MMOL/L (ref 20–29)
CREAT SERPL-MCNC: 0.77 MG/DL (ref 0.76–1.27)
EST. AVERAGE GLUCOSE BLD GHB EST-MCNC: 289 MG/DL
GLOBULIN SER-MCNC: 2.6 G/DL (ref 1.5–4.5)
GLUCOSE SERPL-MCNC: 206 MG/DL (ref 65–99)
HBA1C MFR BLD: 11.7 % (ref 4.8–5.6)
HDLC SERPL-MCNC: 47 MG/DL
LDLC SERPL CALC-MCNC: 89 MG/DL (ref 0–99)
POTASSIUM SERPL-SCNC: 4 MMOL/L (ref 3.5–5.2)
PROT SERPL-MCNC: 7 G/DL (ref 6–8.5)
SL AMB EGFR AFRICAN AMERICAN: 117 ML/MIN/1.73
SL AMB EGFR NON AFRICAN AMERICAN: 101 ML/MIN/1.73
SL AMB VLDL CHOLESTEROL CALC: 39 MG/DL (ref 5–40)
SODIUM SERPL-SCNC: 139 MMOL/L (ref 134–144)
TRIGL SERPL-MCNC: 196 MG/DL (ref 0–149)

## 2020-02-16 PROCEDURE — 3046F HEMOGLOBIN A1C LEVEL >9.0%: CPT | Performed by: FAMILY MEDICINE

## 2020-02-17 ENCOUNTER — TELEPHONE (OUTPATIENT)
Dept: FAMILY MEDICINE CLINIC | Facility: CLINIC | Age: 55
End: 2020-02-17

## 2020-02-17 NOTE — TELEPHONE ENCOUNTER
----- Message from Brooklynn Hurtado MD sent at 2/17/2020  5:09 AM EST -----  MM/LABS 6mos---A1c out of control---needs appt

## 2020-02-21 ENCOUNTER — TELEPHONE (OUTPATIENT)
Dept: PULMONOLOGY | Facility: CLINIC | Age: 55
End: 2020-02-21

## 2020-02-21 ENCOUNTER — OFFICE VISIT (OUTPATIENT)
Dept: FAMILY MEDICINE CLINIC | Facility: CLINIC | Age: 55
End: 2020-02-21
Payer: COMMERCIAL

## 2020-02-21 DIAGNOSIS — G47.33 OBSTRUCTIVE SLEEP APNEA ON CPAP: ICD-10-CM

## 2020-02-21 DIAGNOSIS — I10 ESSENTIAL HYPERTENSION: Chronic | ICD-10-CM

## 2020-02-21 DIAGNOSIS — E11.9 TYPE 2 DIABETES MELLITUS WITHOUT COMPLICATION, WITH LONG-TERM CURRENT USE OF INSULIN (HCC): Primary | Chronic | ICD-10-CM

## 2020-02-21 DIAGNOSIS — Z99.89 OBSTRUCTIVE SLEEP APNEA ON CPAP: ICD-10-CM

## 2020-02-21 DIAGNOSIS — Z00.00 WELLNESS EXAMINATION: ICD-10-CM

## 2020-02-21 DIAGNOSIS — Z79.4 TYPE 2 DIABETES MELLITUS WITHOUT COMPLICATION, WITH LONG-TERM CURRENT USE OF INSULIN (HCC): Primary | Chronic | ICD-10-CM

## 2020-02-21 PROCEDURE — 3008F BODY MASS INDEX DOCD: CPT | Performed by: FAMILY MEDICINE

## 2020-02-21 PROCEDURE — 1036F TOBACCO NON-USER: CPT | Performed by: FAMILY MEDICINE

## 2020-02-21 PROCEDURE — 99214 OFFICE O/P EST MOD 30 MIN: CPT | Performed by: FAMILY MEDICINE

## 2020-02-21 PROCEDURE — 99396 PREV VISIT EST AGE 40-64: CPT | Performed by: FAMILY MEDICINE

## 2020-02-21 PROCEDURE — 3079F DIAST BP 80-89 MM HG: CPT | Performed by: FAMILY MEDICINE

## 2020-02-21 PROCEDURE — 3075F SYST BP GE 130 - 139MM HG: CPT | Performed by: FAMILY MEDICINE

## 2020-02-21 NOTE — TELEPHONE ENCOUNTER
Patient calling asking if we received paperwork regarding a shot he is going to get  He does not know what the shot is called  He said he mailed us paperwork a month ago  He is unsure what address it was mailed to  He thinks he is getting this "shot" on Tuesday  I do not see any paperwork in his chart

## 2020-02-22 VITALS
OXYGEN SATURATION: 90 % | SYSTOLIC BLOOD PRESSURE: 138 MMHG | DIASTOLIC BLOOD PRESSURE: 88 MMHG | WEIGHT: 315 LBS | HEART RATE: 111 BPM | BODY MASS INDEX: 44.1 KG/M2 | HEIGHT: 71 IN

## 2020-02-22 NOTE — PROGRESS NOTES
HPI:  Layla Danielle is a 63 y o  male here for his yearly health maintenance exam    Patient Active Problem List   Diagnosis    Hyponatremia    Diabetes mellitus, type 2 [facility] )    Hyperglycemia due to type 2 [facility] mellitus ([facility]    Weakness generalized    Hypokalemia    Hypertension    Hyperlipidemia    Chronic pain syndrome    Severe persistent allergic asthma with acute exacerbation    Obstructive sleep apnea on CPAP    Severe persistent asthma without complication    Vasomotor rhinitis     Past Medical History:   Diagnosis Date    Asthma     Chronic pain     Diabetes mellitus ([facility] )     Gout     Hyperlipidemia     Hypertension     Impaired fasting glucose     last assessed: 12/16/2015    Knee arthropathy     last assessed: 3/23/2016       1  Advanced Directive: n     2  Durable Power of  for Healthcare: n     3  Social History:           Drug and alcohol History: n                  4  Immunizations up to date: y                 Lifestyle:                           Healthy Diet:y                          Alcohol Use:y                          Tobacco Use:n                          Regular exercise:y                          Weight concerns:y                               5  Over the past 2 weeks, how often have you been bothered by the following:              Little interest or pleasure in doing things:n              Felling down, depressed or hopeless:n       Current Outpatient Medications   Medication Sig Dispense Refill    albuterol (PROVENTIL HFA,VENTOLIN HFA) 90 mcg/act inhaler Inhale 2 puffs every 4 (four) hours as needed for wheezing 18 g 2    allopurinol (ZYLOPRIM) 100 mg tablet take 2 tablets by mouth daily 60 tablet 0    atorvastatin (LIPITOR) 20 mg tablet TAKE 1 TABLET BY MOUTH EVERY DAY 30 tablet 2    budesonide-formoterol (SYMBICORT) 160-4 5 mcg/act inhaler Inhale 2 puffs 2 (two) times a day Rinse mouth after use   1 Inhaler 5    cetirizine (ZyrTEC) 10 mg tablet Take 10 mg by mouth daily      Cyanocobalamin (VITAMIN B 12 PO) Take by mouth      Dapagliflozin Propanediol (Farxiga) 5 MG TABS Take 1 tablet (5 mg total) by mouth daily 30 tablet 5    fluticasone (FLONASE) 50 mcg/act nasal spray 2 sprays into each nostril daily (Patient not taking: Reported on 8/29/2019) 48 each 11    gabapentin (NEURONTIN) 100 mg capsule Take by mouth      hylan (SYNVISC) injection Inject into the joint      indomethacin (INDOCIN) 25 mg capsule Take by mouth every 8 (eight) hours      insulin degludec (TRESIBA FLEXTOUCH) 100 units/mL injection pen Inject 75 Units under the skin daily 5 pen 10    ipratropium-albuterol (DUO-NEB) 0 5-2 5 mg/3 mL nebulizer solution Take 1 vial (3 mL total) by nebulization 4 (four) times a day 120 vial 6    lisinopril (ZESTRIL) 40 mg tablet TAKE 1 TABLET BY MOUTH EVERY DAY 90 tablet 0    Magnesium 500 MG TABS Take by mouth      metFORMIN (GLUCOPHAGE) 1000 MG tablet Take 1 tablet (1,000 mg total) by mouth 2 (two) times a day with meals 180 tablet 1    montelukast (SINGULAIR) 10 mg tablet Take 1 tablet (10 mg total) by mouth daily at bedtime 90 each 11    Multiple Vitamin (MULTI-VITAMIN DAILY PO) Take 1 tablet by mouth daily      oxyCODONE-acetaminophen (PERCOCET) 5-325 mg per tablet Take 1 tablet by mouth every 4 (four) hours as needed for moderate painMax Daily Amount: 6 tablets 30 tablet 0    oxyCODONE-acetaminophen (PERCOCET) 5-325 mg per tablet Take 1 tablet by mouth every 4 (four) hours as needed for moderate painMax Daily Amount: 6 tablets 30 tablet 0    pioglitazone (ACTOS) 30 mg tablet TAKE 1 TABLET BY MOUTH EVERY DAY (Patient not taking: Reported on 10/30/2019) 1 tablet 0    potassium chloride (K-DUR,KLOR-CON) 10 mEq tablet Take 1 tablet by mouth 2 (two) times a day 60 tablet 0    rOPINIRole (REQUIP) 2 mg tablet TAKE 1 TABLET (2 MG TOTAL) BY MOUTH DAILY AT BEDTIME 90 tablet 0     No current facility-administered medications for this visit  Allergies   Allergen Reactions    Other Cough and Nasal Congestion     Seasonal allergies      Immunization History   Administered Date(s) Administered    INFLUENZA 09/03/2014, 12/16/2015, 09/02/2016    Influenza TIV (IM) 09/03/2014, 09/02/2016, 08/30/2017    Influenza, recombinant, quadrivalent,injectable, preservative free 10/30/2019    Pneumococcal Conjugate 13-Valent 08/30/2017    Pneumococcal Polysaccharide PPV23 08/12/2013    Tdap 07/28/2018       Patient Care Team:  Willetta Spatz, MD as PCP - MD Amaury Toussaint MD    Review of Systems   Constitutional: Negative for fatigue, fever and unexpected weight change  HENT: Negative for congestion, sinus pressure and sore throat  Eyes: Negative for visual disturbance  Respiratory: Negative for shortness of breath and wheezing  Cardiovascular: Negative for chest pain and palpitations  Gastrointestinal: Negative for abdominal pain, diarrhea, nausea and vomiting  Physical Exam :  Physical Exam   Constitutional: He appears well-developed and well-nourished  No distress  HENT:   Right Ear: Tympanic membrane, external ear and ear canal normal  Tympanic membrane is not injected  Left Ear: Tympanic membrane, external ear and ear canal normal  Tympanic membrane is not injected  Nose: Nose normal    Mouth/Throat: Uvula is midline, oropharynx is clear and moist and mucous membranes are normal    Eyes: Pupils are equal, round, and reactive to light  Conjunctivae are normal    Neck: Normal range of motion  Neck supple  No thyromegaly present  Cardiovascular: Normal rate, regular rhythm and normal heart sounds  No murmur heard  Pulmonary/Chest: Effort normal and breath sounds normal  No respiratory distress  He has no wheezes  Lymphadenopathy:     He has no cervical adenopathy  Skin: He is not diaphoretic  Assessment and Plan:  1   Type 2 diabetes mellitus without complication, with long-term current use of insulin (HCC)  Dapagliflozin Propanediol (Farxiga) 5 MG TABS   2  Obstructive sleep apnea on CPAP     3  Essential hypertension     4   Wellness examination         Health Maintenance Due   Topic Date Due    DM Eye Exam  11/06/2019    Annual Physical  01/17/2020

## 2020-02-22 NOTE — PROGRESS NOTES
[facility]        NAME: Elvis Bacon is a 63 y o  male  : 1965    MRN: [id number]  DATE: 2020  TIME: 6:59 AM    Assessment and Plan   Type 2 diabetes mellitus without complication, with long-term current use of insulin (HCC) [E11 9, Z79 4]  1  Type 2 diabetes mellitus without complication, with long-term current use of insulin ([facility])  Dapagliflozin Propanediol (Farxiga) 5 MG TABS   2  Obstructive sleep apnea on CPAP     3  Essential hypertension     4  Wellness examination           Patient Instructions     Patient Instructions   O1o==05 7----taking 75 units basal insulin/metformin----will add Farxiga or SGL2 of insur choice--diary--review in 30 days          Chief Complaint     Chief Complaint   Patient presents with    Follow-up     Dm-MM--a1c elevated    Annual Exam     HM         History of Present Illness       F/u for assessed med cond--A1c=11 7---      Review of Systems   Review of Systems   Constitutional: Negative for fatigue, fever and unexpected weight change  HENT: Negative for congestion, sinus pain and sore throat  Eyes: Negative for visual disturbance  Respiratory: Negative for shortness of breath and wheezing  Cardiovascular: Negative for chest pain and palpitations  Gastrointestinal: Negative for abdominal pain, nausea and vomiting  Musculoskeletal: Negative  Negative for arthralgias and myalgias  Neurological: Negative for syncope, weakness and numbness  Psychiatric/Behavioral: Negative  Negative for confusion, dysphoric mood and suicidal ideas           Current Medications       Current Outpatient Medications:     albuterol (PROVENTIL HFA,VENTOLIN HFA) 90 mcg/act inhaler, Inhale 2 puffs every 4 (four) hours as needed for wheezing, Disp: 18 g, Rfl: 2    allopurinol (ZYLOPRIM) 100 mg tablet, take 2 tablets by mouth daily, Disp: 60 tablet, Rfl: 0    atorvastatin (LIPITOR) 20 mg tablet, TAKE 1 TABLET BY MOUTH EVERY DAY, Disp: 30 tablet, Rfl: 2    budesonide-formoterol (SYMBICORT) 160-4 5 mcg/act inhaler, Inhale 2 puffs 2 (two) times a day Rinse mouth after use , Disp: 1 Inhaler, Rfl: 5    cetirizine (ZyrTEC) 10 mg tablet, Take 10 mg by mouth daily, Disp: , Rfl:     Cyanocobalamin (VITAMIN B 12 PO), Take by mouth, Disp: , Rfl:     Dapagliflozin Propanediol (Farxiga) 5 MG TABS, Take 1 tablet (5 mg total) by mouth daily, Disp: 30 tablet, Rfl: 5    fluticasone (FLONASE) 50 mcg/act nasal spray, 2 sprays into each nostril daily (Patient not taking: Reported on 8/29/2019), Disp: 48 each, Rfl: 11    gabapentin (NEURONTIN) 100 mg capsule, Take by mouth, Disp: , Rfl:     hylan (SYNVISC) injection, Inject into the joint, Disp: , Rfl:     indomethacin (INDOCIN) 25 mg capsule, Take by mouth every 8 (eight) hours, Disp: , Rfl:     insulin degludec (TRESIBA FLEXTOUCH) 100 units/mL injection pen, Inject 75 Units under the skin daily, Disp: 5 pen, Rfl: 10    ipratropium-albuterol (DUO-NEB) 0 5-2 5 mg/3 mL nebulizer solution, Take 1 vial (3 mL total) by nebulization 4 (four) times a day, Disp: 120 vial, Rfl: 6    lisinopril (ZESTRIL) 40 mg tablet, TAKE 1 TABLET BY MOUTH EVERY DAY, Disp: 90 tablet, Rfl: 0    Magnesium 500 MG TABS, Take by mouth, Disp: , Rfl:     metFORMIN (GLUCOPHAGE) 1000 MG tablet, Take 1 tablet (1,000 mg total) by mouth 2 (two) times a day with meals, Disp: 180 tablet, Rfl: 1    montelukast (SINGULAIR) 10 mg tablet, Take 1 tablet (10 mg total) by mouth daily at bedtime, Disp: 90 each, Rfl: 11    Multiple Vitamin (MULTI-VITAMIN DAILY PO), Take 1 tablet by mouth daily, Disp: , Rfl:     oxyCODONE-acetaminophen (PERCOCET) 5-325 mg per tablet, Take 1 tablet by mouth every 4 (four) hours as needed for moderate painMax Daily Amount: 6 tablets, Disp: 30 tablet, Rfl: 0    oxyCODONE-acetaminophen (PERCOCET) 5-325 mg per tablet, Take 1 tablet by mouth every 4 (four) hours as needed for moderate painMax Daily Amount: 6 tablets, Disp: 30 tablet, Rfl: 0    pioglitazone (ACTOS) 30 mg tablet, TAKE 1 TABLET BY MOUTH EVERY DAY (Patient not taking: Reported on 10/30/2019), Disp: 1 tablet, Rfl: 0    potassium chloride (K-DUR,KLOR-CON) 10 mEq tablet, Take 1 tablet by mouth 2 (two) times a day, Disp: 60 tablet, Rfl: 0    rOPINIRole (REQUIP) 2 mg tablet, TAKE 1 TABLET (2 MG TOTAL) BY MOUTH DAILY AT BEDTIME, Disp: 90 tablet, Rfl: 0    Current Allergies     Allergies as of 02/21/2020 - Reviewed 02/21/2020   Allergen Reaction Noted    Other Cough and Nasal Congestion 08/16/2019            The following portions of the patient's history were reviewed and updated as appropriate: allergies, current medications, past family history, past medical history, past social history, past surgical history and problem list      Past Medical History:   Diagnosis Date    Asthma     Chronic pain     Diabetes mellitus [address] [address] )     Gout     Hyperlipidemia     Hypertension     Impaired fasting glucose     last assessed: 12/16/2015    Knee arthropathy     last assessed: 3/23/2016       Past Surgical History:   Procedure Laterality Date    KNEE ARTHROSCOPY Bilateral     KNEE SURGERY         Family History   Problem Relation Age of Onset    Coronary artery disease Father     Cancer Other         malignant neoplasm    Asthma Brother     No Known Problems Daughter     Asthma Mother     COPD Mother     Asthma Sister     No Known Problems Brother     Asthma Son     No Known Problems Son          Medications have been verified  Objective   /88   Pulse (!) 111   Ht 5' 11" (1 803 m)   Wt (!) 155 kg (342 lb)   SpO2 90%   BMI 47 70 kg/m²        Physical Exam     Physical Exam   Constitutional: He is oriented to person, place, and time  Vital signs are normal  He appears well-developed and well-nourished  HENT:   Right Ear: Ear canal normal  Tympanic membrane is not injected  Left Ear: Ear canal normal  Tympanic membrane is not injected     Nose: Nose normal    Mouth/Throat: Oropharynx is clear and moist    Eyes: Pupils are equal, round, and reactive to light  Conjunctivae and EOM are normal  Right eye exhibits no discharge  Left eye exhibits no discharge  Neck: Normal range of motion  Neck supple  No thyromegaly present  Cardiovascular: Normal rate, regular rhythm and normal heart sounds  No murmur heard  Pulmonary/Chest: Effort normal and breath sounds normal  No respiratory distress  He has no wheezes  Abdominal: Soft  Bowel sounds are normal  He exhibits no distension  There is no tenderness  Musculoskeletal: Normal range of motion  Lymphadenopathy:     He has no cervical adenopathy  Neurological: He is alert and oriented to person, place, and time  He has normal strength and normal reflexes  He is not disoriented  No sensory deficit  Gait normal    Skin: Skin is warm and dry  Psychiatric: He has a normal mood and affect  His speech is normal and behavior is normal  Judgment and thought content normal  Cognition and memory are normal      BMI Counseling: Body mass index is 47 7 kg/m²  The BMI is above normal  Nutrition recommendations include reducing portion sizes

## 2020-02-22 NOTE — PATIENT INSTRUCTIONS
K7d==00 7----taking 75 units basal insulin/metformin----will add Farxiga or SGL2 of insur choice--diary--review in 30 days

## 2020-02-23 ENCOUNTER — TELEPHONE (OUTPATIENT)
Dept: OTHER | Facility: OTHER | Age: 55
End: 2020-02-23

## 2020-02-23 DIAGNOSIS — E11.9 TYPE 2 DIABETES MELLITUS WITHOUT COMPLICATION, WITHOUT LONG-TERM CURRENT USE OF INSULIN (HCC): Primary | ICD-10-CM

## 2020-02-23 NOTE — TELEPHONE ENCOUNTER
Pharmacy called in to state Oksana Spann is not covered by [employer] either, nvokana or jardiance would be

## 2020-02-25 NOTE — TELEPHONE ENCOUNTER
Debbie Aguilera can you please call the patient and let him know that he is talking about Dupixent for his asthma  He has been working with allergy and immunology  The paperwork was sent to our office, but because she is the ordering physician we needed to fax the paperwork to their office  He needs to call his allergist for further details

## 2020-02-26 NOTE — TELEPHONE ENCOUNTER
Called Allergy office and spoke with Jessica Rey,  She stated that Pt has an order for IGE to be done since October and they need that blood work done  She stated that they are handling this order    I called pt and notified him to please have blood work done and that the Allergy doctor's office will be the one managing this medication for him

## 2020-02-29 LAB
LEFT EYE DIABETIC RETINOPATHY: NORMAL
RIGHT EYE DIABETIC RETINOPATHY: NORMAL

## 2020-03-11 DIAGNOSIS — M10.9 GOUT, UNSPECIFIED CAUSE, UNSPECIFIED CHRONICITY, UNSPECIFIED SITE: ICD-10-CM

## 2020-03-11 RX ORDER — ALLOPURINOL 100 MG/1
TABLET ORAL
Qty: 60 TABLET | Refills: 0 | Status: SHIPPED | OUTPATIENT
Start: 2020-03-11 | End: 2020-04-13

## 2020-03-13 DIAGNOSIS — E78.2 MIXED HYPERLIPIDEMIA: ICD-10-CM

## 2020-03-13 RX ORDER — ATORVASTATIN CALCIUM 20 MG/1
TABLET, FILM COATED ORAL
Qty: 90 TABLET | Refills: 0 | Status: SHIPPED | OUTPATIENT
Start: 2020-03-13 | End: 2020-06-23

## 2020-04-05 DIAGNOSIS — J40 BRONCHITIS: ICD-10-CM

## 2020-04-06 RX ORDER — BUDESONIDE AND FORMOTEROL FUMARATE DIHYDRATE 160; 4.5 UG/1; UG/1
AEROSOL RESPIRATORY (INHALATION)
Qty: 30.6 INHALER | Refills: 3 | Status: SHIPPED | OUTPATIENT
Start: 2020-04-06 | End: 2021-05-12 | Stop reason: SDUPTHER

## 2020-04-12 DIAGNOSIS — M10.9 GOUT, UNSPECIFIED CAUSE, UNSPECIFIED CHRONICITY, UNSPECIFIED SITE: ICD-10-CM

## 2020-04-13 RX ORDER — ALLOPURINOL 100 MG/1
TABLET ORAL
Qty: 60 TABLET | Refills: 0 | Status: SHIPPED | OUTPATIENT
Start: 2020-04-13 | End: 2020-04-20

## 2020-04-19 DIAGNOSIS — M10.9 GOUT, UNSPECIFIED CAUSE, UNSPECIFIED CHRONICITY, UNSPECIFIED SITE: ICD-10-CM

## 2020-04-20 DIAGNOSIS — E03.9 ACQUIRED HYPOTHYROIDISM: ICD-10-CM

## 2020-04-20 RX ORDER — ALLOPURINOL 100 MG/1
TABLET ORAL
Qty: 60 TABLET | Refills: 0 | Status: SHIPPED | OUTPATIENT
Start: 2020-05-14 | End: 2020-06-23

## 2020-04-20 RX ORDER — LISINOPRIL 40 MG/1
TABLET ORAL
Qty: 90 TABLET | Refills: 0 | Status: SHIPPED | OUTPATIENT
Start: 2020-04-20 | End: 2020-07-27

## 2020-05-06 DIAGNOSIS — G25.81 RESTLESS LEG: ICD-10-CM

## 2020-05-07 RX ORDER — ROPINIROLE 2 MG/1
2 TABLET, FILM COATED ORAL
Qty: 90 TABLET | Refills: 0 | Status: SHIPPED | OUTPATIENT
Start: 2020-05-07 | End: 2020-07-23

## 2020-06-23 DIAGNOSIS — M10.9 GOUT, UNSPECIFIED CAUSE, UNSPECIFIED CHRONICITY, UNSPECIFIED SITE: ICD-10-CM

## 2020-06-23 DIAGNOSIS — E11.9 TYPE 2 DIABETES MELLITUS WITHOUT COMPLICATION, WITHOUT LONG-TERM CURRENT USE OF INSULIN (HCC): ICD-10-CM

## 2020-06-23 DIAGNOSIS — E78.2 MIXED HYPERLIPIDEMIA: ICD-10-CM

## 2020-06-23 RX ORDER — EMPAGLIFLOZIN 25 MG/1
TABLET, FILM COATED ORAL
Qty: 90 TABLET | Refills: 1 | Status: SHIPPED | OUTPATIENT
Start: 2020-06-23 | End: 2020-12-18

## 2020-06-23 RX ORDER — ATORVASTATIN CALCIUM 20 MG/1
TABLET, FILM COATED ORAL
Qty: 90 TABLET | Refills: 0 | Status: SHIPPED | OUTPATIENT
Start: 2020-06-23 | End: 2020-09-14

## 2020-06-23 RX ORDER — ALLOPURINOL 100 MG/1
TABLET ORAL
Qty: 60 TABLET | Refills: 0 | Status: SHIPPED | OUTPATIENT
Start: 2020-06-23 | End: 2020-07-30

## 2020-07-01 DIAGNOSIS — G25.81 RESTLESS LEG: ICD-10-CM

## 2020-07-07 ENCOUNTER — TELEPHONE (OUTPATIENT)
Dept: PULMONOLOGY | Facility: CLINIC | Age: 55
End: 2020-07-07

## 2020-07-07 DIAGNOSIS — J45.50 SEVERE PERSISTENT EXTRINSIC ASTHMA WITHOUT COMPLICATION: ICD-10-CM

## 2020-07-07 RX ORDER — MONTELUKAST SODIUM 10 MG/1
10 TABLET ORAL
Qty: 30 TABLET | Refills: 0 | Status: SHIPPED | OUTPATIENT
Start: 2020-07-07

## 2020-07-07 NOTE — TELEPHONE ENCOUNTER
Received fax from pharmacy for patients Singulair  Patient was due in November 2019 for a follow up  I have called him twice now and left a message to call us back to set up an appt  I will send in a months supply

## 2020-07-20 DIAGNOSIS — G25.81 RESTLESS LEG: ICD-10-CM

## 2020-07-21 DIAGNOSIS — J45.50 SEVERE PERSISTENT EXTRINSIC ASTHMA WITHOUT COMPLICATION: ICD-10-CM

## 2020-07-21 RX ORDER — MONTELUKAST SODIUM 10 MG/1
TABLET ORAL
Qty: 90 TABLET | Refills: 1 | OUTPATIENT
Start: 2020-07-21

## 2020-07-23 RX ORDER — ROPINIROLE 2 MG/1
2 TABLET, FILM COATED ORAL
Qty: 90 TABLET | Refills: 0 | Status: SHIPPED | OUTPATIENT
Start: 2020-07-23 | End: 2020-09-21

## 2020-07-26 DIAGNOSIS — E03.9 ACQUIRED HYPOTHYROIDISM: ICD-10-CM

## 2020-07-27 PROCEDURE — 4010F ACE/ARB THERAPY RXD/TAKEN: CPT | Performed by: FAMILY MEDICINE

## 2020-07-27 RX ORDER — LISINOPRIL 40 MG/1
TABLET ORAL
Qty: 90 TABLET | Refills: 0 | Status: SHIPPED | OUTPATIENT
Start: 2020-07-27 | End: 2020-09-21

## 2020-07-30 DIAGNOSIS — M10.9 GOUT, UNSPECIFIED CAUSE, UNSPECIFIED CHRONICITY, UNSPECIFIED SITE: ICD-10-CM

## 2020-07-30 RX ORDER — ALLOPURINOL 100 MG/1
TABLET ORAL
Qty: 60 TABLET | Refills: 0 | Status: SHIPPED | OUTPATIENT
Start: 2020-07-30 | End: 2020-09-03

## 2020-09-03 DIAGNOSIS — M10.9 GOUT, UNSPECIFIED CAUSE, UNSPECIFIED CHRONICITY, UNSPECIFIED SITE: ICD-10-CM

## 2020-09-04 RX ORDER — ALLOPURINOL 100 MG/1
TABLET ORAL
Qty: 60 TABLET | Refills: 5 | Status: SHIPPED | OUTPATIENT
Start: 2020-09-04 | End: 2021-09-09

## 2020-09-14 DIAGNOSIS — E11.9 TYPE 2 DIABETES MELLITUS WITHOUT COMPLICATION, WITH LONG-TERM CURRENT USE OF INSULIN (HCC): Primary | Chronic | ICD-10-CM

## 2020-09-14 DIAGNOSIS — E78.01 FAMILIAL HYPERCHOLESTEROLEMIA: Chronic | ICD-10-CM

## 2020-09-14 DIAGNOSIS — M10.9 GOUT, UNSPECIFIED CAUSE, UNSPECIFIED CHRONICITY, UNSPECIFIED SITE: ICD-10-CM

## 2020-09-14 DIAGNOSIS — Z12.5 SCREENING PSA (PROSTATE SPECIFIC ANTIGEN): ICD-10-CM

## 2020-09-14 DIAGNOSIS — Z79.4 TYPE 2 DIABETES MELLITUS WITHOUT COMPLICATION, WITH LONG-TERM CURRENT USE OF INSULIN (HCC): Primary | Chronic | ICD-10-CM

## 2020-09-14 DIAGNOSIS — E78.2 MIXED HYPERLIPIDEMIA: ICD-10-CM

## 2020-09-14 RX ORDER — ATORVASTATIN CALCIUM 20 MG/1
TABLET, FILM COATED ORAL
Qty: 90 TABLET | Refills: 0 | Status: SHIPPED | OUTPATIENT
Start: 2020-09-14 | End: 2021-04-11

## 2020-09-20 DIAGNOSIS — G25.81 RESTLESS LEG: ICD-10-CM

## 2020-09-20 DIAGNOSIS — E03.9 ACQUIRED HYPOTHYROIDISM: ICD-10-CM

## 2020-09-20 LAB
ALBUMIN SERPL-MCNC: 4.3 G/DL (ref 3.8–4.9)
ALBUMIN/GLOB SERPL: 1.4 {RATIO} (ref 1.2–2.2)
ALP SERPL-CCNC: 108 IU/L (ref 39–117)
ALT SERPL-CCNC: 27 IU/L (ref 0–44)
AST SERPL-CCNC: 17 IU/L (ref 0–40)
BILIRUB SERPL-MCNC: 0.4 MG/DL (ref 0–1.2)
BUN SERPL-MCNC: 16 MG/DL (ref 6–24)
BUN/CREAT SERPL: 21 (ref 9–20)
CALCIUM SERPL-MCNC: 9.6 MG/DL (ref 8.7–10.2)
CHLORIDE SERPL-SCNC: 100 MMOL/L (ref 96–106)
CHOLEST SERPL-MCNC: 164 MG/DL (ref 100–199)
CHOLEST/HDLC SERPL: 3.6 RATIO (ref 0–5)
CO2 SERPL-SCNC: 31 MMOL/L (ref 20–29)
CREAT SERPL-MCNC: 0.78 MG/DL (ref 0.76–1.27)
EST. AVERAGE GLUCOSE BLD GHB EST-MCNC: 154 MG/DL
GLOBULIN SER-MCNC: 3 G/DL (ref 1.5–4.5)
GLUCOSE SERPL-MCNC: 149 MG/DL (ref 65–99)
HBA1C MFR BLD: 7 % (ref 4.8–5.6)
HDLC SERPL-MCNC: 46 MG/DL
LDLC SERPL CALC-MCNC: 81 MG/DL (ref 0–99)
POTASSIUM SERPL-SCNC: 4.4 MMOL/L (ref 3.5–5.2)
PROT SERPL-MCNC: 7.3 G/DL (ref 6–8.5)
PSA SERPL-MCNC: 0.6 NG/ML (ref 0–4)
SL AMB EGFR AFRICAN AMERICAN: 117 ML/MIN/1.73
SL AMB EGFR NON AFRICAN AMERICAN: 101 ML/MIN/1.73
SL AMB REFLEX CRITERIA: NORMAL
SL AMB VLDL CHOLESTEROL CALC: 37 MG/DL (ref 5–40)
SODIUM SERPL-SCNC: 142 MMOL/L (ref 134–144)
TRIGL SERPL-MCNC: 223 MG/DL (ref 0–149)
URATE SERPL-MCNC: 4.1 MG/DL (ref 3.7–8.6)

## 2020-09-20 PROCEDURE — 3051F HG A1C>EQUAL 7.0%<8.0%: CPT | Performed by: FAMILY MEDICINE

## 2020-09-21 ENCOUNTER — TELEPHONE (OUTPATIENT)
Dept: FAMILY MEDICINE CLINIC | Facility: CLINIC | Age: 55
End: 2020-09-21

## 2020-09-21 RX ORDER — ROPINIROLE 2 MG/1
2 TABLET, FILM COATED ORAL
Qty: 90 TABLET | Refills: 0 | Status: SHIPPED | OUTPATIENT
Start: 2020-10-17 | End: 2020-09-30 | Stop reason: SDUPTHER

## 2020-09-21 RX ORDER — LISINOPRIL 40 MG/1
TABLET ORAL
Qty: 90 TABLET | Refills: 0 | Status: SHIPPED | OUTPATIENT
Start: 2020-10-21 | End: 2020-11-09

## 2020-09-21 NOTE — TELEPHONE ENCOUNTER
----- Message from Zainab Quintanilla MD sent at 9/21/2020  6:24 AM EDT -----  MM/LABS 6mos---current labs good

## 2020-09-30 ENCOUNTER — OFFICE VISIT (OUTPATIENT)
Dept: FAMILY MEDICINE CLINIC | Facility: CLINIC | Age: 55
End: 2020-09-30
Payer: COMMERCIAL

## 2020-09-30 VITALS
HEART RATE: 88 BPM | HEIGHT: 71 IN | BODY MASS INDEX: 44.1 KG/M2 | SYSTOLIC BLOOD PRESSURE: 138 MMHG | WEIGHT: 315 LBS | DIASTOLIC BLOOD PRESSURE: 84 MMHG | OXYGEN SATURATION: 92 % | TEMPERATURE: 96.8 F

## 2020-09-30 DIAGNOSIS — J45.51 SEVERE PERSISTENT ALLERGIC ASTHMA WITH ACUTE EXACERBATION: ICD-10-CM

## 2020-09-30 DIAGNOSIS — E78.01 FAMILIAL HYPERCHOLESTEROLEMIA: Chronic | ICD-10-CM

## 2020-09-30 DIAGNOSIS — G25.81 RESTLESS LEG: ICD-10-CM

## 2020-09-30 DIAGNOSIS — Z23 NEED FOR VACCINATION: ICD-10-CM

## 2020-09-30 DIAGNOSIS — E11.9 TYPE 2 DIABETES MELLITUS WITHOUT COMPLICATION, WITH LONG-TERM CURRENT USE OF INSULIN (HCC): Primary | Chronic | ICD-10-CM

## 2020-09-30 DIAGNOSIS — G47.33 OBSTRUCTIVE SLEEP APNEA ON CPAP: ICD-10-CM

## 2020-09-30 DIAGNOSIS — Z99.89 OBSTRUCTIVE SLEEP APNEA ON CPAP: ICD-10-CM

## 2020-09-30 DIAGNOSIS — Z79.4 TYPE 2 DIABETES MELLITUS WITHOUT COMPLICATION, WITH LONG-TERM CURRENT USE OF INSULIN (HCC): Primary | Chronic | ICD-10-CM

## 2020-09-30 PROCEDURE — 90682 RIV4 VACC RECOMBINANT DNA IM: CPT

## 2020-09-30 PROCEDURE — 3079F DIAST BP 80-89 MM HG: CPT | Performed by: FAMILY MEDICINE

## 2020-09-30 PROCEDURE — 99214 OFFICE O/P EST MOD 30 MIN: CPT | Performed by: FAMILY MEDICINE

## 2020-09-30 PROCEDURE — 90471 IMMUNIZATION ADMIN: CPT

## 2020-09-30 RX ORDER — PANTOPRAZOLE SODIUM 40 MG/1
40 TABLET, DELAYED RELEASE ORAL
Qty: 90 TABLET | Refills: 3 | Status: SHIPPED | OUTPATIENT
Start: 2020-09-30 | End: 2021-04-19

## 2020-09-30 RX ORDER — ROPINIROLE 2 MG/1
2 TABLET, FILM COATED ORAL 2 TIMES DAILY
Qty: 180 TABLET | Refills: 3 | Status: SHIPPED | OUTPATIENT
Start: 2020-09-30 | End: 2021-06-21

## 2020-09-30 NOTE — PROGRESS NOTES
[facility]        NAME: Liz Davies is a 63 y o  male  : 1965    MRN: [id number]  DATE: 2020  TIME: 4:21 PM    Assessment and Plan   Type 2 diabetes mellitus without complication, with long-term current use of insulin (HCC) [E11 9, Z79 4]  1  Type 2 diabetes mellitus without complication, with long-term current use of insulin ([address] )     2  Restless leg  rOPINIRole (REQUIP) 2 mg tablet   3  Obstructive sleep apnea on CPAP     4  Severe persistent allergic asthma with acute exacerbation  pantoprazole (PROTONIX) 40 mg tablet   5  Familial hypercholesterolemia           Patient Instructions     Patient Instructions   mayur meds          Chief Complaint     Chief Complaint   Patient presents with    Follow-up     6MM         History of Present Illness       F/u assessed med cond--stable      Review of Systems   Review of Systems   Constitutional: Negative for fatigue, fever and unexpected weight change  HENT: Negative for congestion, sinus pain and sore throat  Eyes: Negative for visual disturbance  Respiratory: Negative for shortness of breath and wheezing  Cardiovascular: Negative for chest pain and palpitations  Gastrointestinal: Negative for abdominal pain, nausea and vomiting  Musculoskeletal: Negative  Negative for arthralgias and myalgias  Neurological: Negative for syncope, weakness and numbness  Psychiatric/Behavioral: Negative  Negative for confusion, dysphoric mood and suicidal ideas           Current Medications       Current Outpatient Medications:     albuterol (PROVENTIL HFA,VENTOLIN HFA) 90 mcg/act inhaler, Inhale 2 puffs every 4 (four) hours as needed for wheezing, Disp: 18 g, Rfl: 2    allopurinol (ZYLOPRIM) 100 mg tablet, take 2 tablets by mouth daily, Disp: 60 tablet, Rfl: 5    atorvastatin (LIPITOR) 20 mg tablet, TAKE 1 TABLET BY MOUTH EVERY DAY, Disp: 90 tablet, Rfl: 0    cetirizine (ZyrTEC) 10 mg tablet, Take 10 mg by mouth daily, Disp: , Rfl:     Cyanocobalamin (VITAMIN B 12 PO), Take by mouth, Disp: , Rfl:     gabapentin (NEURONTIN) 100 mg capsule, Take by mouth, Disp: , Rfl:     hylan (SYNVISC) injection, Inject into the joint, Disp: , Rfl:     indomethacin (INDOCIN) 25 mg capsule, Take by mouth every 8 (eight) hours, Disp: , Rfl:     insulin degludec (TRESIBA FLEXTOUCH) 100 units/mL injection pen, Inject 75 Units under the skin daily, Disp: 5 pen, Rfl: 10    ipratropium-albuterol (DUO-NEB) 0 5-2 5 mg/3 mL nebulizer solution, Take 1 vial (3 mL total) by nebulization 4 (four) times a day, Disp: 120 vial, Rfl: 6    JARDIANCE 25 MG TABS, TAKE 1 TABLET BY MOUTH EVERY DAY IN THE MORNING, Disp: 90 tablet, Rfl: 1    [START ON 10/21/2020] lisinopril (ZESTRIL) 40 mg tablet, TAKE 1 TABLET BY MOUTH EVERY DAY, Disp: 90 tablet, Rfl: 0    Magnesium 500 MG TABS, Take by mouth, Disp: , Rfl:     metFORMIN (GLUCOPHAGE) 1000 MG tablet, TAKE 1 TABLET BY MOUTH TWICE A DAY WITH MEALS, Disp: 180 tablet, Rfl: 1    montelukast (SINGULAIR) 10 mg tablet, Take 1 tablet (10 mg total) by mouth daily at bedtime, Disp: 30 tablet, Rfl: 0    Multiple Vitamin (MULTI-VITAMIN DAILY PO), Take 1 tablet by mouth daily, Disp: , Rfl:     oxyCODONE-acetaminophen (PERCOCET) 5-325 mg per tablet, Take 1 tablet by mouth every 4 (four) hours as needed for moderate painMax Daily Amount: 6 tablets, Disp: 30 tablet, Rfl: 0    oxyCODONE-acetaminophen (PERCOCET) 5-325 mg per tablet, Take 1 tablet by mouth every 4 (four) hours as needed for moderate painMax Daily Amount: 6 tablets, Disp: 30 tablet, Rfl: 0    pantoprazole (PROTONIX) 40 mg tablet, Take 1 tablet (40 mg total) by mouth daily before breakfast, Disp: 90 tablet, Rfl: 3    potassium chloride (K-DUR,KLOR-CON) 10 mEq tablet, Take 1 tablet by mouth 2 (two) times a day, Disp: 60 tablet, Rfl: 0    rOPINIRole (REQUIP) 2 mg tablet, Take 1 tablet (2 mg total) by mouth 2 (two) times a day, Disp: 180 tablet, Rfl: 3   SYMBICORT 160-4 5 MCG/ACT inhaler, INHALE 2 PUFFS 2 (TWO) TIMES A DAY RINSE MOUTH AFTER USE , Disp: 30 6 Inhaler, Rfl: 3    Current Allergies     Allergies as of 09/30/2020 - Reviewed 09/30/2020   Allergen Reaction Noted    Other Cough and Nasal Congestion 08/16/2019            The following portions of the patient's history were reviewed and updated as appropriate: allergies, current medications, past family history, past medical history, past social history, past surgical history and problem list      Past Medical History:   Diagnosis Date    Asthma     Chronic pain     Diabetes mellitus ([address] )     Gout     Hyperlipidemia     Hypertension     Impaired fasting glucose     last assessed: 12/16/2015    Knee arthropathy     last assessed: 3/23/2016       Past Surgical History:   Procedure Laterality Date    KNEE ARTHROSCOPY Bilateral     KNEE SURGERY         Family History   Problem Relation Age of Onset    Coronary artery disease Father     Cancer Other         malignant neoplasm    Asthma Brother     No Known Problems Daughter     Asthma Mother     COPD Mother     Asthma Sister     No Known Problems Brother     Asthma Son     No Known Problems Son          Medications have been verified  Objective   /84   Pulse 88   Temp (!) 96 8 °F (36 °C) (Temporal)   Ht 5' 11" (1 803 m)   Wt (!) 153 kg (338 lb)   SpO2 92%   BMI 47 14 kg/m²        Physical Exam     Physical Exam  Constitutional:       Appearance: He is well-developed  HENT:      Right Ear: Ear canal normal  Tympanic membrane is not injected  Left Ear: Ear canal normal  Tympanic membrane is not injected  Nose: Nose normal    Eyes:      General:         Right eye: No discharge  Left eye: No discharge  Conjunctiva/sclera: Conjunctivae normal       Pupils: Pupils are equal, round, and reactive to light  Neck:      Musculoskeletal: Normal range of motion and neck supple  Thyroid: No thyromegaly  Cardiovascular:      Rate and Rhythm: Normal rate and regular rhythm  Heart sounds: Normal heart sounds  No murmur  Pulmonary:      Effort: Pulmonary effort is normal  No respiratory distress  Breath sounds: Normal breath sounds  No wheezing  Abdominal:      General: Bowel sounds are normal  There is no distension  Palpations: Abdomen is soft  Tenderness: There is no abdominal tenderness  Musculoskeletal: Normal range of motion  Feet:      Right foot:      Skin integrity: No ulcer, skin breakdown, erythema, warmth, callus or dry skin  Left foot:      Skin integrity: No ulcer, skin breakdown, erythema, warmth, callus or dry skin  Lymphadenopathy:      Cervical: No cervical adenopathy  Skin:     General: Skin is warm and dry  Neurological:      Mental Status: He is alert and oriented to person, place, and time  He is not disoriented  Sensory: No sensory deficit  Gait: Gait normal       Deep Tendon Reflexes: Reflexes are normal and symmetric  Psychiatric:         Speech: Speech normal          Behavior: Behavior normal          Thought Content:  Thought content normal          Judgment: Judgment normal        Right Foot/Ankle   Right Foot Inspection  Skin Exam: skin normal and skin intact no dry skin, no warmth, no callus, no erythema, no maceration, no abnormal color, no pre-ulcer, no ulcer and no callus                                  Left Foot/Ankle  Left Foot Inspection  Skin Exam: skin normal and skin intactno dry skin, no warmth, no erythema, no maceration, normal color, no pre-ulcer, no ulcer and no callus                                             Assign Risk Category:  ; ;        Risk: 0

## 2020-10-21 PROCEDURE — 4010F ACE/ARB THERAPY RXD/TAKEN: CPT | Performed by: FAMILY MEDICINE

## 2020-11-09 DIAGNOSIS — E03.9 ACQUIRED HYPOTHYROIDISM: ICD-10-CM

## 2020-11-09 RX ORDER — LISINOPRIL 40 MG/1
TABLET ORAL
Qty: 90 TABLET | Refills: 0 | Status: SHIPPED | OUTPATIENT
Start: 2020-11-09 | End: 2021-03-28

## 2020-12-18 DIAGNOSIS — E11.9 TYPE 2 DIABETES MELLITUS WITHOUT COMPLICATION, WITHOUT LONG-TERM CURRENT USE OF INSULIN (HCC): ICD-10-CM

## 2020-12-18 RX ORDER — EMPAGLIFLOZIN 25 MG/1
TABLET, FILM COATED ORAL
Qty: 90 TABLET | Refills: 1 | Status: SHIPPED | OUTPATIENT
Start: 2020-12-18 | End: 2021-03-03

## 2021-03-03 ENCOUNTER — OFFICE VISIT (OUTPATIENT)
Dept: FAMILY MEDICINE CLINIC | Facility: CLINIC | Age: 56
End: 2021-03-03
Payer: COMMERCIAL

## 2021-03-03 VITALS
SYSTOLIC BLOOD PRESSURE: 136 MMHG | HEIGHT: 71 IN | BODY MASS INDEX: 44.1 KG/M2 | OXYGEN SATURATION: 95 % | TEMPERATURE: 98.7 F | WEIGHT: 315 LBS | HEART RATE: 98 BPM | DIASTOLIC BLOOD PRESSURE: 82 MMHG

## 2021-03-03 DIAGNOSIS — E11.9 TYPE 2 DIABETES MELLITUS WITHOUT COMPLICATION, WITH LONG-TERM CURRENT USE OF INSULIN (HCC): Primary | Chronic | ICD-10-CM

## 2021-03-03 DIAGNOSIS — M10.9 GOUT, UNSPECIFIED CAUSE, UNSPECIFIED CHRONICITY, UNSPECIFIED SITE: ICD-10-CM

## 2021-03-03 DIAGNOSIS — I10 ESSENTIAL HYPERTENSION: Chronic | ICD-10-CM

## 2021-03-03 DIAGNOSIS — Z79.4 TYPE 2 DIABETES MELLITUS WITHOUT COMPLICATION, WITH LONG-TERM CURRENT USE OF INSULIN (HCC): Primary | Chronic | ICD-10-CM

## 2021-03-03 DIAGNOSIS — G47.33 OBSTRUCTIVE SLEEP APNEA ON CPAP: ICD-10-CM

## 2021-03-03 DIAGNOSIS — E11.9 TYPE 2 DIABETES MELLITUS WITHOUT COMPLICATION, WITH LONG-TERM CURRENT USE OF INSULIN (HCC): Chronic | ICD-10-CM

## 2021-03-03 DIAGNOSIS — Z79.4 TYPE 2 DIABETES MELLITUS WITHOUT COMPLICATION, WITH LONG-TERM CURRENT USE OF INSULIN (HCC): Chronic | ICD-10-CM

## 2021-03-03 DIAGNOSIS — Z00.00 WELLNESS EXAMINATION: ICD-10-CM

## 2021-03-03 DIAGNOSIS — Z99.89 OBSTRUCTIVE SLEEP APNEA ON CPAP: ICD-10-CM

## 2021-03-03 LAB — SL AMB POCT HEMOGLOBIN AIC: 11.2 (ref ?–6.5)

## 2021-03-03 PROCEDURE — 83036 HEMOGLOBIN GLYCOSYLATED A1C: CPT | Performed by: FAMILY MEDICINE

## 2021-03-03 PROCEDURE — 3046F HEMOGLOBIN A1C LEVEL >9.0%: CPT | Performed by: FAMILY MEDICINE

## 2021-03-03 PROCEDURE — 99214 OFFICE O/P EST MOD 30 MIN: CPT | Performed by: FAMILY MEDICINE

## 2021-03-03 PROCEDURE — 3008F BODY MASS INDEX DOCD: CPT | Performed by: FAMILY MEDICINE

## 2021-03-03 PROCEDURE — 3725F SCREEN DEPRESSION PERFORMED: CPT | Performed by: FAMILY MEDICINE

## 2021-03-03 RX ORDER — HYDROCODONE BITARTRATE AND ACETAMINOPHEN 10; 325 MG/1; MG/1
1 TABLET ORAL EVERY 4 HOURS PRN
COMMUNITY
Start: 2021-03-01

## 2021-03-03 RX ORDER — GLIPIZIDE 5 MG/1
5 TABLET ORAL
Qty: 90 TABLET | Refills: 3 | Status: SHIPPED | OUTPATIENT
Start: 2021-03-03 | End: 2022-01-24

## 2021-03-03 NOTE — PROGRESS NOTES
HPI:  Rachel Bobby is a 61 y o  male here for his yearly health maintenance exam    Patient Active Problem List   Diagnosis    Hyponatremia    Diabetes mellitus, [facility] 2 [facility]    Hyperglycemia due to type 2 diabetes mellitus ([facility] )    Weakness generalized    Hypokalemia    Hypertension    Hyperlipidemia    Chronic pain syndrome    Severe persistent allergic asthma with acute exacerbation    Obstructive sleep apnea on CPAP    Severe persistent asthma without complication    Vasomotor rhinitis    Gout     Past Medical History:   Diagnosis Date    Asthma     Chronic pain     Diabetes mellitus ([facility] )     Gout     Hyperlipidemia     Hypertension     Impaired fasting glucose     last assessed: 12/16/2015    Knee arthropathy     last assessed: 3/23/2016       1  Advanced Directive: n     2  Durable Power of  for Healthcare: n     3  Social History:           Drug and alcohol History: n                  4  Immunizations up to date: n                 Lifestyle:                           Healthy Diet:y                          Alcohol Use:y                          Tobacco Use:n                          Regular exercise:y                          Weight concerns:y                               5   Over the past 2 weeks, how often have you been bothered by the following:              Little interest or pleasure in doing things:n              Felling down, depressed or hopeless:n       Current Outpatient Medications   Medication Sig Dispense Refill    albuterol (PROVENTIL HFA,VENTOLIN HFA) 90 mcg/act inhaler Inhale 2 puffs every 4 (four) hours as needed for wheezing 18 g 2    allopurinol (ZYLOPRIM) 100 mg tablet take 2 tablets by mouth daily 60 tablet 5    atorvastatin (LIPITOR) 20 mg tablet TAKE 1 TABLET BY MOUTH EVERY DAY 90 tablet 0    cetirizine (ZyrTEC) 10 mg tablet Take 10 mg by mouth daily      Cyanocobalamin (VITAMIN B 12 PO) Take by mouth      Dulaglutide 0 75 MG/0 5ML SOPN Inject 0 5 mL (0 75 mg total) under the skin once a week 4 pen 11    gabapentin (NEURONTIN) 100 mg capsule Take by mouth      glipiZIDE (GLUCOTROL) 5 mg tablet Take 1 tablet (5 mg total) by mouth daily with breakfast 90 tablet 3    HYDROcodone-acetaminophen (NORCO)  mg per tablet Take 1 tablet by mouth every 4 (four) hours as needed      hylan (SYNVISC) injection Inject into the joint      indomethacin (INDOCIN) 25 mg capsule Take by mouth every 8 (eight) hours      insulin degludec (TRESIBA FLEXTOUCH) 100 units/mL injection pen Inject 75 Units under the skin daily 5 pen 10    ipratropium-albuterol (DUO-NEB) 0 5-2 5 mg/3 mL nebulizer solution Take 1 vial (3 mL total) by nebulization 4 (four) times a day 120 vial 6    lisinopril (ZESTRIL) 40 mg tablet TAKE 1 TABLET BY MOUTH EVERY DAY 90 tablet 0    Magnesium 500 MG TABS Take by mouth      montelukast (SINGULAIR) 10 mg tablet Take 1 tablet (10 mg total) by mouth daily at bedtime 30 tablet 0    Multiple Vitamin (MULTI-VITAMIN DAILY PO) Take 1 tablet by mouth daily      pantoprazole (PROTONIX) 40 mg tablet Take 1 tablet (40 mg total) by mouth daily before breakfast 90 tablet 3    potassium chloride (K-DUR,KLOR-CON) 10 mEq tablet Take 1 tablet by mouth 2 (two) times a day 60 tablet 0    rOPINIRole (REQUIP) 2 mg tablet Take 1 tablet (2 mg total) by mouth 2 (two) times a day 180 tablet 3    SYMBICORT 160-4 5 MCG/ACT inhaler INHALE 2 PUFFS 2 (TWO) TIMES A DAY RINSE MOUTH AFTER USE  30 6 Inhaler 3     No current facility-administered medications for this visit        Allergies   Allergen Reactions    Other Cough and Nasal Congestion     Seasonal allergies      Immunization History   Administered Date(s) Administered    INFLUENZA 09/03/2014, 12/16/2015, 09/02/2016    Influenza, recombinant, quadrivalent,injectable, preservative free 10/30/2019, 09/30/2020    Influenza, seasonal, injectable 09/03/2014, 09/02/2016, 08/30/2017    Pneumococcal Conjugate 13-Valent 08/30/2017    Pneumococcal Polysaccharide PPV23 08/12/2013    Tdap 07/28/2018       Patient Care Team:  Waleska Fernandes MD as PCP - MD Dorene Betancourt MD    Review of Systems   Constitutional: Negative for fatigue, fever and unexpected weight change  HENT: Negative for congestion, sinus pain and sore throat  Eyes: Negative for visual disturbance  Respiratory: Negative for shortness of breath and wheezing  Cardiovascular: Negative for chest pain and palpitations  Gastrointestinal: Negative for abdominal pain, nausea and vomiting  Musculoskeletal: Negative  Negative for arthralgias and myalgias  Neurological: Negative for syncope, weakness and numbness  Psychiatric/Behavioral: Negative  Negative for confusion, dysphoric mood and suicidal ideas  Physical Exam :  Physical Exam  Constitutional:       Appearance: He is well-developed  HENT:      Right Ear: Ear canal normal  Tympanic membrane is not injected  Left Ear: Ear canal normal  Tympanic membrane is not injected  Nose: Nose normal    Eyes:      General:         Right eye: No discharge  Left eye: No discharge  Conjunctiva/sclera: Conjunctivae normal       Pupils: Pupils are equal, round, and reactive to light  Neck:      Musculoskeletal: Normal range of motion and neck supple  Thyroid: No thyromegaly  Cardiovascular:      Rate and Rhythm: Normal rate and regular rhythm  Heart sounds: Normal heart sounds  No murmur  Pulmonary:      Effort: Pulmonary effort is normal  No respiratory distress  Breath sounds: Normal breath sounds  No wheezing  Abdominal:      General: Bowel sounds are normal  There is no distension  Palpations: Abdomen is soft  Tenderness: There is no abdominal tenderness  Musculoskeletal: Normal range of motion  Lymphadenopathy:      Cervical: No cervical adenopathy  Skin:     General: Skin is warm and dry     Neurological:      Mental Status: He is alert and oriented to person, place, and time  He is not disoriented  Sensory: No sensory deficit  Gait: Gait normal       Deep Tendon Reflexes: Reflexes are normal and symmetric  Psychiatric:         Speech: Speech normal          Behavior: Behavior normal          Thought Content: Thought content normal          Judgment: Judgment normal            Assessment and Plan:  1  Type 2 diabetes mellitus without complication, with long-term current use of insulin (HCC)  Dulaglutide 0 75 MG/0 5ML SOPN    glipiZIDE (GLUCOTROL) 5 mg tablet   2  Obstructive sleep apnea on CPAP     3  Essential hypertension     4  Gout, unspecified cause, unspecified chronicity, unspecified site     5   Wellness examination         Health Maintenance Due   Topic Date Due    Hepatitis C Screening  1965    HIV Screening  01/06/1979    Depression Screening PHQ  11/25/2020    Annual Physical  02/21/2021    BMI: Followup Plan  02/22/2021    HEMOGLOBIN A1C  03/19/2021

## 2021-03-10 RX ORDER — DULAGLUTIDE 0.75 MG/.5ML
INJECTION, SOLUTION SUBCUTANEOUS
Qty: 4 PEN | Refills: 5 | Status: SHIPPED | OUTPATIENT
Start: 2021-03-10 | End: 2022-01-18

## 2021-03-10 NOTE — TELEPHONE ENCOUNTER
Approve  Already send on 03/03/2021  insurance approve medication    Please denied RX already send 03/03/2021

## 2021-03-27 DIAGNOSIS — E03.9 ACQUIRED HYPOTHYROIDISM: ICD-10-CM

## 2021-03-28 PROCEDURE — 4010F ACE/ARB THERAPY RXD/TAKEN: CPT | Performed by: FAMILY MEDICINE

## 2021-03-28 RX ORDER — LISINOPRIL 40 MG/1
TABLET ORAL
Qty: 90 TABLET | Refills: 0 | Status: SHIPPED | OUTPATIENT
Start: 2021-03-28 | End: 2021-05-18

## 2021-04-09 DIAGNOSIS — E78.2 MIXED HYPERLIPIDEMIA: ICD-10-CM

## 2021-04-11 RX ORDER — ATORVASTATIN CALCIUM 20 MG/1
TABLET, FILM COATED ORAL
Qty: 90 TABLET | Refills: 0 | Status: SHIPPED | OUTPATIENT
Start: 2021-04-11 | End: 2021-05-18

## 2021-04-19 ENCOUNTER — OFFICE VISIT (OUTPATIENT)
Dept: FAMILY MEDICINE CLINIC | Facility: CLINIC | Age: 56
End: 2021-04-19
Payer: COMMERCIAL

## 2021-04-19 VITALS
RESPIRATION RATE: 16 BRPM | OXYGEN SATURATION: 94 % | BODY MASS INDEX: 44.1 KG/M2 | DIASTOLIC BLOOD PRESSURE: 82 MMHG | HEIGHT: 71 IN | WEIGHT: 315 LBS | SYSTOLIC BLOOD PRESSURE: 140 MMHG | HEART RATE: 106 BPM

## 2021-04-19 DIAGNOSIS — G47.33 OBSTRUCTIVE SLEEP APNEA ON CPAP: ICD-10-CM

## 2021-04-19 DIAGNOSIS — Z00.00 WELLNESS EXAMINATION: ICD-10-CM

## 2021-04-19 DIAGNOSIS — Z99.89 OBSTRUCTIVE SLEEP APNEA ON CPAP: ICD-10-CM

## 2021-04-19 DIAGNOSIS — E11.65 TYPE 2 DIABETES MELLITUS WITH HYPERGLYCEMIA, WITH LONG-TERM CURRENT USE OF INSULIN (HCC): Primary | ICD-10-CM

## 2021-04-19 DIAGNOSIS — E11.9 TYPE 2 DIABETES MELLITUS WITHOUT COMPLICATION, WITH LONG-TERM CURRENT USE OF INSULIN (HCC): Chronic | ICD-10-CM

## 2021-04-19 DIAGNOSIS — Z79.4 TYPE 2 DIABETES MELLITUS WITHOUT COMPLICATION, WITH LONG-TERM CURRENT USE OF INSULIN (HCC): Chronic | ICD-10-CM

## 2021-04-19 DIAGNOSIS — Z79.4 TYPE 2 DIABETES MELLITUS WITH HYPERGLYCEMIA, WITH LONG-TERM CURRENT USE OF INSULIN (HCC): Primary | ICD-10-CM

## 2021-04-19 PROCEDURE — 1036F TOBACCO NON-USER: CPT | Performed by: FAMILY MEDICINE

## 2021-04-19 PROCEDURE — 99214 OFFICE O/P EST MOD 30 MIN: CPT | Performed by: FAMILY MEDICINE

## 2021-04-19 PROCEDURE — 3008F BODY MASS INDEX DOCD: CPT | Performed by: FAMILY MEDICINE

## 2021-04-19 PROCEDURE — 99396 PREV VISIT EST AGE 40-64: CPT | Performed by: FAMILY MEDICINE

## 2021-04-19 NOTE — PROGRESS NOTES
[facility]        NAME: Moy Israel is a 61 y o  male  : 1965    MRN: [id number]  DATE: 2021  TIME: 8:03 AM    Assessment and Plan   Type 2 diabetes mellitus with hyperglycemia, with long-term current use of insulin ([facility]) [E11 65, Z79 4]  1  Type 2 diabetes mellitus with hyperglycemia, with long-term current use of insulin ([facility])  Comprehensive metabolic panel    Hemoglobin A1c (w/out EAG)   2  Type 2 diabetes mellitus without complication, with long-term current use of insulin ([address] )     3  Obstructive sleep apnea on CPAP     4  Wellness examination           Patient Instructions     There are no Patient Instructions on file for this visit  Chief Complaint     Chief Complaint   Patient presents with    Diabetes     follow up meds/sugars         History of Present Illness       F/u DM--fasting gluc 150s    Diabetes  Pertinent negatives for hypoglycemia include no confusion  Pertinent negatives for diabetes include no chest pain, no fatigue and no weakness  Review of Systems   Review of Systems   Constitutional: Negative for fatigue, fever and unexpected weight change  HENT: Negative for congestion, sinus pain and sore throat  Eyes: Negative for visual disturbance  Respiratory: Negative for shortness of breath and wheezing  Cardiovascular: Negative for chest pain and palpitations  Gastrointestinal: Negative for abdominal pain, nausea and vomiting  Musculoskeletal: Negative  Negative for arthralgias and myalgias  Neurological: Negative for syncope, weakness and numbness  Psychiatric/Behavioral: Negative  Negative for confusion, dysphoric mood and suicidal ideas           Current Medications       Current Outpatient Medications:     albuterol (PROVENTIL HFA,VENTOLIN HFA) 90 mcg/act inhaler, Inhale 2 puffs every 4 (four) hours as needed for wheezing, Disp: 18 g, Rfl: 2    allopurinol (ZYLOPRIM) 100 mg tablet, take 2 tablets by mouth daily, Disp: 60 tablet, Rfl: 5    atorvastatin (LIPITOR) 20 mg tablet, TAKE 1 TABLET BY MOUTH EVERY DAY, Disp: 90 tablet, Rfl: 0    cetirizine (ZyrTEC) 10 mg tablet, Take 10 mg by mouth daily, Disp: , Rfl:     Cyanocobalamin (VITAMIN B 12 PO), Take by mouth, Disp: , Rfl:     glipiZIDE (GLUCOTROL) 5 mg tablet, Take 1 tablet (5 mg total) by mouth daily with breakfast, Disp: 90 tablet, Rfl: 3    HYDROcodone-acetaminophen (NORCO)  mg per tablet, Take 1 tablet by mouth every 4 (four) hours as needed, Disp: , Rfl:     indomethacin (INDOCIN) 25 mg capsule, Take by mouth every 8 (eight) hours, Disp: , Rfl:     ipratropium-albuterol (DUO-NEB) 0 5-2 5 mg/3 mL nebulizer solution, Take 1 vial (3 mL total) by nebulization 4 (four) times a day, Disp: 120 vial, Rfl: 6    lisinopril (ZESTRIL) 40 mg tablet, TAKE 1 TABLET BY MOUTH EVERY DAY, Disp: 90 tablet, Rfl: 0    Magnesium 500 MG TABS, Take by mouth, Disp: , Rfl:     montelukast (SINGULAIR) 10 mg tablet, Take 1 tablet (10 mg total) by mouth daily at bedtime, Disp: 30 tablet, Rfl: 0    Multiple Vitamin (MULTI-VITAMIN DAILY PO), Take 1 tablet by mouth daily, Disp: , Rfl:     potassium chloride (K-DUR,KLOR-CON) 10 mEq tablet, Take 1 tablet by mouth 2 (two) times a day, Disp: 60 tablet, Rfl: 0    rOPINIRole (REQUIP) 2 mg tablet, Take 1 tablet (2 mg total) by mouth 2 (two) times a day, Disp: 180 tablet, Rfl: 3    SYMBICORT 160-4 5 MCG/ACT inhaler, INHALE 2 PUFFS 2 (TWO) TIMES A DAY RINSE MOUTH AFTER USE , Disp: 30 6 Inhaler, Rfl: 3    Trulicity 9 86 VL/0 8FL SOPN, INJECT 0 5 ML (0 75 MG TOTAL) UNDER THE SKIN ONCE A WEEK, Disp: 4 pen, Rfl: 5    gabapentin (NEURONTIN) 100 mg capsule, Take by mouth, Disp: , Rfl:     hylan (SYNVISC) injection, Inject into the joint, Disp: , Rfl:     Current Allergies     Allergies as of 04/19/2021 - Reviewed 04/19/2021   Allergen Reaction Noted    Other Cough and Nasal Congestion 08/16/2019            The following portions of the patient's history were reviewed and updated as appropriate: allergies, current medications, past family history, past medical history, past social history, past surgical history and problem list      Past Medical History:   Diagnosis Date    Asthma     Chronic pain     Diabetes mellitus ([address] )     Gout     Hyperlipidemia     Hypertension     Impaired fasting glucose     last assessed: 12/16/2015    Knee arthropathy     last assessed: 3/23/2016       Past Surgical History:   Procedure Laterality Date    KNEE ARTHROSCOPY Bilateral     KNEE SURGERY         Family History   Problem Relation Age of Onset    Coronary artery disease Father     Cancer Other         malignant neoplasm    Asthma Brother     No Known Problems Daughter     Asthma Mother     COPD Mother     Asthma Sister     No Known Problems Brother     Asthma Son     No Known Problems Son          Medications have been verified  Objective   /82   Pulse (!) 106   Resp 16   Ht 5' 11" (1 803 m)   Wt (!) 147 kg (323 lb)   SpO2 94%   BMI 45 05 kg/m²        Physical Exam     Physical Exam  Constitutional:       Appearance: He is well-developed  HENT:      Right Ear: Ear canal normal  Tympanic membrane is not injected  Left Ear: Ear canal normal  Tympanic membrane is not injected  Nose: Nose normal    Eyes:      General:         Right eye: No discharge  Left eye: No discharge  Conjunctiva/sclera: Conjunctivae normal       Pupils: Pupils are equal, round, and reactive to light  Neck:      Musculoskeletal: Normal range of motion and neck supple  Thyroid: No thyromegaly  Cardiovascular:      Rate and Rhythm: Normal rate and regular rhythm  Heart sounds: Normal heart sounds  No murmur  Pulmonary:      Effort: Pulmonary effort is normal  No respiratory distress  Breath sounds: Normal breath sounds  No wheezing  Abdominal:      General: Bowel sounds are normal  There is no distension  Palpations: Abdomen is soft  Tenderness: There is no abdominal tenderness  Musculoskeletal: Normal range of motion  Lymphadenopathy:      Cervical: No cervical adenopathy  Skin:     General: Skin is warm and dry  Neurological:      Mental Status: He is alert and oriented to person, place, and time  He is not disoriented  Sensory: No sensory deficit  Gait: Gait normal       Deep Tendon Reflexes: Reflexes are normal and symmetric  Psychiatric:         Speech: Speech normal          Behavior: Behavior normal          Thought Content:  Thought content normal          Judgment: Judgment normal

## 2021-04-19 NOTE — PROGRESS NOTES
HPI:  Kell Robbins is a 61 y o  male here for his yearly health maintenance exam    Patient Active Problem List   Diagnosis    Hyponatremia    Diabetes mellitus, type 2 ([facility] [address] )    Hyperglycemia due to type 2 diabetes mellitus ([facility] )    Weakness generalized    Hypokalemia    Hypertension    Hyperlipidemia    Chronic pain syndrome    Severe persistent allergic asthma with acute exacerbation    Obstructive sleep apnea on CPAP    Severe persistent asthma without complication    Vasomotor rhinitis    Gout     Past Medical History:   Diagnosis Date    Asthma     Chronic pain     Diabetes mellitus ([facility] )     Gout     Hyperlipidemia     Hypertension     Impaired fasting glucose     last assessed: 12/16/2015    Knee arthropathy     last assessed: 3/23/2016       1  Advanced Directive: n     2  Durable Power of  for Healthcare: n     3  Social History:           Drug and alcohol History: n                  4  Immunizations up to date: y                 Lifestyle:                           Healthy Diet:y                          Alcohol Use:n                          Tobacco Use:n                          Regular exercise:y                          Weight concerns:y                               5   Over the past 2 weeks, how often have you been bothered by the following:              Little interest or pleasure in doing things:n              Felling down, depressed or hopeless:n       Current Outpatient Medications   Medication Sig Dispense Refill    albuterol (PROVENTIL HFA,VENTOLIN HFA) 90 mcg/act inhaler Inhale 2 puffs every 4 (four) hours as needed for wheezing 18 g 2    allopurinol (ZYLOPRIM) 100 mg tablet take 2 tablets by mouth daily 60 tablet 5    atorvastatin (LIPITOR) 20 mg tablet TAKE 1 TABLET BY MOUTH EVERY DAY 90 tablet 0    cetirizine (ZyrTEC) 10 mg tablet Take 10 mg by mouth daily      Cyanocobalamin (VITAMIN B 12 PO) Take by mouth      glipiZIDE (GLUCOTROL) 5 mg tablet Take 1 tablet (5 mg total) by mouth daily with breakfast 90 tablet 3    HYDROcodone-acetaminophen (NORCO)  mg per tablet Take 1 tablet by mouth every 4 (four) hours as needed      indomethacin (INDOCIN) 25 mg capsule Take by mouth every 8 (eight) hours      ipratropium-albuterol (DUO-NEB) 0 5-2 5 mg/3 mL nebulizer solution Take 1 vial (3 mL total) by nebulization 4 (four) times a day 120 vial 6    lisinopril (ZESTRIL) 40 mg tablet TAKE 1 TABLET BY MOUTH EVERY DAY 90 tablet 0    Magnesium 500 MG TABS Take by mouth      montelukast (SINGULAIR) 10 mg tablet Take 1 tablet (10 mg total) by mouth daily at bedtime 30 tablet 0    Multiple Vitamin (MULTI-VITAMIN DAILY PO) Take 1 tablet by mouth daily      potassium chloride (K-DUR,KLOR-CON) 10 mEq tablet Take 1 tablet by mouth 2 (two) times a day 60 tablet 0    rOPINIRole (REQUIP) 2 mg tablet Take 1 tablet (2 mg total) by mouth 2 (two) times a day 180 tablet 3    SYMBICORT 160-4 5 MCG/ACT inhaler INHALE 2 PUFFS 2 (TWO) TIMES A DAY RINSE MOUTH AFTER USE  30 6 Inhaler 3    Trulicity 3 73 TK/1 7FI SOPN INJECT 0 5 ML (0 75 MG TOTAL) UNDER THE SKIN ONCE A WEEK 4 pen 5    gabapentin (NEURONTIN) 100 mg capsule Take by mouth      hylan (SYNVISC) injection Inject into the joint       No current facility-administered medications for this visit        Allergies   Allergen Reactions    Other Cough and Nasal Congestion     Seasonal allergies      Immunization History   Administered Date(s) Administered    INFLUENZA 09/03/2014, 12/16/2015, 09/02/2016    Influenza, recombinant, quadrivalent,injectable, preservative free 10/30/2019, 09/30/2020    Influenza, seasonal, injectable 09/03/2014, 09/02/2016, 08/30/2017    Pneumococcal Conjugate 13-Valent 08/30/2017    Pneumococcal Polysaccharide PPV23 08/12/2013    Tdap 07/28/2018       Patient Care Team:  Elly Barnes MD as PCP - MD Chuck Naranjo MD    Review of Systems   Constitutional: Negative for fatigue, fever and unexpected weight change  HENT: Negative for congestion, sinus pressure and sore throat  Eyes: Negative for visual disturbance  Respiratory: Negative for shortness of breath and wheezing  Cardiovascular: Negative for chest pain and palpitations  Gastrointestinal: Negative for abdominal pain, diarrhea, nausea and vomiting  Physical Exam :  Physical Exam  Constitutional:       General: He is not in acute distress  Appearance: He is well-developed  He is not diaphoretic  HENT:      Right Ear: Tympanic membrane, ear canal and external ear normal  Tympanic membrane is not injected  Left Ear: Tympanic membrane, ear canal and external ear normal  Tympanic membrane is not injected  Nose: Nose normal       Mouth/Throat:      Pharynx: Uvula midline  Eyes:      Conjunctiva/sclera: Conjunctivae normal       Pupils: Pupils are equal, round, and reactive to light  Neck:      Musculoskeletal: Normal range of motion and neck supple  Thyroid: No thyromegaly  Cardiovascular:      Rate and Rhythm: Normal rate and regular rhythm  Heart sounds: Normal heart sounds  No murmur  Pulmonary:      Effort: Pulmonary effort is normal  No respiratory distress  Breath sounds: Normal breath sounds  No wheezing  Lymphadenopathy:      Cervical: No cervical adenopathy  Assessment and Plan:  1  Type 2 diabetes mellitus with hyperglycemia, with long-term current use of insulin ([facility])  Comprehensive metabolic panel    Hemoglobin A1c (w/out EAG)   2  Type 2 diabetes mellitus without complication, with long-term current use of insulin ([address] )     3  Obstructive sleep apnea on CPAP     4   Wellness examination         Health Maintenance Due   Topic Date Due    Hepatitis C Screening  Never done    COVID-19 Vaccine (1) Never done    Annual Physical  02/21/2021    BMI: Followup Plan  02/22/2021

## 2021-05-12 DIAGNOSIS — J40 BRONCHITIS: ICD-10-CM

## 2021-05-12 NOTE — TELEPHONE ENCOUNTER
Patient is requesting his SYMBICORT 160-4 5 MCG/ACT Inhaler     Gets refilled & sent to [facility] in [facility]

## 2021-05-13 RX ORDER — BUDESONIDE AND FORMOTEROL FUMARATE DIHYDRATE 160; 4.5 UG/1; UG/1
2 AEROSOL RESPIRATORY (INHALATION) 2 TIMES DAILY
Qty: 30.6 G | Refills: 5 | Status: SHIPPED | OUTPATIENT
Start: 2021-05-13 | End: 2021-05-17

## 2021-05-17 ENCOUNTER — TELEPHONE (OUTPATIENT)
Dept: FAMILY MEDICINE CLINIC | Facility: CLINIC | Age: 56
End: 2021-05-17

## 2021-05-17 DIAGNOSIS — E03.9 ACQUIRED HYPOTHYROIDISM: ICD-10-CM

## 2021-05-17 DIAGNOSIS — J45.41 MODERATE PERSISTENT ASTHMA WITH ACUTE EXACERBATION: Primary | ICD-10-CM

## 2021-05-17 DIAGNOSIS — E78.2 MIXED HYPERLIPIDEMIA: ICD-10-CM

## 2021-05-17 NOTE — TELEPHONE ENCOUNTER
Pt called stating medication symbicort 160-4 5 mcg will not be covered by his insurance if you can send alternative medication for him? [facility] pharmacy

## 2021-05-18 PROCEDURE — 4010F ACE/ARB THERAPY RXD/TAKEN: CPT | Performed by: FAMILY MEDICINE

## 2021-05-18 RX ORDER — ATORVASTATIN CALCIUM 20 MG/1
TABLET, FILM COATED ORAL
Qty: 90 TABLET | Refills: 0 | Status: SHIPPED | OUTPATIENT
Start: 2021-05-18 | End: 2021-06-21

## 2021-05-18 RX ORDER — LISINOPRIL 40 MG/1
TABLET ORAL
Qty: 90 TABLET | Refills: 0 | Status: SHIPPED | OUTPATIENT
Start: 2021-05-18 | End: 2021-09-09

## 2021-06-03 ENCOUNTER — TELEPHONE (OUTPATIENT)
Dept: FAMILY MEDICINE CLINIC | Facility: CLINIC | Age: 56
End: 2021-06-03

## 2021-06-03 DIAGNOSIS — R52 PAIN: Primary | ICD-10-CM

## 2021-06-03 RX ORDER — PREDNISONE 20 MG/1
TABLET ORAL
Qty: 15 TABLET | Refills: 1 | Status: SHIPPED | OUTPATIENT
Start: 2021-06-03 | End: 2022-01-25 | Stop reason: SDUPTHER

## 2021-06-03 NOTE — TELEPHONE ENCOUNTER
Pt has asthma and feels chest tightness      Pt would like to get prednisone -7 day treatment -according to pt his been prescribed this before       Send to   [facility] [phone]      Z#[id number]

## 2021-06-20 DIAGNOSIS — J40 BRONCHITIS: ICD-10-CM

## 2021-06-20 DIAGNOSIS — G25.81 RESTLESS LEG: ICD-10-CM

## 2021-06-20 DIAGNOSIS — E78.2 MIXED HYPERLIPIDEMIA: ICD-10-CM

## 2021-06-21 RX ORDER — BUDESONIDE AND FORMOTEROL FUMARATE DIHYDRATE 160; 4.5 UG/1; UG/1
AEROSOL RESPIRATORY (INHALATION)
Qty: 10.2 G | Refills: 3 | Status: SHIPPED | OUTPATIENT
Start: 2021-06-21 | End: 2021-11-09

## 2021-06-21 RX ORDER — ROPINIROLE 2 MG/1
TABLET, FILM COATED ORAL
Qty: 180 TABLET | Refills: 3 | Status: SHIPPED | OUTPATIENT
Start: 2021-06-21 | End: 2022-07-09

## 2021-06-21 RX ORDER — ATORVASTATIN CALCIUM 20 MG/1
TABLET, FILM COATED ORAL
Qty: 90 TABLET | Refills: 0 | Status: SHIPPED | OUTPATIENT
Start: 2021-06-21 | End: 2022-01-04

## 2021-09-09 DIAGNOSIS — M10.9 GOUT, UNSPECIFIED CAUSE, UNSPECIFIED CHRONICITY, UNSPECIFIED SITE: ICD-10-CM

## 2021-09-09 DIAGNOSIS — E03.9 ACQUIRED HYPOTHYROIDISM: ICD-10-CM

## 2021-09-09 RX ORDER — ALLOPURINOL 100 MG/1
TABLET ORAL
Qty: 180 TABLET | Refills: 0 | Status: SHIPPED | OUTPATIENT
Start: 2021-09-09

## 2021-09-09 RX ORDER — LISINOPRIL 40 MG/1
TABLET ORAL
Qty: 90 TABLET | Refills: 0 | Status: SHIPPED | OUTPATIENT
Start: 2021-09-09 | End: 2022-01-25 | Stop reason: SDUPTHER

## 2021-11-09 DIAGNOSIS — J40 BRONCHITIS: ICD-10-CM

## 2021-11-09 RX ORDER — BUDESONIDE AND FORMOTEROL FUMARATE DIHYDRATE 160; 4.5 UG/1; UG/1
AEROSOL RESPIRATORY (INHALATION)
Qty: 10.2 G | Refills: 3 | Status: SHIPPED | OUTPATIENT
Start: 2021-11-09 | End: 2022-01-31 | Stop reason: SDUPTHER

## 2022-01-11 NOTE — PROGRESS NOTES
[facility]        NAME: Angelica Mabry is a 61 y o  male  : 1965    MRN: [id number]  DATE: March 3, 2021  TIME: 4:48 PM    Assessment and Plan   Type 2 diabetes mellitus without complication, with long-term current use of insulin ([facility]) [E11 9, Z79 4]  1  Type 2 diabetes mellitus without complication, with long-term current use of insulin ([address] )     2  Obstructive sleep apnea on CPAP     3  Essential hypertension     4  Gout, unspecified cause, unspecified chronicity, unspecified site     5  Wellness examination           Patient Instructions     Patient Instructions   Intol metformin/jardiance---trial GLP-1          Chief Complaint     Chief Complaint   Patient presents with    Follow-up     6MM/labs    Annual Exam     HM         History of Present Illness       M4o==78 2--stopped jardiance      Review of Systems   Review of Systems   Constitutional: Negative for fatigue, fever and unexpected weight change  HENT: Negative for congestion, sinus pain and sore throat  Eyes: Negative for visual disturbance  Respiratory: Negative for shortness of breath and wheezing  Cardiovascular: Negative for chest pain and palpitations  Gastrointestinal: Negative for abdominal pain, nausea and vomiting  Musculoskeletal: Negative  Negative for arthralgias and myalgias  Neurological: Negative for syncope, weakness and numbness  Psychiatric/Behavioral: Negative  Negative for confusion, dysphoric mood and suicidal ideas           Current Medications       Current Outpatient Medications:     albuterol (PROVENTIL HFA,VENTOLIN HFA) 90 mcg/act inhaler, Inhale 2 puffs every 4 (four) hours as needed for wheezing, Disp: 18 g, Rfl: 2    allopurinol (ZYLOPRIM) 100 mg tablet, take 2 tablets by mouth daily, Disp: 60 tablet, Rfl: 5    atorvastatin (LIPITOR) 20 mg tablet, TAKE 1 TABLET BY MOUTH EVERY DAY, Disp: 90 tablet, Rfl: 0    cetirizine (ZyrTEC) 10 mg tablet, Take 10 mg by mouth daily, Disp: , Rfl:     Cyanocobalamin (VITAMIN B 12 PO), Take by mouth, Disp: , Rfl:     gabapentin (NEURONTIN) 100 mg capsule, Take by mouth, Disp: , Rfl:     HYDROcodone-acetaminophen (NORCO)  mg per tablet, Take 1 tablet by mouth every 4 (four) hours as needed, Disp: , Rfl:     hylan (SYNVISC) injection, Inject into the joint, Disp: , Rfl:     indomethacin (INDOCIN) 25 mg capsule, Take by mouth every 8 (eight) hours, Disp: , Rfl:     insulin degludec (TRESIBA FLEXTOUCH) 100 units/mL injection pen, Inject 75 Units under the skin daily, Disp: 5 pen, Rfl: 10    ipratropium-albuterol (DUO-NEB) 0 5-2 5 mg/3 mL nebulizer solution, Take 1 vial (3 mL total) by nebulization 4 (four) times a day, Disp: 120 vial, Rfl: 6    lisinopril (ZESTRIL) 40 mg tablet, TAKE 1 TABLET BY MOUTH EVERY DAY, Disp: 90 tablet, Rfl: 0    Magnesium 500 MG TABS, Take by mouth, Disp: , Rfl:     montelukast (SINGULAIR) 10 mg tablet, Take 1 tablet (10 mg total) by mouth daily at bedtime, Disp: 30 tablet, Rfl: 0    Multiple Vitamin (MULTI-VITAMIN DAILY PO), Take 1 tablet by mouth daily, Disp: , Rfl:     pantoprazole (PROTONIX) 40 mg tablet, Take 1 tablet (40 mg total) by mouth daily before breakfast, Disp: 90 tablet, Rfl: 3    potassium chloride (K-DUR,KLOR-CON) 10 mEq tablet, Take 1 tablet by mouth 2 (two) times a day, Disp: 60 tablet, Rfl: 0    rOPINIRole (REQUIP) 2 mg tablet, Take 1 tablet (2 mg total) by mouth 2 (two) times a day, Disp: 180 tablet, Rfl: 3    SYMBICORT 160-4 5 MCG/ACT inhaler, INHALE 2 PUFFS 2 (TWO) TIMES A DAY RINSE MOUTH AFTER USE , Disp: 30 6 Inhaler, Rfl: 3    Current Allergies     Allergies as of 03/03/2021 - Reviewed 03/03/2021   Allergen Reaction Noted    Other Cough and Nasal Congestion 08/16/2019            The following portions of the patient's history were reviewed and updated as appropriate: allergies, current medications, past family history, past medical history, past social history, past surgical history and problem list      Past Medical History:   Diagnosis Date    Asthma     Chronic pain     Diabetes mellitus ([address] )     Gout     Hyperlipidemia     Hypertension     Impaired fasting glucose     last assessed: 12/16/2015    Knee arthropathy     last assessed: 3/23/2016       Past Surgical History:   Procedure Laterality Date    KNEE ARTHROSCOPY Bilateral     KNEE SURGERY         Family History   Problem Relation Age of Onset    Coronary artery disease Father     Cancer Other         malignant neoplasm    Asthma Brother     No Known Problems Daughter     Asthma Mother     COPD Mother     Asthma Sister     No Known Problems Brother     Asthma Son     No Known Problems Son          Medications have been verified  Objective   /82   Pulse 98   Temp 98 7 °F (37 1 °C) (Temporal)   Ht 5' 11" (1 803 m)   Wt (!) 143 kg (315 lb)   SpO2 95%   BMI 43 93 kg/m²        Physical Exam     Physical Exam  Constitutional:       Appearance: He is well-developed  HENT:      Right Ear: Ear canal normal  Tympanic membrane is not injected  Left Ear: Ear canal normal  Tympanic membrane is not injected  Nose: Nose normal    Eyes:      General:         Right eye: No discharge  Left eye: No discharge  Conjunctiva/sclera: Conjunctivae normal       Pupils: Pupils are equal, round, and reactive to light  Neck:      Musculoskeletal: Normal range of motion and neck supple  Thyroid: No thyromegaly  Cardiovascular:      Rate and Rhythm: Normal rate and regular rhythm  Heart sounds: Normal heart sounds  No murmur  Pulmonary:      Effort: Pulmonary effort is normal  No respiratory distress  Breath sounds: Normal breath sounds  No wheezing  Abdominal:      General: Bowel sounds are normal  There is no distension  Palpations: Abdomen is soft  Tenderness: There is no abdominal tenderness  Musculoskeletal: Normal range of motion     Lymphadenopathy: Cervical: No cervical adenopathy  Skin:     General: Skin is warm and dry  Neurological:      Mental Status: He is alert and oriented to person, place, and time  He is not disoriented  Sensory: No sensory deficit  Gait: Gait normal       Deep Tendon Reflexes: Reflexes are normal and symmetric  Psychiatric:         Speech: Speech normal          Behavior: Behavior normal          Thought Content:  Thought content normal          Judgment: Judgment normal  [de-identified] : 31 year old female is here for a followup visit for mood, anxious, consent given\par also with congestion for 7 days, progressive\par patient overwhelmed, anxious, added buspar, did not like it, did not like abilify\par  [Home] : at home, [unfilled] , at the time of the visit. [Medical Office: ([facility])___] : at the medical office located in  [Verbal consent obtained from patient] : the patient, [unfilled]

## 2022-01-17 DIAGNOSIS — E11.9 TYPE 2 DIABETES MELLITUS WITHOUT COMPLICATION, WITH LONG-TERM CURRENT USE OF INSULIN (HCC): Chronic | ICD-10-CM

## 2022-01-17 DIAGNOSIS — E78.2 MIXED HYPERLIPIDEMIA: ICD-10-CM

## 2022-01-17 DIAGNOSIS — Z79.4 TYPE 2 DIABETES MELLITUS WITHOUT COMPLICATION, WITH LONG-TERM CURRENT USE OF INSULIN (HCC): Chronic | ICD-10-CM

## 2022-01-18 RX ORDER — DULAGLUTIDE 0.75 MG/.5ML
INJECTION, SOLUTION SUBCUTANEOUS
Qty: 4 ML | Refills: 11 | Status: SHIPPED | OUTPATIENT
Start: 2022-01-18 | End: 2022-01-31

## 2022-01-18 RX ORDER — ATORVASTATIN CALCIUM 20 MG/1
TABLET, FILM COATED ORAL
Qty: 30 TABLET | Refills: 0 | Status: SHIPPED | OUTPATIENT
Start: 2022-01-18 | End: 2022-01-31 | Stop reason: SDUPTHER

## 2022-01-19 DIAGNOSIS — E11.9 TYPE 2 DIABETES MELLITUS WITHOUT COMPLICATION, WITH LONG-TERM CURRENT USE OF INSULIN (HCC): Chronic | ICD-10-CM

## 2022-01-19 DIAGNOSIS — Z79.4 TYPE 2 DIABETES MELLITUS WITHOUT COMPLICATION, WITH LONG-TERM CURRENT USE OF INSULIN (HCC): Chronic | ICD-10-CM

## 2022-01-24 RX ORDER — GLIPIZIDE 5 MG/1
TABLET ORAL
Qty: 90 TABLET | Refills: 3 | Status: SHIPPED | OUTPATIENT
Start: 2022-01-24 | End: 2022-01-31 | Stop reason: SDUPTHER

## 2022-01-25 DIAGNOSIS — E03.9 ACQUIRED HYPOTHYROIDISM: ICD-10-CM

## 2022-01-25 DIAGNOSIS — R52 PAIN: ICD-10-CM

## 2022-01-25 PROCEDURE — 4010F ACE/ARB THERAPY RXD/TAKEN: CPT | Performed by: FAMILY MEDICINE

## 2022-01-25 RX ORDER — LISINOPRIL 40 MG/1
40 TABLET ORAL DAILY
Qty: 90 TABLET | Refills: 0 | Status: SHIPPED | OUTPATIENT
Start: 2022-01-25 | End: 2022-04-16

## 2022-01-25 RX ORDER — PREDNISONE 20 MG/1
TABLET ORAL
Qty: 15 TABLET | Refills: 1 | Status: SHIPPED | OUTPATIENT
Start: 2022-01-25

## 2022-01-28 LAB
ALBUMIN SERPL-MCNC: 4.6 G/DL (ref 3.8–4.9)
ALBUMIN/GLOB SERPL: 1.6 {RATIO} (ref 1.2–2.2)
ALP SERPL-CCNC: 109 IU/L (ref 44–121)
ALT SERPL-CCNC: 29 IU/L (ref 0–44)
AST SERPL-CCNC: 14 IU/L (ref 0–40)
BILIRUB SERPL-MCNC: 0.4 MG/DL (ref 0–1.2)
BUN SERPL-MCNC: 17 MG/DL (ref 6–24)
BUN/CREAT SERPL: 20 (ref 9–20)
CALCIUM SERPL-MCNC: 9.4 MG/DL (ref 8.7–10.2)
CHLORIDE SERPL-SCNC: 98 MMOL/L (ref 96–106)
CHOLEST SERPL-MCNC: 165 MG/DL (ref 100–199)
CO2 SERPL-SCNC: 27 MMOL/L (ref 20–29)
CREAT SERPL-MCNC: 0.86 MG/DL (ref 0.76–1.27)
GLOBULIN SER-MCNC: 2.8 G/DL (ref 1.5–4.5)
GLUCOSE SERPL-MCNC: 290 MG/DL (ref 65–99)
HBA1C MFR BLD: 9.8 % (ref 4.8–5.6)
HDLC SERPL-MCNC: 39 MG/DL
LDLC SERPL CALC-MCNC: 96 MG/DL (ref 0–99)
LDLC/HDLC SERPL: 2.5 RATIO (ref 0–3.6)
POTASSIUM SERPL-SCNC: 4.2 MMOL/L (ref 3.5–5.2)
PROT SERPL-MCNC: 7.4 G/DL (ref 6–8.5)
SL AMB EGFR AFRICAN AMERICAN: 110 ML/MIN/1.73
SL AMB EGFR NON AFRICAN AMERICAN: 96 ML/MIN/1.73
SL AMB VLDL CHOLESTEROL CALC: 30 MG/DL (ref 5–40)
SODIUM SERPL-SCNC: 142 MMOL/L (ref 134–144)
TRIGL SERPL-MCNC: 171 MG/DL (ref 0–149)

## 2022-01-28 PROCEDURE — 3046F HEMOGLOBIN A1C LEVEL >9.0%: CPT | Performed by: FAMILY MEDICINE

## 2022-01-31 ENCOUNTER — OFFICE VISIT (OUTPATIENT)
Dept: FAMILY MEDICINE CLINIC | Facility: CLINIC | Age: 57
End: 2022-01-31
Payer: COMMERCIAL

## 2022-01-31 VITALS
RESPIRATION RATE: 20 BRPM | HEIGHT: 71 IN | WEIGHT: 315 LBS | DIASTOLIC BLOOD PRESSURE: 86 MMHG | BODY MASS INDEX: 44.1 KG/M2 | SYSTOLIC BLOOD PRESSURE: 138 MMHG | HEART RATE: 98 BPM

## 2022-01-31 DIAGNOSIS — Z23 FLU VACCINE NEED: Primary | ICD-10-CM

## 2022-01-31 DIAGNOSIS — E11.65 TYPE 2 DIABETES MELLITUS WITH HYPERGLYCEMIA, WITHOUT LONG-TERM CURRENT USE OF INSULIN (HCC): ICD-10-CM

## 2022-01-31 DIAGNOSIS — E78.2 MIXED HYPERLIPIDEMIA: Chronic | ICD-10-CM

## 2022-01-31 DIAGNOSIS — Z79.4 TYPE 2 DIABETES MELLITUS WITHOUT COMPLICATION, WITH LONG-TERM CURRENT USE OF INSULIN (HCC): Chronic | ICD-10-CM

## 2022-01-31 DIAGNOSIS — E11.9 TYPE 2 DIABETES MELLITUS WITHOUT COMPLICATION, WITH LONG-TERM CURRENT USE OF INSULIN (HCC): Chronic | ICD-10-CM

## 2022-01-31 DIAGNOSIS — I10 PRIMARY HYPERTENSION: Chronic | ICD-10-CM

## 2022-01-31 DIAGNOSIS — J40 BRONCHITIS: ICD-10-CM

## 2022-01-31 DIAGNOSIS — J45.50 SEVERE PERSISTENT ASTHMA WITHOUT COMPLICATION: ICD-10-CM

## 2022-01-31 PROCEDURE — 90471 IMMUNIZATION ADMIN: CPT

## 2022-01-31 PROCEDURE — 3075F SYST BP GE 130 - 139MM HG: CPT | Performed by: FAMILY MEDICINE

## 2022-01-31 PROCEDURE — 90682 RIV4 VACC RECOMBINANT DNA IM: CPT

## 2022-01-31 PROCEDURE — 3079F DIAST BP 80-89 MM HG: CPT | Performed by: FAMILY MEDICINE

## 2022-01-31 PROCEDURE — 1036F TOBACCO NON-USER: CPT | Performed by: FAMILY MEDICINE

## 2022-01-31 PROCEDURE — 99214 OFFICE O/P EST MOD 30 MIN: CPT | Performed by: FAMILY MEDICINE

## 2022-01-31 PROCEDURE — 3008F BODY MASS INDEX DOCD: CPT | Performed by: FAMILY MEDICINE

## 2022-01-31 RX ORDER — ATORVASTATIN CALCIUM 20 MG/1
20 TABLET, FILM COATED ORAL DAILY
Qty: 90 TABLET | Refills: 3 | Status: SHIPPED | OUTPATIENT
Start: 2022-01-31

## 2022-01-31 RX ORDER — BUDESONIDE AND FORMOTEROL FUMARATE DIHYDRATE 160; 4.5 UG/1; UG/1
2 AEROSOL RESPIRATORY (INHALATION) 2 TIMES DAILY
Qty: 10.2 G | Refills: 3 | Status: SHIPPED | OUTPATIENT
Start: 2022-01-31 | End: 2022-06-09 | Stop reason: SDUPTHER

## 2022-01-31 RX ORDER — ALBUTEROL SULFATE 90 UG/1
2 AEROSOL, METERED RESPIRATORY (INHALATION) EVERY 4 HOURS PRN
Qty: 18 G | Refills: 2 | Status: SHIPPED | OUTPATIENT
Start: 2022-01-31

## 2022-01-31 RX ORDER — DULAGLUTIDE 1.5 MG/.5ML
1.5 INJECTION, SOLUTION SUBCUTANEOUS WEEKLY
Qty: 6 ML | Refills: 3 | Status: SHIPPED | OUTPATIENT
Start: 2022-01-31

## 2022-01-31 RX ORDER — GLIPIZIDE 5 MG/1
5 TABLET ORAL
Qty: 180 TABLET | Refills: 3 | Status: SHIPPED | OUTPATIENT
Start: 2022-01-31

## 2022-01-31 NOTE — PROGRESS NOTES
Assessment/Plan:    No problem-specific Assessment & Plan notes found for this encounter  Diagnoses and all orders for this visit:    Flu vaccine need  -     influenza vaccine, quadrivalent, recombinant, PF, 0 5 mL, for patients 18 yr+ ([facility])    Type 2 diabetes mellitus with hyperglycemia, without long-term current use of insulin (HCC)  -     Dulaglutide (Trulicity) 1 5 GV/8 5QJ SOPN; Inject 0 5 mL (1 5 mg total) under the skin once a week    Severe persistent asthma without complication    Primary hypertension    Mixed hyperlipidemia  -     atorvastatin (LIPITOR) 20 mg tablet; Take 1 tablet (20 mg total) by mouth daily    Type 2 diabetes mellitus without complication, with long-term current use of insulin ([facility])  -     glipiZIDE (GLUCOTROL) 5 mg tablet; Take 1 tablet (5 mg total) by mouth 2 (two) times a day before meals  -     Comprehensive metabolic panel; Future  -     Hemoglobin A1c (w/out EAG); Future  -     Comprehensive metabolic panel  -     Hemoglobin A1c (w/out EAG)  -     Ambulatory referral to Diabetic Education; Future    Bronchitis  -     budesonide-formoterol (Symbicort) 160-4 5 mcg/act inhaler; Inhale 2 puffs 2 (two) times a day Rinse mouth after use  -     albuterol (PROVENTIL HFA,VENTOLIN HFA) 90 mcg/act inhaler; Inhale 2 puffs every 4 (four) hours as needed for wheezing          Subjective:   Chief Complaint   Patient presents with    Follow-up     MM/LABS        Patient ID: Karlee Garibay is a 61 y o  male  DM not controlled but improved      The following portions of the patient's history were reviewed and updated as appropriate: allergies, current medications, past family history, past medical history, past social history, past surgical history and problem list     Review of Systems   Constitutional: Negative for fatigue, fever and unexpected weight change  HENT: Negative for congestion, sinus pain and sore throat  Eyes: Negative for visual disturbance     Respiratory: Negative for shortness of breath and wheezing  Cardiovascular: Negative for chest pain and palpitations  Gastrointestinal: Negative for abdominal pain, nausea and vomiting  Musculoskeletal: Negative  Negative for arthralgias and myalgias  Neurological: Negative for syncope, weakness and numbness  Psychiatric/Behavioral: Negative  Negative for confusion, dysphoric mood and suicidal ideas  Objective:  Vitals:    01/31/22 0721   BP: 138/86   Pulse: 98   Resp: 20   Weight: (!) 143 kg (316 lb)   Height: 5' 11" (1 803 m)      Physical Exam  Constitutional:       Appearance: He is well-developed  HENT:      Right Ear: Ear canal normal  Tympanic membrane is not injected  Left Ear: Ear canal normal  Tympanic membrane is not injected  Nose: Nose normal    Eyes:      General:         Right eye: No discharge  Left eye: No discharge  Conjunctiva/sclera: Conjunctivae normal       Pupils: Pupils are equal, round, and reactive to light  Neck:      Thyroid: No thyromegaly  Cardiovascular:      Rate and Rhythm: Normal rate and regular rhythm  Pulses: no weak pulses          Dorsalis pedis pulses are 2+ on the right side and 2+ on the left side  Posterior tibial pulses are 2+ on the right side and 2+ on the left side  Heart sounds: Normal heart sounds  No murmur heard  Pulmonary:      Effort: Pulmonary effort is normal  No respiratory distress  Breath sounds: Normal breath sounds  No wheezing  Abdominal:      General: Bowel sounds are normal  There is no distension  Palpations: Abdomen is soft  Tenderness: There is no abdominal tenderness  Musculoskeletal:         General: Normal range of motion  Cervical back: Normal range of motion and neck supple  Feet:      Right foot:      Skin integrity: No ulcer, skin breakdown, erythema, warmth, callus or dry skin        Left foot:      Skin integrity: No ulcer, skin breakdown, erythema, warmth, callus or dry skin  Lymphadenopathy:      Cervical: No cervical adenopathy  Skin:     General: Skin is warm and dry  Neurological:      Mental Status: He is alert and oriented to person, place, and time  He is not disoriented  Sensory: No sensory deficit  Gait: Gait normal       Deep Tendon Reflexes: Reflexes are normal and symmetric  Psychiatric:         Speech: Speech normal          Behavior: Behavior normal          Thought Content: Thought content normal          Judgment: Judgment normal        Patient's shoes and socks removed  Right Foot/Ankle   Right Foot Inspection  Skin Exam: skin normal and skin intact  No dry skin, no warmth, no callus, no erythema, no maceration, no abnormal color, no pre-ulcer, no ulcer and no callus  Toe Exam: ROM and strength within normal limits  Sensory   Vibration: intact  Proprioception: intact  Monofilament testing: intact    Vascular  Capillary refills: < 3 seconds  The right DP pulse is 2+  The right PT pulse is 2+  Left Foot/Ankle  Left Foot Inspection  Skin Exam: skin normal and skin intact  No dry skin, no warmth, no erythema, no maceration, normal color, no pre-ulcer, no ulcer and no callus  Toe Exam: ROM and strength within normal limits  Sensory   Vibration: intact  Proprioception: intact  Monofilament testing: intact    Vascular  Capillary refills: < 3 seconds  The left DP pulse is 2+  The left PT pulse is 2+       Assign Risk Category  No deformity present  No loss of protective sensation  No weak pulses  Risk: 0

## 2022-03-12 ENCOUNTER — HOSPITAL ENCOUNTER (OUTPATIENT)
Dept: RADIOLOGY | Facility: HOSPITAL | Age: 57
Discharge: HOME | End: 2022-03-12
Payer: COMMERCIAL

## 2022-03-12 DIAGNOSIS — M25.511 PAIN OF RIGHT SHOULDER JOINT ON MOVEMENT: ICD-10-CM

## 2022-03-12 DIAGNOSIS — G89.29 CHRONIC RIGHT SHOULDER PAIN: ICD-10-CM

## 2022-03-12 DIAGNOSIS — M25.511 CHRONIC RIGHT SHOULDER PAIN: ICD-10-CM

## 2022-03-12 PROCEDURE — 73030 X-RAY EXAM OF SHOULDER: CPT

## 2022-04-16 DIAGNOSIS — E03.9 ACQUIRED HYPOTHYROIDISM: ICD-10-CM

## 2022-04-16 RX ORDER — LISINOPRIL 40 MG/1
TABLET ORAL
Qty: 30 TABLET | Refills: 2 | Status: SHIPPED | OUTPATIENT
Start: 2022-04-16 | End: 2022-07-09

## 2022-06-09 DIAGNOSIS — J40 BRONCHITIS: ICD-10-CM

## 2022-06-09 RX ORDER — BUDESONIDE AND FORMOTEROL FUMARATE DIHYDRATE 160; 4.5 UG/1; UG/1
2 AEROSOL RESPIRATORY (INHALATION) 2 TIMES DAILY
Qty: 10.2 G | Refills: 3 | Status: SHIPPED | OUTPATIENT
Start: 2022-06-09

## 2022-07-09 DIAGNOSIS — G25.81 RESTLESS LEG: ICD-10-CM

## 2022-07-09 DIAGNOSIS — E03.9 ACQUIRED HYPOTHYROIDISM: ICD-10-CM

## 2022-07-09 RX ORDER — LISINOPRIL 40 MG/1
TABLET ORAL
Qty: 30 TABLET | Refills: 2 | Status: SHIPPED | OUTPATIENT
Start: 2022-07-09 | End: 2022-08-07

## 2022-07-09 RX ORDER — ROPINIROLE 2 MG/1
TABLET, FILM COATED ORAL
Qty: 180 TABLET | Refills: 3 | Status: SHIPPED | OUTPATIENT
Start: 2022-07-09 | End: 2022-09-19 | Stop reason: SDUPTHER

## 2022-08-07 DIAGNOSIS — E03.9 ACQUIRED HYPOTHYROIDISM: ICD-10-CM

## 2022-08-07 RX ORDER — LISINOPRIL 40 MG/1
TABLET ORAL
Qty: 90 TABLET | Refills: 1 | Status: SHIPPED | OUTPATIENT
Start: 2022-08-07 | End: 2022-09-19 | Stop reason: SDUPTHER

## 2022-09-19 DIAGNOSIS — E11.9 TYPE 2 DIABETES MELLITUS WITHOUT COMPLICATION, WITH LONG-TERM CURRENT USE OF INSULIN (HCC): Chronic | ICD-10-CM

## 2022-09-19 DIAGNOSIS — E03.9 ACQUIRED HYPOTHYROIDISM: ICD-10-CM

## 2022-09-19 DIAGNOSIS — E78.2 MIXED HYPERLIPIDEMIA: Chronic | ICD-10-CM

## 2022-09-19 DIAGNOSIS — Z79.4 TYPE 2 DIABETES MELLITUS WITHOUT COMPLICATION, WITH LONG-TERM CURRENT USE OF INSULIN (HCC): Chronic | ICD-10-CM

## 2022-09-19 DIAGNOSIS — E11.65 TYPE 2 DIABETES MELLITUS WITH HYPERGLYCEMIA, WITHOUT LONG-TERM CURRENT USE OF INSULIN (HCC): ICD-10-CM

## 2022-09-19 DIAGNOSIS — G25.81 RESTLESS LEG: ICD-10-CM

## 2022-09-19 DIAGNOSIS — M10.9 GOUT, UNSPECIFIED CAUSE, UNSPECIFIED CHRONICITY, UNSPECIFIED SITE: ICD-10-CM

## 2022-09-19 RX ORDER — ATORVASTATIN CALCIUM 20 MG/1
20 TABLET, FILM COATED ORAL DAILY
Qty: 90 TABLET | Refills: 0 | Status: SHIPPED | OUTPATIENT
Start: 2022-09-19

## 2022-09-19 NOTE — TELEPHONE ENCOUNTER
Medication Refill Request     Name Dulaglutide (Trulicity) 1 5 DF/4 2SG SOPN  Dose/Frequency weekly  Quantity 6ML  Verified pharmacy   [x]  Verified ordering Provider   [x]  Does patient have enough for the next 3 days? Yes [x] No []    Medication Refill Request     Name rOPINIRole (REQUIP) 2 mg tablet  Dose/Frequency 2 x daily  Quantity 180  Verified pharmacy   [x]  Verified ordering Provider   [x]  Does patient have enough for the next 3 days? Yes [x] No []    Medication Refill Request     Name lisinopril (ZESTRIL) 40 mg tablet  Dose/Frequency 1 daily  Quantity 90  Verified pharmacy   [x]  Verified ordering Provider   [x]  Does patient have enough for the next 3 days? Yes [x] No []    Medication Refill Request     Name atorvastatin (LIPITOR) 20 mg tablet  Dose/Frequency 1 daily  Quantity 90  Verified pharmacy   [x]  Verified ordering Provider   [x]  Does patient have enough for the next 3 days? Yes [x] No []    Medication Refill Request     Name glipiZIDE (GLUCOTROL) 5 mg tablet  Dose/Frequency 2x daily  Quantity 180  Verified pharmacy   [x]  Verified ordering Provider   [x]  Does patient have enough for the next 3 days? Yes [x] No []    Medication Refill Request     Name allopurinol (ZYLOPRIM) 100 mg tablet  Dose/Frequency 2 tablets daily  Quantity 180  Verified pharmacy   [x]  Verified ordering Provider   [x]  Does patient have enough for the next 3 days?  Yes [x] No []

## 2022-09-20 RX ORDER — DULAGLUTIDE 1.5 MG/.5ML
1.5 INJECTION, SOLUTION SUBCUTANEOUS WEEKLY
Qty: 6 ML | Refills: 0 | Status: SHIPPED | OUTPATIENT
Start: 2022-09-20

## 2022-09-20 RX ORDER — LISINOPRIL 40 MG/1
40 TABLET ORAL DAILY
Qty: 90 TABLET | Refills: 0 | Status: SHIPPED | OUTPATIENT
Start: 2022-09-20

## 2022-09-20 RX ORDER — ALLOPURINOL 100 MG/1
200 TABLET ORAL DAILY
Qty: 180 TABLET | Refills: 0 | Status: SHIPPED | OUTPATIENT
Start: 2022-09-20

## 2022-09-20 RX ORDER — GLIPIZIDE 5 MG/1
5 TABLET ORAL
Qty: 180 TABLET | Refills: 0 | Status: SHIPPED | OUTPATIENT
Start: 2022-09-20

## 2022-09-20 RX ORDER — ROPINIROLE 2 MG/1
2 TABLET, FILM COATED ORAL 2 TIMES DAILY
Qty: 180 TABLET | Refills: 0 | Status: SHIPPED | OUTPATIENT
Start: 2022-09-20

## 2022-10-05 DIAGNOSIS — J40 BRONCHITIS: ICD-10-CM

## 2022-10-05 RX ORDER — BUDESONIDE AND FORMOTEROL FUMARATE DIHYDRATE 160; 4.5 UG/1; UG/1
AEROSOL RESPIRATORY (INHALATION)
Qty: 10.2 G | Refills: 3 | Status: SHIPPED | OUTPATIENT
Start: 2022-10-05

## 2022-10-25 ENCOUNTER — NURSE TRIAGE (OUTPATIENT)
Dept: OTHER | Facility: OTHER | Age: 57
End: 2022-10-25

## 2022-10-25 NOTE — TELEPHONE ENCOUNTER
Patient offered an office visit but declined  Patient wanted an appointment later in the week for a later time   Patient advised to go to urgent care if he cannot be seen before 5pm

## 2022-10-25 NOTE — TELEPHONE ENCOUNTER
Reason for Disposition  • Cough with no complications    Answer Assessment - Initial Assessment Questions  1  ONSET: "When did the cough begin?"       Started last week  3  SPUTUM: "Describe the color of your sputum" (none, dry cough; clear, white, yellow, green)      Yellow  4  HEMOPTYSIS: "Are you coughing up any blood?" If so ask: "How much?" (flecks, streaks, tablespoons, etc )      Denies  5  DIFFICULTY BREATHING: "Are you having difficulty breathing?" If Yes, ask: "How bad is it?" (e g , mild, moderate, severe)     - MILD: No SOB at rest, mild SOB with walking, speaks normally in sentences, can lay down, no retractions, pulse < 100      - MODERATE: SOB at rest, SOB with minimal exertion and prefers to sit, cannot lie down flat, speaks in phrases, mild retractions, audible wheezing, pulse 100-120      - SEVERE: Very SOB at rest, speaks in single words, struggling to breathe, sitting hunched forward, retractions, pulse > 120       Denies SOB  6  FEVER: "Do you have a fever?" If Yes, ask: "What is your temperature, how was it measured, and when did it start?"      Denies  7  CARDIAC HISTORY: "Do you have any history of heart disease?" (e g , heart attack, congestive heart failure)       Denies  8  LUNG HISTORY: "Do you have any history of lung disease?"  (e g , pulmonary embolus, asthma, emphysema)      Asthma   9  PE RISK FACTORS: "Do you have a history of blood clots?" (or: recent major surgery, recent prolonged travel, bedridden)      Denies   10   OTHER SYMPTOMS: "Do you have any other symptoms?" (e g , runny nose, wheezing, chest pain)  COngested    Allergy medicine    Protocols used: COUGH-ADULT-OH

## 2022-10-25 NOTE — TELEPHONE ENCOUNTER
Regarding: bad cough w/ mucus  ----- Message from Loli Camacho sent at 10/25/2022  8:14 AM EDT -----  " Mone Book been experiencing this bad cough with mucus for about a week now, I was trying to let it pass but it doesn't seem to be going away   I wanted to know if my doctor can call something into my  Pharmacy or do I have to make an appointment?"

## 2022-12-16 DIAGNOSIS — E78.2 MIXED HYPERLIPIDEMIA: Chronic | ICD-10-CM

## 2022-12-16 DIAGNOSIS — Z79.4 TYPE 2 DIABETES MELLITUS WITHOUT COMPLICATION, WITH LONG-TERM CURRENT USE OF INSULIN (HCC): Chronic | ICD-10-CM

## 2022-12-16 DIAGNOSIS — E03.9 ACQUIRED HYPOTHYROIDISM: ICD-10-CM

## 2022-12-16 DIAGNOSIS — E11.9 TYPE 2 DIABETES MELLITUS WITHOUT COMPLICATION, WITH LONG-TERM CURRENT USE OF INSULIN (HCC): Chronic | ICD-10-CM

## 2022-12-16 DIAGNOSIS — M10.9 GOUT, UNSPECIFIED CAUSE, UNSPECIFIED CHRONICITY, UNSPECIFIED SITE: ICD-10-CM

## 2022-12-16 DIAGNOSIS — G25.81 RESTLESS LEG: ICD-10-CM

## 2022-12-16 RX ORDER — ATORVASTATIN CALCIUM 20 MG/1
TABLET, FILM COATED ORAL
Qty: 90 TABLET | Refills: 0 | Status: SHIPPED | OUTPATIENT
Start: 2022-12-16

## 2022-12-16 RX ORDER — LISINOPRIL 40 MG/1
TABLET ORAL
Qty: 90 TABLET | Refills: 0 | Status: SHIPPED | OUTPATIENT
Start: 2022-12-16

## 2022-12-16 RX ORDER — GLIPIZIDE 5 MG/1
TABLET ORAL
Qty: 180 TABLET | Refills: 0 | Status: SHIPPED | OUTPATIENT
Start: 2022-12-16

## 2022-12-16 RX ORDER — ROPINIROLE 2 MG/1
TABLET, FILM COATED ORAL
Qty: 180 TABLET | Refills: 0 | Status: SHIPPED | OUTPATIENT
Start: 2022-12-16

## 2022-12-16 RX ORDER — ALLOPURINOL 100 MG/1
TABLET ORAL
Qty: 180 TABLET | Refills: 0 | Status: SHIPPED | OUTPATIENT
Start: 2022-12-16

## 2022-12-21 DIAGNOSIS — J40 BRONCHITIS: ICD-10-CM

## 2022-12-21 RX ORDER — ALBUTEROL SULFATE 90 UG/1
2 AEROSOL, METERED RESPIRATORY (INHALATION) EVERY 4 HOURS PRN
Qty: 18 G | Refills: 0 | Status: SHIPPED | OUTPATIENT
Start: 2022-12-21

## 2022-12-21 RX ORDER — BUDESONIDE AND FORMOTEROL FUMARATE DIHYDRATE 160; 4.5 UG/1; UG/1
2 AEROSOL RESPIRATORY (INHALATION) 2 TIMES DAILY
Qty: 10.2 G | Refills: 0 | Status: SHIPPED | OUTPATIENT
Start: 2022-12-21

## 2023-01-02 ENCOUNTER — NURSE TRIAGE (OUTPATIENT)
Dept: OTHER | Facility: OTHER | Age: 58
End: 2023-01-02

## 2023-01-02 DIAGNOSIS — E11.65 TYPE 2 DIABETES MELLITUS WITH HYPERGLYCEMIA, WITHOUT LONG-TERM CURRENT USE OF INSULIN (HCC): Primary | ICD-10-CM

## 2023-01-02 NOTE — TELEPHONE ENCOUNTER
Patient made of script sent in and if he can't get by Wednesday he needs to call office  He is to eat less carbs today as well  He was also reminded he is due for OV  Answer Assessment - Initial Assessment Questions  1  NAME of MEDICATION: "What medicine are you calling about?"      Trulicity  2  QUESTION: "What is your question?" (e g , medication refill, side effect)      I am out of Trulicity and am due to take it today   3  PRESCRIBING HCP: "Who prescribed it?" Reason: if prescribed by specialist, call should be referred to that group  Dr Stephanie Camacho     I did try to refill and not sure I can get it      Protocols used: MEDICATION QUESTION CALL-ADULT-

## 2023-01-02 NOTE — TELEPHONE ENCOUNTER
Regarding: out of Trulicity  ----- Message from Arturo Manual sent at 1/2/2023  8:51 AM EST -----  " I ran out of my Trulicity and I am due for my injection today   What should I do?"

## 2023-01-31 DIAGNOSIS — Z79.4 TYPE 2 DIABETES MELLITUS WITHOUT COMPLICATION, WITH LONG-TERM CURRENT USE OF INSULIN (HCC): Primary | ICD-10-CM

## 2023-01-31 DIAGNOSIS — E11.9 TYPE 2 DIABETES MELLITUS WITHOUT COMPLICATION, WITH LONG-TERM CURRENT USE OF INSULIN (HCC): Primary | ICD-10-CM

## 2023-02-04 LAB
ALBUMIN SERPL-MCNC: 4.6 G/DL (ref 3.8–4.9)
ALBUMIN/GLOB SERPL: 1.5 {RATIO} (ref 1.2–2.2)
ALP SERPL-CCNC: 100 IU/L (ref 44–121)
ALT SERPL-CCNC: 36 IU/L (ref 0–44)
AST SERPL-CCNC: 20 IU/L (ref 0–40)
BILIRUB SERPL-MCNC: 0.5 MG/DL (ref 0–1.2)
BUN SERPL-MCNC: 17 MG/DL (ref 6–24)
BUN/CREAT SERPL: 20 (ref 9–20)
CALCIUM SERPL-MCNC: 9.7 MG/DL (ref 8.7–10.2)
CHLORIDE SERPL-SCNC: 98 MMOL/L (ref 96–106)
CHOLEST SERPL-MCNC: 155 MG/DL (ref 100–199)
CO2 SERPL-SCNC: 28 MMOL/L (ref 20–29)
CREAT SERPL-MCNC: 0.85 MG/DL (ref 0.76–1.27)
EGFR: 100 ML/MIN/1.73
GLOBULIN SER-MCNC: 3 G/DL (ref 1.5–4.5)
GLUCOSE SERPL-MCNC: 127 MG/DL (ref 70–99)
HBA1C MFR BLD: 7 % (ref 4.8–5.6)
HDLC SERPL-MCNC: 45 MG/DL
LDLC SERPL CALC-MCNC: 80 MG/DL (ref 0–99)
LDLC/HDLC SERPL: 1.8 RATIO (ref 0–3.6)
POTASSIUM SERPL-SCNC: 4.4 MMOL/L (ref 3.5–5.2)
PROT SERPL-MCNC: 7.6 G/DL (ref 6–8.5)
SL AMB VLDL CHOLESTEROL CALC: 30 MG/DL (ref 5–40)
SODIUM SERPL-SCNC: 140 MMOL/L (ref 134–144)
TRIGL SERPL-MCNC: 176 MG/DL (ref 0–149)

## 2023-02-07 ENCOUNTER — OFFICE VISIT (OUTPATIENT)
Dept: FAMILY MEDICINE CLINIC | Facility: CLINIC | Age: 58
End: 2023-02-07

## 2023-02-07 VITALS
OXYGEN SATURATION: 97 % | HEIGHT: 71 IN | HEART RATE: 100 BPM | DIASTOLIC BLOOD PRESSURE: 84 MMHG | WEIGHT: 315 LBS | SYSTOLIC BLOOD PRESSURE: 136 MMHG | TEMPERATURE: 97.4 F | BODY MASS INDEX: 44.1 KG/M2

## 2023-02-07 DIAGNOSIS — E11.65 TYPE 2 DIABETES MELLITUS WITH HYPERGLYCEMIA, WITHOUT LONG-TERM CURRENT USE OF INSULIN (HCC): ICD-10-CM

## 2023-02-07 DIAGNOSIS — Z00.00 WELLNESS EXAMINATION: Primary | ICD-10-CM

## 2023-02-07 DIAGNOSIS — M10.00 IDIOPATHIC GOUT, UNSPECIFIED CHRONICITY, UNSPECIFIED SITE: ICD-10-CM

## 2023-02-07 DIAGNOSIS — J45.50 SEVERE PERSISTENT ASTHMA WITHOUT COMPLICATION: ICD-10-CM

## 2023-02-07 DIAGNOSIS — J40 BRONCHITIS: ICD-10-CM

## 2023-02-07 RX ORDER — BUDESONIDE AND FORMOTEROL FUMARATE DIHYDRATE 160; 4.5 UG/1; UG/1
2 AEROSOL RESPIRATORY (INHALATION) 2 TIMES DAILY
Qty: 10.2 G | Refills: 5 | Status: SHIPPED | OUTPATIENT
Start: 2023-02-07

## 2023-02-07 RX ORDER — FLUTICASONE PROPIONATE AND SALMETEROL 250; 50 UG/1; UG/1
1 POWDER RESPIRATORY (INHALATION) 2 TIMES DAILY
Qty: 180 BLISTER | Refills: 3 | Status: SHIPPED | OUTPATIENT
Start: 2023-02-07 | End: 2023-02-07 | Stop reason: SDUPTHER

## 2023-02-07 RX ORDER — FLUTICASONE PROPIONATE AND SALMETEROL 250; 50 UG/1; UG/1
1 POWDER RESPIRATORY (INHALATION) 2 TIMES DAILY
Qty: 180 BLISTER | Refills: 3 | Status: SHIPPED | OUTPATIENT
Start: 2023-02-07 | End: 2024-02-02

## 2023-02-07 NOTE — PROGRESS NOTES
HPI:  Thomas Elise is a 60 y o  male here for his yearly health maintenance exam    Patient Active Problem List   Diagnosis   • Hyponatremia   • Diabetes mellitus, type 2 ([facility] )   • Hyperglycemia due to type 2 diabetes mellitus ([facility] )   • Weakness generalized   • Hypokalemia   • Hypertension   • Hyperlipidemia   • Chronic pain syndrome   • Severe persistent allergic asthma with acute exacerbation   • Obstructive sleep apnea on CPAP   • Severe persistent asthma without complication   • Vasomotor rhinitis   • Gout     Past Medical History:   Diagnosis Date   • Asthma    • Chronic pain    • Diabetes mellitus ([facility] )    • Gout    • Hyperlipidemia    • Hypertension    • Impaired fasting glucose     last assessed: 12/16/2015   • Knee arthropathy     last assessed: 3/23/2016       1  Advanced Directive: n     2  Durable Power of  for Healthcare: n     3  Social History:           Drug and alcohol History: n                  4  Immunizations up to date: y                 Lifestyle:                           Healthy Diet:y                          Alcohol Use:n                          Tobacco Use:n                          Regular exercise:y                          Weight concerns:y                               5   Over the past 2 weeks, how often have you been bothered by the following:              Little interest or pleasure in doing things:y              Felling down, depressed or hopeless:n       Current Outpatient Medications   Medication Sig Dispense Refill   • albuterol (PROVENTIL HFA,VENTOLIN HFA) 90 mcg/act inhaler Inhale 2 puffs every 4 (four) hours as needed for wheezing 18 g 0   • allopurinol (ZYLOPRIM) 100 mg tablet take 2 tablets by mouth once daily 180 tablet 0   • atorvastatin (LIPITOR) 20 mg tablet take 1 tablet by mouth once daily 90 tablet 0   • budesonide-formoterol (Symbicort) 160-4 5 mcg/act inhaler Inhale 2 puffs 2 (two) times a day Rinse mouth after use 10 2 g 5   • cetirizine (ZyrTEC) 10 mg tablet Take 10 mg by mouth daily     • Cyanocobalamin (VITAMIN B 12 PO) Take by mouth     • Dulaglutide (Trulicity) 1 5 IQ/7 1PC SOPN Inject 0 5 mL (1 5 mg total) under the skin once a week 6 mL 0   • Dulaglutide 1 5 MG/0 5ML SOPN Inject 0 5 mL (1 5 mg total) under the skin once a week 6 mL 0   • fluticasone-salmeterol (Advair Diskus) 250-50 mcg/dose inhaler Inhale 1 puff 2 (two) times a day Rinse mouth after use  1 each 10   • glipiZIDE (GLUCOTROL) 5 mg tablet take 1 tablet by mouth twice a day BEFORE MEALS 180 tablet 0   • HYDROcodone-acetaminophen (NORCO)  mg per tablet Take 1 tablet by mouth every 4 (four) hours as needed     • hylan (SYNVISC) injection Inject into the joint     • indomethacin (INDOCIN) 25 mg capsule Take by mouth every 8 (eight) hours     • ipratropium-albuterol (DUO-NEB) 0 5-2 5 mg/3 mL nebulizer solution Take 1 vial (3 mL total) by nebulization 4 (four) times a day 120 vial 6   • lisinopril (ZESTRIL) 40 mg tablet take 1 tablet by mouth once daily 90 tablet 0   • Magnesium 500 MG TABS Take by mouth     • Multiple Vitamin (MULTI-VITAMIN DAILY PO) Take 1 tablet by mouth daily     • potassium chloride (K-DUR,KLOR-CON) 10 mEq tablet Take 1 tablet by mouth 2 (two) times a day 60 tablet 0   • predniSONE 20 mg tablet TAKE 1 TABLET BID X 5 DAYS THEN TAKE 1 TABLET QD FOR 5 DAYS 15 tablet 1   • rOPINIRole (REQUIP) 2 mg tablet take 1 tablet by mouth twice a day 180 tablet 0   • montelukast (SINGULAIR) 10 mg tablet Take 1 tablet (10 mg total) by mouth daily at bedtime 30 tablet 0     No current facility-administered medications for this visit       Allergies   Allergen Reactions   • Other Cough and Nasal Congestion     Seasonal allergies      Immunization History   Administered Date(s) Administered   • COVID-19 MODERNA VACC 0 5 ML IM 05/15/2021, 06/11/2021   • INFLUENZA 09/03/2014, 12/16/2015, 09/02/2016   • Influenza, recombinant, quadrivalent,injectable, preservative free 10/30/2019, 09/30/2020, 01/31/2022   • Influenza, seasonal, injectable 09/03/2014, 09/02/2016, 08/30/2017   • Pneumococcal Conjugate 13-Valent 08/30/2017   • Pneumococcal Polysaccharide PPV23 08/12/2013   • Tdap 07/28/2018       Patient Care Team:  Kee Conrad MD as PCP - MD Mihai Loyola MD    Review of Systems   Constitutional: Negative for fatigue, fever and unexpected weight change  HENT: Negative for congestion, sinus pain and sore throat  Eyes: Negative for visual disturbance  Respiratory: Negative for shortness of breath and wheezing  Cardiovascular: Negative for chest pain and palpitations  Gastrointestinal: Negative for abdominal pain, nausea and vomiting  Musculoskeletal: Negative  Negative for arthralgias and myalgias  Neurological: Negative for syncope, weakness and numbness  Psychiatric/Behavioral: Negative  Negative for confusion, dysphoric mood and suicidal ideas  Physical Exam :  Physical Exam  Constitutional:       Appearance: He is well-developed  HENT:      Right Ear: Ear canal normal  Tympanic membrane is not injected  Left Ear: Ear canal normal  Tympanic membrane is not injected  Nose: Nose normal    Eyes:      General:         Right eye: No discharge  Left eye: No discharge  Conjunctiva/sclera: Conjunctivae normal       Pupils: Pupils are equal, round, and reactive to light  Neck:      Thyroid: No thyromegaly  Cardiovascular:      Rate and Rhythm: Normal rate and regular rhythm  Heart sounds: Normal heart sounds  No murmur heard  Pulmonary:      Effort: Pulmonary effort is normal  No respiratory distress  Breath sounds: Normal breath sounds  No wheezing  Abdominal:      General: Bowel sounds are normal  There is no distension  Palpations: Abdomen is soft  Tenderness: There is no abdominal tenderness  Musculoskeletal:         General: Normal range of motion        Cervical back: Normal range of motion and neck supple  Lymphadenopathy:      Cervical: No cervical adenopathy  Skin:     General: Skin is warm and dry  Neurological:      Mental Status: He is alert and oriented to person, place, and time  He is not disoriented  Sensory: No sensory deficit  Gait: Gait normal       Deep Tendon Reflexes: Reflexes are normal and symmetric  Psychiatric:         Speech: Speech normal          Behavior: Behavior normal          Thought Content: Thought content normal          Judgment: Judgment normal            Assessment and Plan:  1  Wellness examination        2  Type 2 diabetes mellitus with hyperglycemia, without long-term current use of insulin (HCC)  Hemoglobin A1C    Comprehensive metabolic panel      3  Severe persistent asthma without complication        4   Bronchitis  budesonide-formoterol (Symbicort) 160-4 5 mcg/act inhaler          Health Maintenance Due   Topic Date Due   • Hepatitis C Screening  Never done   • BMI: Adult  Never done   • Kidney Health Evaluation: Microalbumin/Creatinine Ratio  02/17/2018   • BMI: Followup Plan  02/22/2021   • COVID-19 Vaccine (3 - Booster for Moderna series) 08/06/2021   • Influenza Vaccine (1) 09/01/2022   • Diabetic Foot Exam  04/07/2023

## 2023-02-07 NOTE — PROGRESS NOTES
Assessment/Plan:    No problem-specific Assessment & Plan notes found for this encounter  Diagnoses and all orders for this visit:    Wellness examination    Type 2 diabetes mellitus with hyperglycemia, without long-term current use of insulin ([facility] )  -     Hemoglobin A1C; Future  -     Comprehensive metabolic panel; Future    Severe persistent asthma without complication    Bronchitis  -     budesonide-formoterol (Symbicort) 160-4 5 mcg/act inhaler; Inhale 2 puffs 2 (two) times a day Rinse mouth after use          Subjective:   Chief Complaint   Patient presents with   • Physical Exam     Labs review    Congestion        Patient ID: Anant Rogel is a 60 y o  male  HPI    The following portions of the patient's history were reviewed and updated as appropriate: allergies, current medications, past family history, past medical history, past social history, past surgical history and problem list     Review of Systems   Constitutional: Negative for appetite change and fatigue  HENT: Negative for ear pain, postnasal drip, sinus pressure and sore throat  Eyes: Negative for redness and visual disturbance  Respiratory: Negative for cough, chest tightness, shortness of breath and wheezing  Cardiovascular: Negative for chest pain, palpitations and leg swelling  Gastrointestinal: Negative for abdominal pain, blood in stool, constipation, diarrhea, nausea and vomiting  Genitourinary: Negative for difficulty urinating, flank pain, hematuria and urgency  Musculoskeletal: Negative for arthralgias, back pain, joint swelling and myalgias  Skin: Negative for rash and wound  No suspicious skin lesions   Neurological: Negative for light-headedness, numbness and headaches  Psychiatric/Behavioral: Negative for confusion, decreased concentration, sleep disturbance and suicidal ideas  The patient is not nervous/anxious            Objective:  Vitals:    02/07/23 1451   BP: 136/84   BP Location: Left arm Patient Position: Sitting   Cuff Size: Standard   Pulse: 100   Temp: (!) 97 4 °F (36 3 °C)   TempSrc: Tympanic   SpO2: 97%   Weight: (!) 160 kg (353 lb 6 4 oz)   Height: 5' 11" (1 803 m)      Physical Exam  Constitutional:       General: He is not in acute distress  Appearance: He is well-developed  He is not diaphoretic  HENT:      Right Ear: Ear canal normal  Tympanic membrane is not injected  Left Ear: Ear canal normal  Tympanic membrane is not injected  Nose: Nose normal       Mouth/Throat:      Pharynx: No oropharyngeal exudate  Eyes:      Conjunctiva/sclera: Conjunctivae normal       Pupils: Pupils are equal, round, and reactive to light  Neck:      Thyroid: No thyromegaly  Cardiovascular:      Rate and Rhythm: Normal rate and regular rhythm  Pulses: no weak pulses          Dorsalis pedis pulses are 2+ on the right side and 2+ on the left side  Posterior tibial pulses are 2+ on the right side and 2+ on the left side  Heart sounds: Normal heart sounds  No murmur heard  Pulmonary:      Effort: Pulmonary effort is normal  No respiratory distress  Breath sounds: Normal breath sounds  No wheezing  Abdominal:      General: Bowel sounds are normal       Palpations: Abdomen is soft  There is no hepatomegaly, splenomegaly or mass  Tenderness: There is no abdominal tenderness  Musculoskeletal:         General: No tenderness or deformity  Normal range of motion  Cervical back: Normal range of motion and neck supple  Feet:      Right foot:      Skin integrity: No ulcer, skin breakdown, erythema, warmth, callus or dry skin  Left foot:      Skin integrity: No ulcer, skin breakdown, erythema, warmth, callus or dry skin  Skin:     General: Skin is warm and dry  Coloration: Skin is not pale  Findings: No rash  Neurological:      Mental Status: He is alert and oriented to person, place, and time  He is not disoriented        Sensory: No sensory deficit  Gait: Gait normal    Psychiatric:         Speech: Speech normal          Behavior: Behavior normal        Patient's shoes and socks removed  Right Foot/Ankle   Right Foot Inspection  Skin Exam: skin normal and skin intact  No dry skin, no warmth, no callus, no erythema, no maceration, no abnormal color, no pre-ulcer, no ulcer and no callus  Toe Exam: ROM and strength within normal limits  Sensory   Vibration: intact  Proprioception: intact  Monofilament testing: intact    Vascular  Capillary refills: < 3 seconds  The right DP pulse is 2+  The right PT pulse is 2+  Left Foot/Ankle  Left Foot Inspection  Skin Exam: skin normal and skin intact  No dry skin, no warmth, no erythema, no maceration, normal color, no pre-ulcer, no ulcer and no callus  Toe Exam: ROM and strength within normal limits  Sensory   Vibration: intact  Proprioception: intact  Monofilament testing: intact    Vascular  Capillary refills: < 3 seconds  The left DP pulse is 2+  The left PT pulse is 2+       Assign Risk Category  No deformity present  No loss of protective sensation  No weak pulses  Risk: 0

## 2023-02-14 DIAGNOSIS — G25.81 RESTLESS LEG: ICD-10-CM

## 2023-02-14 RX ORDER — ROPINIROLE 2 MG/1
TABLET, FILM COATED ORAL
Qty: 180 TABLET | Refills: 0 | Status: SHIPPED | OUTPATIENT
Start: 2023-02-14

## 2023-03-15 DIAGNOSIS — E11.65 TYPE 2 DIABETES MELLITUS WITH HYPERGLYCEMIA, WITHOUT LONG-TERM CURRENT USE OF INSULIN (HCC): ICD-10-CM

## 2023-03-15 DIAGNOSIS — E03.9 ACQUIRED HYPOTHYROIDISM: ICD-10-CM

## 2023-03-15 DIAGNOSIS — Z79.4 TYPE 2 DIABETES MELLITUS WITHOUT COMPLICATION, WITH LONG-TERM CURRENT USE OF INSULIN (HCC): Chronic | ICD-10-CM

## 2023-03-15 DIAGNOSIS — E78.2 MIXED HYPERLIPIDEMIA: Chronic | ICD-10-CM

## 2023-03-15 DIAGNOSIS — E11.9 TYPE 2 DIABETES MELLITUS WITHOUT COMPLICATION, WITH LONG-TERM CURRENT USE OF INSULIN (HCC): Chronic | ICD-10-CM

## 2023-03-15 DIAGNOSIS — M10.9 GOUT, UNSPECIFIED CAUSE, UNSPECIFIED CHRONICITY, UNSPECIFIED SITE: ICD-10-CM

## 2023-03-15 DIAGNOSIS — G25.81 RESTLESS LEG: ICD-10-CM

## 2023-03-15 RX ORDER — GLIPIZIDE 5 MG/1
TABLET ORAL
Qty: 180 TABLET | Refills: 0 | Status: SHIPPED | OUTPATIENT
Start: 2023-03-15

## 2023-03-15 RX ORDER — DULAGLUTIDE 1.5 MG/.5ML
INJECTION, SOLUTION SUBCUTANEOUS
Qty: 6 ML | Refills: 0 | Status: SHIPPED | OUTPATIENT
Start: 2023-03-15

## 2023-03-15 RX ORDER — ROPINIROLE 2 MG/1
TABLET, FILM COATED ORAL
Qty: 180 TABLET | Refills: 0 | Status: SHIPPED | OUTPATIENT
Start: 2023-03-15

## 2023-03-15 RX ORDER — ATORVASTATIN CALCIUM 20 MG/1
TABLET, FILM COATED ORAL
Qty: 90 TABLET | Refills: 0 | Status: SHIPPED | OUTPATIENT
Start: 2023-03-15

## 2023-03-15 RX ORDER — LISINOPRIL 40 MG/1
TABLET ORAL
Qty: 90 TABLET | Refills: 0 | Status: SHIPPED | OUTPATIENT
Start: 2023-03-15

## 2023-03-15 RX ORDER — ALLOPURINOL 100 MG/1
TABLET ORAL
Qty: 180 TABLET | Refills: 0 | Status: SHIPPED | OUTPATIENT
Start: 2023-03-15

## 2023-04-24 DIAGNOSIS — J40 BRONCHITIS: ICD-10-CM

## 2023-04-24 RX ORDER — BUDESONIDE AND FORMOTEROL FUMARATE DIHYDRATE 160; 4.5 UG/1; UG/1
2 AEROSOL RESPIRATORY (INHALATION) 2 TIMES DAILY
Qty: 10.2 G | Refills: 5 | Status: SHIPPED | OUTPATIENT
Start: 2023-04-24

## 2023-06-01 DIAGNOSIS — E78.2 MIXED HYPERLIPIDEMIA: Chronic | ICD-10-CM

## 2023-06-01 DIAGNOSIS — Z79.4 TYPE 2 DIABETES MELLITUS WITHOUT COMPLICATION, WITH LONG-TERM CURRENT USE OF INSULIN (HCC): Chronic | ICD-10-CM

## 2023-06-01 DIAGNOSIS — E11.9 TYPE 2 DIABETES MELLITUS WITHOUT COMPLICATION, WITH LONG-TERM CURRENT USE OF INSULIN (HCC): Chronic | ICD-10-CM

## 2023-06-01 DIAGNOSIS — M10.9 GOUT, UNSPECIFIED CAUSE, UNSPECIFIED CHRONICITY, UNSPECIFIED SITE: ICD-10-CM

## 2023-06-01 DIAGNOSIS — E11.65 TYPE 2 DIABETES MELLITUS WITH HYPERGLYCEMIA, WITHOUT LONG-TERM CURRENT USE OF INSULIN (HCC): ICD-10-CM

## 2023-06-01 DIAGNOSIS — G25.81 RESTLESS LEG: ICD-10-CM

## 2023-06-01 DIAGNOSIS — E03.9 ACQUIRED HYPOTHYROIDISM: ICD-10-CM

## 2023-06-01 RX ORDER — ALLOPURINOL 100 MG/1
TABLET ORAL
Qty: 180 TABLET | Refills: 0 | Status: SHIPPED | OUTPATIENT
Start: 2023-06-01

## 2023-06-01 RX ORDER — LISINOPRIL 40 MG/1
TABLET ORAL
Qty: 90 TABLET | Refills: 0 | Status: SHIPPED | OUTPATIENT
Start: 2023-06-01

## 2023-06-01 RX ORDER — DULAGLUTIDE 1.5 MG/.5ML
INJECTION, SOLUTION SUBCUTANEOUS
Qty: 6 ML | Refills: 0 | Status: SHIPPED | OUTPATIENT
Start: 2023-06-01

## 2023-06-01 RX ORDER — ROPINIROLE 2 MG/1
TABLET, FILM COATED ORAL
Qty: 180 TABLET | Refills: 0 | Status: SHIPPED | OUTPATIENT
Start: 2023-06-01

## 2023-06-01 RX ORDER — ATORVASTATIN CALCIUM 20 MG/1
TABLET, FILM COATED ORAL
Qty: 90 TABLET | Refills: 0 | Status: SHIPPED | OUTPATIENT
Start: 2023-06-01

## 2023-06-01 RX ORDER — GLIPIZIDE 5 MG/1
TABLET ORAL
Qty: 180 TABLET | Refills: 0 | Status: SHIPPED | OUTPATIENT
Start: 2023-06-01

## 2023-06-14 ENCOUNTER — OFFICE VISIT (OUTPATIENT)
Dept: FAMILY MEDICINE CLINIC | Facility: CLINIC | Age: 58
End: 2023-06-14
Payer: COMMERCIAL

## 2023-06-14 VITALS
BODY MASS INDEX: 45.75 KG/M2 | DIASTOLIC BLOOD PRESSURE: 81 MMHG | HEART RATE: 114 BPM | WEIGHT: 315 LBS | OXYGEN SATURATION: 90 % | TEMPERATURE: 98.8 F | SYSTOLIC BLOOD PRESSURE: 125 MMHG

## 2023-06-14 DIAGNOSIS — N30.01 ACUTE CYSTITIS WITH HEMATURIA: Primary | ICD-10-CM

## 2023-06-14 DIAGNOSIS — R21 RASH AND NONSPECIFIC SKIN ERUPTION: ICD-10-CM

## 2023-06-14 LAB
SL AMB  POCT GLUCOSE, UA: ABNORMAL
SL AMB LEUKOCYTE ESTERASE,UA: ABNORMAL
SL AMB POCT BILIRUBIN,UA: ABNORMAL
SL AMB POCT BLOOD,UA: ABNORMAL
SL AMB POCT CLARITY,UA: ABNORMAL
SL AMB POCT COLOR,UA: YELLOW
SL AMB POCT KETONES,UA: ABNORMAL
SL AMB POCT NITRITE,UA: ABNORMAL
SL AMB POCT PH,UA: 5
SL AMB POCT SPECIFIC GRAVITY,UA: <=1.005
SL AMB POCT URINE PROTEIN: ABNORMAL
SL AMB POCT UROBILINOGEN: 0.2

## 2023-06-14 PROCEDURE — 81002 URINALYSIS NONAUTO W/O SCOPE: CPT | Performed by: NURSE PRACTITIONER

## 2023-06-14 PROCEDURE — 99214 OFFICE O/P EST MOD 30 MIN: CPT | Performed by: NURSE PRACTITIONER

## 2023-06-14 RX ORDER — NITROFURANTOIN 25; 75 MG/1; MG/1
100 CAPSULE ORAL 2 TIMES DAILY
Qty: 10 CAPSULE | Refills: 0 | Status: SHIPPED | OUTPATIENT
Start: 2023-06-14 | End: 2023-06-19

## 2023-06-14 RX ORDER — TRIAMCINOLONE ACETONIDE 1 MG/G
CREAM TOPICAL 2 TIMES DAILY
Qty: 30 G | Refills: 0 | Status: ON HOLD | OUTPATIENT
Start: 2023-06-14

## 2023-06-14 RX ORDER — PREDNISONE 20 MG/1
TABLET ORAL
Qty: 15 TABLET | Refills: 1 | Status: SHIPPED | OUTPATIENT
Start: 2023-06-14 | End: 2023-06-24

## 2023-06-14 NOTE — PATIENT INSTRUCTIONS
UA positive for UTI  Macrobid as ordered  Increase fluids    Prednisone as prescribed for rash  Apply triamcinolone cream to affected areas as ordered   Call with problems/concerns

## 2023-06-14 NOTE — PROGRESS NOTES
[facility]        NAME: Alta Mckeon is a 60 y o  male  : 1965    MRN: [id number]  DATE: 2023  TIME: 9:47 AM    Assessment and Plan   Acute cystitis with hematuria [N30 01]  1  Acute cystitis with hematuria  nitrofurantoin (MACROBID) 100 mg capsule      2  Rash and nonspecific skin eruption  predniSONE 20 mg tablet    triamcinolone (KENALOG) 0 1 % cream            Patient Instructions     Patient Instructions   UA positive for UTI  Macrobid as ordered  Increase fluids    Prednisone as prescribed for rash  Apply triamcinolone cream to affected areas as ordered   Call with problems/concerns          Chief Complaint     Chief Complaint   Patient presents with   • Urinary Frequency         History of Present Illness       C/o frequency, urgency, pressure in bladder, some burning x 4-5 days, No fever  C/o rash on b/l leg/arm itching    Urinary Frequency   Associated symptoms include frequency and urgency  Review of Systems   Review of Systems   Constitutional: Negative for activity change and fever  Respiratory: Negative for shortness of breath  Cardiovascular: Negative for chest pain  Gastrointestinal: Negative for abdominal pain  Genitourinary: Positive for decreased urine volume, dysuria, frequency and urgency  Skin: Positive for rash           Current Medications       Current Outpatient Medications:   •  albuterol (PROVENTIL HFA,VENTOLIN HFA) 90 mcg/act inhaler, Inhale 2 puffs every 4 (four) hours as needed for wheezing, Disp: 18 g, Rfl: 0  •  allopurinol (ZYLOPRIM) 100 mg tablet, take 2 tablets by mouth once daily, Disp: 180 tablet, Rfl: 0  •  atorvastatin (LIPITOR) 20 mg tablet, take 1 tablet by mouth once daily, Disp: 90 tablet, Rfl: 0  •  budesonide-formoterol (Symbicort) 160-4 5 mcg/act inhaler, Inhale 2 puffs 2 (two) times a day Rinse mouth after use, Disp: 10 2 g, Rfl: 5  •  cetirizine (ZyrTEC) 10 mg tablet, Take 10 mg by mouth daily, Disp: , Rfl:   • Cyanocobalamin (VITAMIN B 12 PO), Take by mouth, Disp: , Rfl:   •  Dulaglutide (Trulicity) 1 5 NE/0 7KI SOPN, Inject 0 5 mL (1 5 mg total) under the skin once a week, Disp: 6 mL, Rfl: 0  •  glipiZIDE (GLUCOTROL) 5 mg tablet, take 1 tablet by mouth twice a day BEFORE MEALS, Disp: 180 tablet, Rfl: 0  •  HYDROcodone-acetaminophen (NORCO)  mg per tablet, Take 1 tablet by mouth every 4 (four) hours as needed, Disp: , Rfl:   •  indomethacin (INDOCIN) 25 mg capsule, Take by mouth every 8 (eight) hours, Disp: , Rfl:   •  ipratropium-albuterol (DUO-NEB) 0 5-2 5 mg/3 mL nebulizer solution, Take 1 vial (3 mL total) by nebulization 4 (four) times a day, Disp: 120 vial, Rfl: 6  •  lisinopril (ZESTRIL) 40 mg tablet, take 1 tablet by mouth once daily, Disp: 90 tablet, Rfl: 0  •  Magnesium 500 MG TABS, Take by mouth, Disp: , Rfl:   •  Multiple Vitamin (MULTI-VITAMIN DAILY PO), Take 1 tablet by mouth daily, Disp: , Rfl:   •  nitrofurantoin (MACROBID) 100 mg capsule, Take 1 capsule (100 mg total) by mouth 2 (two) times a day for 5 days, Disp: 10 capsule, Rfl: 0  •  potassium chloride (K-DUR,KLOR-CON) 10 mEq tablet, Take 1 tablet by mouth 2 (two) times a day, Disp: 60 tablet, Rfl: 0  •  predniSONE 20 mg tablet, TAKE 1 TABLET BID X 5 DAYS THEN TAKE 1 TABLET QD FOR 5 DAYS, Disp: 15 tablet, Rfl: 1  •  rOPINIRole (REQUIP) 2 mg tablet, take 1 tablet by mouth twice a day, Disp: 180 tablet, Rfl: 0  •  triamcinolone (KENALOG) 0 1 % cream, Apply topically 2 (two) times a day, Disp: 30 g, Rfl: 0  •  Trulicity 1 5 IS/9 2LO injection, inject 0 5 milliliters ( 1 AND 1/2 milligrams ) subcutaneously ONCE EVERY WEEK, Disp: 6 mL, Rfl: 0    Current Allergies     Allergies as of 06/14/2023 - Reviewed 06/14/2023   Allergen Reaction Noted   • Other Cough and Nasal Congestion 08/16/2019            The following portions of the patient's history were reviewed and updated as appropriate: allergies, current medications, past family history, past medical history, past social history, past surgical history and problem list      Past Medical History:   Diagnosis Date   • Asthma    • Chronic pain    • Diabetes mellitus ([facility] )    • Gout    • Hyperlipidemia    • Hypertension    • Impaired fasting glucose     last assessed: 12/16/2015   • Knee arthropathy     last assessed: 3/23/2016       Past Surgical History:   Procedure Laterality Date   • KNEE ARTHROSCOPY Bilateral    • KNEE SURGERY         Family History   Problem Relation Age of Onset   • Coronary artery disease Father    • Cancer Other         malignant neoplasm   • Asthma Brother    • No Known Problems Daughter    • Asthma Mother    • COPD Mother    • Asthma Sister    • No Known Problems Brother    • Asthma Son    • No Known Problems Son          Medications have been verified  Objective   /81 (BP Location: Left arm, Patient Position: Sitting, Cuff Size: Standard)   Pulse (!) 114   Temp 98 8 °F (37 1 °C) (Tympanic)   Wt (!) 149 kg (328 lb)   SpO2 90%   BMI 45 75 kg/m²        Physical Exam     Physical Exam  Vitals and nursing note reviewed  Constitutional:       General: He is not in acute distress  Appearance: Normal appearance  He is not ill-appearing  HENT:      Head: Normocephalic  Cardiovascular:      Rate and Rhythm: Normal rate and regular rhythm  Heart sounds: Normal heart sounds  Pulmonary:      Effort: Pulmonary effort is normal       Breath sounds: Normal breath sounds  Abdominal:      Tenderness: There is no abdominal tenderness  Musculoskeletal:         General: Normal range of motion  Cervical back: Normal range of motion  Skin:     General: Skin is warm and dry  Findings: Erythema and rash present  Comments: Erythematous patchy rash b/l arms   Neurological:      Mental Status: He is alert and oriented to person, place, and time     Psychiatric:         Mood and Affect: Mood normal          Behavior: Behavior normal

## 2023-06-17 LAB
BACTERIA UR CULT: ABNORMAL
Lab: ABNORMAL
SL AMB ANTIMICROBIAL SUSCEPTIBILITY: ABNORMAL

## 2023-06-19 ENCOUNTER — TELEPHONE (OUTPATIENT)
Dept: OTHER | Facility: OTHER | Age: 58
End: 2023-06-19

## 2023-06-19 DIAGNOSIS — N30.01 ACUTE CYSTITIS WITH HEMATURIA: Primary | ICD-10-CM

## 2023-06-19 RX ORDER — SULFAMETHOXAZOLE AND TRIMETHOPRIM 800; 160 MG/1; MG/1
1 TABLET ORAL EVERY 12 HOURS SCHEDULED
Qty: 20 TABLET | Refills: 0 | Status: SHIPPED | OUTPATIENT
Start: 2023-06-19 | End: 2023-06-24

## 2023-06-19 NOTE — TELEPHONE ENCOUNTER
Pt called in stating he took his antibiotics for a week and he's out of them, but they didn’t really take care of the problem  He's requesting a call back at [phone]

## 2023-06-22 ENCOUNTER — NURSE TRIAGE (OUTPATIENT)
Dept: OTHER | Facility: OTHER | Age: 58
End: 2023-06-22

## 2023-06-22 DIAGNOSIS — N30.00 ACUTE CYSTITIS WITHOUT HEMATURIA: Primary | ICD-10-CM

## 2023-06-22 RX ORDER — CIPROFLOXACIN 500 MG/1
500 TABLET, FILM COATED ORAL EVERY 12 HOURS SCHEDULED
Qty: 20 TABLET | Refills: 0 | Status: SHIPPED | OUTPATIENT
Start: 2023-06-22 | End: 2023-06-24

## 2023-06-22 NOTE — TELEPHONE ENCOUNTER
"Patient on second round of antibiotics for UTI, currently taking bactrim  Patient states he is still feeling dehydrated with nocturia  Please follow up regarding medication  Reason for Disposition  • [1] Caller has URGENT medicine question about med that PCP or specialist prescribed AND [2] triager unable to answer question    Answer Assessment - Initial Assessment Questions  1  NAME of MEDICATION: \"What medicine are you calling about? \"      Bactrim    2  QUESTION: Humphrey Lemos is your question? \" (e g , medication refill, side effect)     \"Medication still not working  \"    3  PRESCRIBING HCP: \"Who prescribed it? \" Reason: if prescribed by specialist, call should be referred to that group  PCP    4  SYMPTOMS: \"Do you have any symptoms? \"      Dehydrated and nocturia    5  SEVERITY: If symptoms are present, ask \"Are they mild, moderate or severe? \"      Moderate    6  PREGNANCY:  \"Is there any chance that you are pregnant? \" \"When was your last menstrual period? \"      N/A    Protocols used: MEDICATION QUESTION CALL-ADULT-    "

## 2023-06-22 NOTE — TELEPHONE ENCOUNTER
"Regarding: UTI medication not working  ----- Message from [facility] [id number] sent at 6/22/2023  7:15 AM EDT -----  \" My doctor prescribed some medication for my UTI and it doesn't seem to be working I'm still dehydrated and I can't get any sleep  \"    "

## 2023-06-24 ENCOUNTER — APPOINTMENT (EMERGENCY)
Dept: RADIOLOGY | Facility: HOSPITAL | Age: 58
End: 2023-06-24
Payer: COMMERCIAL

## 2023-06-24 ENCOUNTER — HOSPITAL ENCOUNTER (INPATIENT)
Facility: HOSPITAL | Age: 58
LOS: 2 days | Discharge: HOME | End: 2023-06-26
Attending: EMERGENCY MEDICINE | Admitting: HOSPITALIST
Payer: COMMERCIAL

## 2023-06-24 DIAGNOSIS — R73.9 HYPERGLYCEMIA: Primary | ICD-10-CM

## 2023-06-24 DIAGNOSIS — E11.65 HYPERGLYCEMIA DUE TO TYPE 2 DIABETES MELLITUS (HCC): ICD-10-CM

## 2023-06-24 DIAGNOSIS — N39.0 UTI (URINARY TRACT INFECTION): ICD-10-CM

## 2023-06-24 PROBLEM — R39.9 SYMPTOMS INVOLVING URINARY SYSTEM: Status: ACTIVE | Noted: 2023-06-24

## 2023-06-24 PROBLEM — R35.0 URINARY FREQUENCY: Status: ACTIVE | Noted: 2023-06-24

## 2023-06-24 PROBLEM — A41.9 SEPSIS WITHOUT ACUTE ORGAN DYSFUNCTION (HCC): Status: ACTIVE | Noted: 2023-06-24

## 2023-06-24 LAB
ALBUMIN SERPL BCP-MCNC: 3.9 G/DL (ref 3.5–5)
ALP SERPL-CCNC: 96 U/L (ref 34–104)
ALT SERPL W P-5'-P-CCNC: 21 U/L (ref 7–52)
ANION GAP SERPL CALCULATED.3IONS-SCNC: 11 MMOL/L
ANION GAP SERPL CALCULATED.3IONS-SCNC: 9 MMOL/L
ANION GAP SERPL CALCULATED.3IONS-SCNC: 9 MMOL/L
APTT PPP: 24 SECONDS (ref 23–37)
AST SERPL W P-5'-P-CCNC: 13 U/L (ref 13–39)
BACTERIA UR QL AUTO: NORMAL /HPF
BASOPHILS # BLD AUTO: 0.08 THOUSANDS/ÂΜL (ref 0–0.1)
BASOPHILS NFR BLD AUTO: 1 % (ref 0–1)
BETA-HYDROXYBUTYRATE: 0.7 MMOL/L
BILIRUB SERPL-MCNC: 0.44 MG/DL (ref 0.2–1)
BILIRUB UR QL STRIP: NEGATIVE
BNP SERPL-MCNC: 13 PG/ML (ref 0–100)
BUN SERPL-MCNC: 17 MG/DL (ref 5–25)
BUN SERPL-MCNC: 18 MG/DL (ref 5–25)
BUN SERPL-MCNC: 21 MG/DL (ref 5–25)
CALCIUM SERPL-MCNC: 9.5 MG/DL (ref 8.4–10.2)
CALCIUM SERPL-MCNC: 9.6 MG/DL (ref 8.4–10.2)
CALCIUM SERPL-MCNC: 9.7 MG/DL (ref 8.4–10.2)
CHLORIDE SERPL-SCNC: 87 MMOL/L (ref 96–108)
CHLORIDE SERPL-SCNC: 92 MMOL/L (ref 96–108)
CHLORIDE SERPL-SCNC: 93 MMOL/L (ref 96–108)
CLARITY UR: CLEAR
CO2 SERPL-SCNC: 24 MMOL/L (ref 21–32)
CO2 SERPL-SCNC: 26 MMOL/L (ref 21–32)
CO2 SERPL-SCNC: 26 MMOL/L (ref 21–32)
COLOR UR: ABNORMAL
CREAT SERPL-MCNC: 0.99 MG/DL (ref 0.6–1.3)
CREAT SERPL-MCNC: 1.02 MG/DL (ref 0.6–1.3)
CREAT SERPL-MCNC: 1.09 MG/DL (ref 0.6–1.3)
EOSINOPHIL # BLD AUTO: 0.18 THOUSAND/ÂΜL (ref 0–0.61)
EOSINOPHIL NFR BLD AUTO: 2 % (ref 0–6)
ERYTHROCYTE [DISTWIDTH] IN BLOOD BY AUTOMATED COUNT: 12.7 % (ref 11.6–15.1)
EST. AVERAGE GLUCOSE BLD GHB EST-MCNC: 275 MG/DL
GFR SERPL CREATININE-BSD FRML MDRD: 73 ML/MIN/1.73SQ M
GFR SERPL CREATININE-BSD FRML MDRD: 80 ML/MIN/1.73SQ M
GFR SERPL CREATININE-BSD FRML MDRD: 83 ML/MIN/1.73SQ M
GLUCOSE SERPL-MCNC: 287 MG/DL (ref 65–140)
GLUCOSE SERPL-MCNC: 310 MG/DL (ref 65–140)
GLUCOSE SERPL-MCNC: 333 MG/DL (ref 65–140)
GLUCOSE SERPL-MCNC: 433 MG/DL (ref 65–140)
GLUCOSE SERPL-MCNC: 478 MG/DL (ref 65–140)
GLUCOSE SERPL-MCNC: 481 MG/DL (ref 65–140)
GLUCOSE SERPL-MCNC: 536 MG/DL (ref 65–140)
GLUCOSE SERPL-MCNC: 747 MG/DL (ref 65–140)
GLUCOSE SERPL-MCNC: >500 MG/DL (ref 65–140)
GLUCOSE UR STRIP-MCNC: ABNORMAL MG/DL
HBA1C MFR BLD: 11.2 %
HCT VFR BLD AUTO: 47.7 % (ref 36.5–49.3)
HGB BLD-MCNC: 15.9 G/DL (ref 12–17)
HGB UR QL STRIP.AUTO: NEGATIVE
IMM GRANULOCYTES # BLD AUTO: 0.19 THOUSAND/UL (ref 0–0.2)
IMM GRANULOCYTES NFR BLD AUTO: 2 % (ref 0–2)
INR PPP: 0.93 (ref 0.84–1.19)
KETONES UR STRIP-MCNC: ABNORMAL MG/DL
LACTATE SERPL-SCNC: 1.6 MMOL/L (ref 0.5–2)
LACTATE SERPL-SCNC: 2.7 MMOL/L (ref 0.5–2)
LEUKOCYTE ESTERASE UR QL STRIP: NEGATIVE
LYMPHOCYTES # BLD AUTO: 2.21 THOUSANDS/ÂΜL (ref 0.6–4.47)
LYMPHOCYTES NFR BLD AUTO: 19 % (ref 14–44)
MCH RBC QN AUTO: 28.8 PG (ref 26.8–34.3)
MCHC RBC AUTO-ENTMCNC: 33.3 G/DL (ref 31.4–37.4)
MCV RBC AUTO: 86 FL (ref 82–98)
MONOCYTES # BLD AUTO: 1.08 THOUSAND/ÂΜL (ref 0.17–1.22)
MONOCYTES NFR BLD AUTO: 9 % (ref 4–12)
NEUTROPHILS # BLD AUTO: 7.85 THOUSANDS/ÂΜL (ref 1.85–7.62)
NEUTS SEG NFR BLD AUTO: 67 % (ref 43–75)
NITRITE UR QL STRIP: NEGATIVE
NON-SQ EPI CELLS URNS QL MICRO: NORMAL /HPF
NRBC BLD AUTO-RTO: 0 /100 WBCS
PH UR STRIP.AUTO: 5 [PH]
PLATELET # BLD AUTO: 152 THOUSANDS/UL (ref 149–390)
PMV BLD AUTO: 10.2 FL (ref 8.9–12.7)
POTASSIUM SERPL-SCNC: 4 MMOL/L (ref 3.5–5.3)
POTASSIUM SERPL-SCNC: 4.5 MMOL/L (ref 3.5–5.3)
POTASSIUM SERPL-SCNC: 4.6 MMOL/L (ref 3.5–5.3)
PROCALCITONIN SERPL-MCNC: 0.05 NG/ML
PROT SERPL-MCNC: 7.4 G/DL (ref 6.4–8.4)
PROT UR STRIP-MCNC: ABNORMAL MG/DL
PROTHROMBIN TIME: 13.1 SECONDS (ref 11.6–14.5)
RBC # BLD AUTO: 5.52 MILLION/UL (ref 3.88–5.62)
RBC #/AREA URNS AUTO: NORMAL /HPF
SODIUM SERPL-SCNC: 122 MMOL/L (ref 135–147)
SODIUM SERPL-SCNC: 127 MMOL/L (ref 135–147)
SODIUM SERPL-SCNC: 128 MMOL/L (ref 135–147)
SP GR UR STRIP.AUTO: <1.005 (ref 1–1.03)
UROBILINOGEN UR STRIP-ACNC: <2 MG/DL
WBC # BLD AUTO: 11.59 THOUSAND/UL (ref 4.31–10.16)
WBC #/AREA URNS AUTO: NORMAL /HPF

## 2023-06-24 PROCEDURE — 83880 ASSAY OF NATRIURETIC PEPTIDE: CPT | Performed by: EMERGENCY MEDICINE

## 2023-06-24 PROCEDURE — 85610 PROTHROMBIN TIME: CPT | Performed by: EMERGENCY MEDICINE

## 2023-06-24 PROCEDURE — 99284 EMERGENCY DEPT VISIT MOD MDM: CPT

## 2023-06-24 PROCEDURE — 99223 1ST HOSP IP/OBS HIGH 75: CPT | Performed by: STUDENT IN AN ORGANIZED HEALTH CARE EDUCATION/TRAINING PROGRAM

## 2023-06-24 PROCEDURE — 82948 REAGENT STRIP/BLOOD GLUCOSE: CPT

## 2023-06-24 PROCEDURE — 83605 ASSAY OF LACTIC ACID: CPT | Performed by: EMERGENCY MEDICINE

## 2023-06-24 PROCEDURE — 85730 THROMBOPLASTIN TIME PARTIAL: CPT | Performed by: EMERGENCY MEDICINE

## 2023-06-24 PROCEDURE — 3046F HEMOGLOBIN A1C LEVEL >9.0%: CPT | Performed by: NURSE PRACTITIONER

## 2023-06-24 PROCEDURE — 85025 COMPLETE CBC W/AUTO DIFF WBC: CPT | Performed by: EMERGENCY MEDICINE

## 2023-06-24 PROCEDURE — 83036 HEMOGLOBIN GLYCOSYLATED A1C: CPT | Performed by: STUDENT IN AN ORGANIZED HEALTH CARE EDUCATION/TRAINING PROGRAM

## 2023-06-24 PROCEDURE — 87040 BLOOD CULTURE FOR BACTERIA: CPT | Performed by: EMERGENCY MEDICINE

## 2023-06-24 PROCEDURE — 84145 PROCALCITONIN (PCT): CPT | Performed by: EMERGENCY MEDICINE

## 2023-06-24 PROCEDURE — 81001 URINALYSIS AUTO W/SCOPE: CPT | Performed by: EMERGENCY MEDICINE

## 2023-06-24 PROCEDURE — 99285 EMERGENCY DEPT VISIT HI MDM: CPT | Performed by: EMERGENCY MEDICINE

## 2023-06-24 PROCEDURE — 80048 BASIC METABOLIC PNL TOTAL CA: CPT | Performed by: STUDENT IN AN ORGANIZED HEALTH CARE EDUCATION/TRAINING PROGRAM

## 2023-06-24 PROCEDURE — 36415 COLL VENOUS BLD VENIPUNCTURE: CPT | Performed by: EMERGENCY MEDICINE

## 2023-06-24 PROCEDURE — 83605 ASSAY OF LACTIC ACID: CPT | Performed by: STUDENT IN AN ORGANIZED HEALTH CARE EDUCATION/TRAINING PROGRAM

## 2023-06-24 PROCEDURE — 80053 COMPREHEN METABOLIC PANEL: CPT | Performed by: EMERGENCY MEDICINE

## 2023-06-24 PROCEDURE — 96360 HYDRATION IV INFUSION INIT: CPT

## 2023-06-24 PROCEDURE — 71045 X-RAY EXAM CHEST 1 VIEW: CPT

## 2023-06-24 PROCEDURE — 80048 BASIC METABOLIC PNL TOTAL CA: CPT | Performed by: INTERNAL MEDICINE

## 2023-06-24 PROCEDURE — 93005 ELECTROCARDIOGRAM TRACING: CPT

## 2023-06-24 PROCEDURE — 82010 KETONE BODYS QUAN: CPT | Performed by: EMERGENCY MEDICINE

## 2023-06-24 RX ORDER — ATORVASTATIN CALCIUM 20 MG/1
20 TABLET, FILM COATED ORAL DAILY
Status: DISCONTINUED | OUTPATIENT
Start: 2023-06-25 | End: 2023-06-26 | Stop reason: HOSPADM

## 2023-06-24 RX ORDER — SODIUM CHLORIDE 9 MG/ML
100 INJECTION, SOLUTION INTRAVENOUS CONTINUOUS
Status: DISCONTINUED | OUTPATIENT
Start: 2023-06-24 | End: 2023-06-24

## 2023-06-24 RX ORDER — ALBUTEROL SULFATE 90 UG/1
2 AEROSOL, METERED RESPIRATORY (INHALATION) EVERY 4 HOURS PRN
Status: DISCONTINUED | OUTPATIENT
Start: 2023-06-24 | End: 2023-06-26 | Stop reason: HOSPADM

## 2023-06-24 RX ORDER — SODIUM CHLORIDE 9 MG/ML
100 INJECTION, SOLUTION INTRAVENOUS CONTINUOUS
Status: DISPENSED | OUTPATIENT
Start: 2023-06-24 | End: 2023-06-25

## 2023-06-24 RX ORDER — UREA 10 %
500 LOTION (ML) TOPICAL DAILY
Status: DISCONTINUED | OUTPATIENT
Start: 2023-06-25 | End: 2023-06-26 | Stop reason: HOSPADM

## 2023-06-24 RX ORDER — LISINOPRIL 20 MG/1
40 TABLET ORAL DAILY
Status: DISCONTINUED | OUTPATIENT
Start: 2023-06-25 | End: 2023-06-26 | Stop reason: HOSPADM

## 2023-06-24 RX ORDER — CEFTRIAXONE 2 G/50ML
2000 INJECTION, SOLUTION INTRAVENOUS ONCE
Status: COMPLETED | OUTPATIENT
Start: 2023-06-24 | End: 2023-06-24

## 2023-06-24 RX ORDER — POTASSIUM CHLORIDE 750 MG/1
10 TABLET, EXTENDED RELEASE ORAL 2 TIMES DAILY
Status: DISCONTINUED | OUTPATIENT
Start: 2023-06-24 | End: 2023-06-26 | Stop reason: HOSPADM

## 2023-06-24 RX ORDER — BUDESONIDE AND FORMOTEROL FUMARATE DIHYDRATE 160; 4.5 UG/1; UG/1
2 AEROSOL RESPIRATORY (INHALATION) 2 TIMES DAILY
Status: DISCONTINUED | OUTPATIENT
Start: 2023-06-24 | End: 2023-06-26 | Stop reason: HOSPADM

## 2023-06-24 RX ORDER — ROPINIROLE 2 MG/1
2 TABLET, FILM COATED ORAL 2 TIMES DAILY
Status: DISCONTINUED | OUTPATIENT
Start: 2023-06-24 | End: 2023-06-26 | Stop reason: HOSPADM

## 2023-06-24 RX ORDER — ALLOPURINOL 100 MG/1
200 TABLET ORAL DAILY
Status: DISCONTINUED | OUTPATIENT
Start: 2023-06-25 | End: 2023-06-26 | Stop reason: HOSPADM

## 2023-06-24 RX ORDER — ENOXAPARIN SODIUM 100 MG/ML
40 INJECTION SUBCUTANEOUS EVERY 12 HOURS
Status: DISCONTINUED | OUTPATIENT
Start: 2023-06-24 | End: 2023-06-26 | Stop reason: HOSPADM

## 2023-06-24 RX ORDER — SODIUM CHLORIDE 9 MG/ML
3 INJECTION INTRAVENOUS EVERY 8 HOURS SCHEDULED
Status: DISCONTINUED | OUTPATIENT
Start: 2023-06-24 | End: 2023-06-26 | Stop reason: HOSPADM

## 2023-06-24 RX ORDER — ACETAMINOPHEN 325 MG/1
650 TABLET ORAL EVERY 6 HOURS PRN
Status: DISCONTINUED | OUTPATIENT
Start: 2023-06-24 | End: 2023-06-26 | Stop reason: HOSPADM

## 2023-06-24 RX ORDER — CEFTRIAXONE 1 G/50ML
1000 INJECTION, SOLUTION INTRAVENOUS ONCE
Status: DISCONTINUED | OUTPATIENT
Start: 2023-06-24 | End: 2023-06-24

## 2023-06-24 RX ADMIN — ENOXAPARIN SODIUM 40 MG: 100 INJECTION SUBCUTANEOUS at 21:10

## 2023-06-24 RX ADMIN — SODIUM CHLORIDE 8 UNITS/HR: 9 INJECTION, SOLUTION INTRAVENOUS at 21:08

## 2023-06-24 RX ADMIN — CEFTRIAXONE 2000 MG: 2 INJECTION, SOLUTION INTRAVENOUS at 16:03

## 2023-06-24 RX ADMIN — ROPINIROLE 2 MG: 2 TABLET, FILM COATED ORAL at 18:29

## 2023-06-24 RX ADMIN — SODIUM CHLORIDE 9 UNITS/HR: 9 INJECTION, SOLUTION INTRAVENOUS at 22:05

## 2023-06-24 RX ADMIN — SODIUM CHLORIDE 10 UNITS/HR: 9 INJECTION, SOLUTION INTRAVENOUS at 23:01

## 2023-06-24 RX ADMIN — SODIUM CHLORIDE 10 UNITS/HR: 9 INJECTION, SOLUTION INTRAVENOUS at 18:21

## 2023-06-24 RX ADMIN — SODIUM CHLORIDE 100 ML/HR: 0.9 INJECTION, SOLUTION INTRAVENOUS at 18:33

## 2023-06-24 RX ADMIN — POTASSIUM CHLORIDE 10 MEQ: 750 TABLET, EXTENDED RELEASE ORAL at 18:28

## 2023-06-24 RX ADMIN — BUDESONIDE AND FORMOTEROL FUMARATE DIHYDRATE 2 PUFF: 160; 4.5 AEROSOL RESPIRATORY (INHALATION) at 18:30

## 2023-06-24 RX ADMIN — SODIUM CHLORIDE, PRESERVATIVE FREE 3 ML: 5 INJECTION INTRAVENOUS at 22:20

## 2023-06-24 RX ADMIN — SODIUM CHLORIDE 1000 ML: 0.9 INJECTION, SOLUTION INTRAVENOUS at 13:54

## 2023-06-24 RX ADMIN — INSULIN HUMAN 10 UNITS: 100 INJECTION, SOLUTION PARENTERAL at 15:21

## 2023-06-24 NOTE — ED PROVIDER NOTES
History  Chief Complaint   Patient presents with   • Urinary Frequency     Pt to ED c/o urinary frequency x 1 week  Pt states he is going about every hour  Pt does not feel like he's emptying bladder  Pt states dr Natalia Lynch tried 3 antibiotics with no Improvement  History from patient and medical records  Patient states he has had urinary urgency and frequency urinating small amounts about every 1-2 hours  Symptoms have been for about 2 weeks now  He was evaluated by primary care urine culture shows Klebsiella pneumonia  He tried nitrofurantoin, Bactrim, and Cipro without any relief  Last couple days he has had some watery diarrhea  He feels generalized weakness and fatigue  Denies any specific fevers or chills  He feels like there is some swelling at his lower legs  Denies any pain  He is diabetic and on June 14 he started prednisone for a rash that has resolved  He has not checked his blood sugars in a week now because he has been out of sorts  He has been trying to stay hydrated  Prior to Admission Medications   Prescriptions Last Dose Informant Patient Reported? Taking?    Cyanocobalamin (VITAMIN B 12 PO)   Yes No   Sig: Take by mouth   Dulaglutide (Trulicity) 1 5 VW/9 8MP SOPN   No No   Sig: Inject 0 5 mL (1 5 mg total) under the skin once a week   HYDROcodone-acetaminophen (NORCO)  mg per tablet   Yes No   Sig: Take 1 tablet by mouth every 4 (four) hours as needed   Magnesium 500 MG TABS   Yes No   Sig: Take by mouth   Multiple Vitamin (MULTI-VITAMIN DAILY PO)  Self Yes No   Sig: Take 1 tablet by mouth daily   Trulicity 1 5 CX/8 6IR injection   No No   Sig: inject 0 5 milliliters ( 1 AND 1/2 milligrams ) subcutaneously ONCE EVERY WEEK   albuterol (PROVENTIL HFA,VENTOLIN HFA) 90 mcg/act inhaler   No No   Sig: Inhale 2 puffs every 4 (four) hours as needed for wheezing   allopurinol (ZYLOPRIM) 100 mg tablet   No No   Sig: take 2 tablets by mouth once daily   atorvastatin (LIPITOR) 20 mg tablet   No No   Sig: take 1 tablet by mouth once daily   budesonide-formoterol (Symbicort) 160-4 5 mcg/act inhaler   No No   Sig: Inhale 2 puffs 2 (two) times a day Rinse mouth after use   ciprofloxacin (CIPRO) 500 mg tablet   No No   Sig: Take 1 tablet (500 mg total) by mouth every 12 (twelve) hours for 10 days   glipiZIDE (GLUCOTROL) 5 mg tablet   No No   Sig: take 1 tablet by mouth twice a day BEFORE MEALS   indomethacin (INDOCIN) 25 mg capsule  Self Yes No   Sig: Take by mouth every 8 (eight) hours   lisinopril (ZESTRIL) 40 mg tablet   No No   Sig: take 1 tablet by mouth once daily   potassium chloride (K-DUR,KLOR-CON) 10 mEq tablet  Self No No   Sig: Take 1 tablet by mouth 2 (two) times a day   rOPINIRole (REQUIP) 2 mg tablet   No No   Sig: take 1 tablet by mouth twice a day   sulfamethoxazole-trimethoprim (BACTRIM DS) 800-160 mg per tablet   No No   Sig: Take 1 tablet by mouth every 12 (twelve) hours for 10 days   sulfamethoxazole-trimethoprim (BACTRIM DS) 800-160 mg per tablet   No No   Sig: Take 1 tablet by mouth every 12 (twelve) hours for 10 days   triamcinolone (KENALOG) 0 1 % cream   No No   Sig: Apply topically 2 (two) times a day      Facility-Administered Medications: None       Past Medical History:   Diagnosis Date   • Asthma    • Chronic pain    • Diabetes mellitus (HCC)    • Gout    • Hyperlipidemia    • Hypertension    • Impaired fasting glucose     last assessed: 12/16/2015   • Knee arthropathy     last assessed: 3/23/2016       Past Surgical History:   Procedure Laterality Date   • KNEE ARTHROSCOPY Bilateral    • KNEE SURGERY         Family History   Problem Relation Age of Onset   • Coronary artery disease Father    • Cancer Other         malignant neoplasm   • Asthma Brother    • No Known Problems Daughter    • Asthma Mother    • COPD Mother    • Asthma Sister    • No Known Problems Brother    • Asthma Son    • No Known Problems Son      I have reviewed and agree with the history as documented  E-Cigarette/Vaping     E-Cigarette/Vaping Substances     Social History     Tobacco Use   • Smoking status: Never   • Smokeless tobacco: Never   Substance Use Topics   • Alcohol use: Yes     Comment: 2 light beers daily   • Drug use: Never       Review of Systems   Constitutional: Positive for fatigue  Negative for chills and fever  HENT: Negative for rhinorrhea and sore throat  Respiratory: Positive for shortness of breath  Negative for cough  Cardiovascular: Negative for chest pain  Gastrointestinal: Negative for abdominal pain, constipation, diarrhea, nausea and vomiting  Genitourinary: Positive for difficulty urinating and frequency  Negative for dysuria  Skin: Negative for rash  Neurological: Positive for weakness  All other systems reviewed and are negative  Physical Exam  Physical Exam  Vitals and nursing note reviewed  Constitutional:       Appearance: He is well-developed  He is obese  HENT:      Head: Normocephalic and atraumatic  Right Ear: External ear normal       Left Ear: External ear normal       Nose: Nose normal    Eyes:      Conjunctiva/sclera: Conjunctivae normal       Pupils: Pupils are equal, round, and reactive to light  Cardiovascular:      Rate and Rhythm: Regular rhythm  Tachycardia present  Heart sounds: Normal heart sounds  Pulmonary:      Effort: Pulmonary effort is normal  No respiratory distress  Breath sounds: Examination of the right-lower field reveals rales  Examination of the left-lower field reveals rales  Rales present  No wheezing  Abdominal:      General: Bowel sounds are normal  There is no distension  Palpations: Abdomen is soft  Tenderness: There is no abdominal tenderness  Genitourinary:     Penis: No tenderness or swelling  Comments: Buried penis  Musculoskeletal:         General: No deformity  Normal range of motion  Cervical back: Normal range of motion and neck supple   No spinous process tenderness  Comments: Trace bilateral lower extremity edema   Skin:     General: Skin is warm and dry  Findings: No rash  Neurological:      General: No focal deficit present  Mental Status: He is alert  GCS: GCS eye subscore is 4  GCS verbal subscore is 5  GCS motor subscore is 6  Sensory: No sensory deficit  Psychiatric:         Mood and Affect: Mood normal          Vital Signs  ED Triage Vitals   Temperature Pulse Respirations Blood Pressure SpO2   06/24/23 1316 06/24/23 1316 06/24/23 1316 06/24/23 1316 06/24/23 1316   98 6 °F (37 °C) (!) 110 18 155/78 98 %      Temp src Heart Rate Source Patient Position - Orthostatic VS BP Location FiO2 (%)   -- 06/24/23 1334 06/24/23 1316 06/24/23 1316 --    Monitor Sitting Left arm       Pain Score       06/24/23 1316       No Pain           Vitals:    06/24/23 1316 06/24/23 1334   BP: 155/78 148/77   Pulse: (!) 110 (!) 121   Patient Position - Orthostatic VS: Sitting Lying         Visual Acuity      ED Medications  Medications   sodium chloride (PF) 0 9 % injection 3 mL (has no administration in time range)   cefTRIAXone (ROCEPHIN) IVPB (premix in dextrose) 1,000 mg 50 mL (has no administration in time range)   insulin regular (HumuLIN R,NovoLIN R) injection 10 Units (has no administration in time range)   sodium chloride 0 9 % bolus 1,000 mL (1,000 mL Intravenous New Bag 6/24/23 1354)       Diagnostic Studies  Results Reviewed     Procedure Component Value Units Date/Time    Hemoglobin A1C w/ EAG Estimation [[id number]]     Lab Status: No result Specimen: Blood     B-Type Natriuretic Peptide(BNP) [[id number]] Collected: 06/24/23 1351    Lab Status:  In process Specimen: Blood from Arm, Right Updated: 06/24/23 1510    Beta Hydroxybutyrate [[id number]]  (Abnormal) Collected: 06/24/23 1439    Lab Status: Final result Specimen: Blood from Arm, Right Updated: 06/24/23 1458     BETA-HYDROXYBUTYRATE 0 7 mmol/L     Procalcitonin [[id number]] (Normal) Collected: 06/24/23 1351    Lab Status: Final result Specimen: Blood from Arm, Right Updated: 06/24/23 1453     Procalcitonin 0 05 ng/ml     Comprehensive metabolic panel [[id number]]  (Abnormal) Collected: 06/24/23 1351    Lab Status: Final result Specimen: Blood from Arm, Right Updated: 06/24/23 1435     Sodium 122 mmol/L      Potassium 4 5 mmol/L      Chloride 87 mmol/L      CO2 24 mmol/L      ANION GAP 11 mmol/L      BUN 21 mg/dL      Creatinine 1 02 mg/dL      Glucose 747 mg/dL      Calcium 9 6 mg/dL      AST 13 U/L      ALT 21 U/L      Alkaline Phosphatase 96 U/L      Total Protein 7 4 g/dL      Albumin 3 9 g/dL      Total Bilirubin 0 44 mg/dL      eGFR 80 ml/min/1 73sq m     Narrative:      Suze guidelines for Chronic Kidney Disease (CKD):   •  Stage 1 with normal or high GFR (GFR > 90 mL/min/1 73 square meters)  •  Stage 2 Mild CKD (GFR = 60-89 mL/min/1 73 square meters)  •  Stage 3A Moderate CKD (GFR = 45-59 mL/min/1 73 square meters)  •  Stage 3B Moderate CKD (GFR = 30-44 mL/min/1 73 square meters)  •  Stage 4 Severe CKD (GFR = 15-29 mL/min/1 73 square meters)  •  Stage 5 End Stage CKD (GFR <15 mL/min/1 73 square meters)  Note: GFR calculation is accurate only with a steady state creatinine    Lactic acid [[id number]]  (Abnormal) Collected: 06/24/23 1351    Lab Status: Final result Specimen: Blood from Arm, Right Updated: 06/24/23 1428     LACTIC ACID 2 7 mmol/L     Narrative:      Result may be elevated if tourniquet was used during collection      Lactic acid 2 Hours [[id number]]     Lab Status: No result Specimen: Blood     Protime-INR [[id number]]  (Normal) Collected: 06/24/23 1351    Lab Status: Final result Specimen: Blood from Arm, Right Updated: 06/24/23 1422     Protime 13 1 seconds      INR 0 93    APTT [[id number]]  (Normal) Collected: 06/24/23 1351    Lab Status: Final result Specimen: Blood from Arm, Right Updated: 06/24/23 1422     PTT 24 seconds Fingerstick Glucose (POCT) [[id number]]  (Abnormal) Collected: 06/24/23 1408    Lab Status: Final result Updated: 06/24/23 1410     POC Glucose >500 mg/dl     CBC and differential [[id number]]  (Abnormal) Collected: 06/24/23 1351    Lab Status: Final result Specimen: Blood from Arm, Right Updated: 06/24/23 1408     WBC 11 59 Thousand/uL      RBC 5 52 Million/uL      Hemoglobin 15 9 g/dL      Hematocrit 47 7 %      MCV 86 fL      MCH 28 8 pg      MCHC 33 3 g/dL      RDW 12 7 %      MPV 10 2 fL      Platelets 300 Thousands/uL      nRBC 0 /100 WBCs      Neutrophils Relative 67 %      Immat GRANS % 2 %      Lymphocytes Relative 19 %      Monocytes Relative 9 %      Eosinophils Relative 2 %      Basophils Relative 1 %      Neutrophils Absolute 7 85 Thousands/µL      Immature Grans Absolute 0 19 Thousand/uL      Lymphocytes Absolute 2 21 Thousands/µL      Monocytes Absolute 1 08 Thousand/µL      Eosinophils Absolute 0 18 Thousand/µL      Basophils Absolute 0 08 Thousands/µL     Blood culture #2 [[id number]] Collected: 06/24/23 1351    Lab Status: In process Specimen: Blood from Hand, Right Updated: 06/24/23 1405    Blood culture #1 [[id number]] Collected: 06/24/23 1351    Lab Status:  In process Specimen: Blood from Arm, Right Updated: 06/24/23 1405    Urine Microscopic [[id number]]  (Normal) Collected: 06/24/23 1335    Lab Status: Final result Specimen: Urine, Clean Catch Updated: 06/24/23 1358     RBC, UA 0-1 /hpf      WBC, UA 0-1 /hpf      Epithelial Cells None Seen /hpf      Bacteria, UA None Seen /hpf     UA w Reflex to Microscopic w Reflex to Culture [[id number]]  (Abnormal) Collected: 06/24/23 1335    Lab Status: Final result Specimen: Urine, Clean Catch Updated: 06/24/23 1340     Color, UA Light Yellow     Clarity, UA Clear     Specific Gravity, UA <1 005     pH, UA 5 0     Leukocytes, UA Negative     Nitrite, UA Negative     Protein, UA Trace mg/dl      Glucose, UA 1000 (1%) mg/dl      Ketones, UA 10 (1+) mg/dl Urobilinogen, UA <2 0 mg/dl      Bilirubin, UA Negative     Occult Blood, UA Negative                 XR chest portable   ED Interpretation by Catrina Pandya DO (06/24 1501)   No acute abnl                 Procedures  ECG 12 Lead Documentation Only    Date/Time: 6/24/2023 2:17 PM    Performed by: Catrina Pandya DO  Authorized by: Catrina Pandya DO    Indications / Diagnosis:  Generalized weakness  ECG reviewed by me, the ED Provider: yes    Patient location:  ED  Previous ECG:     Previous ECG:  Compared to current    Comparison ECG info:  2019    Similarity:  Changes noted  Interpretation:     Interpretation: non-specific    Rate:     ECG rate:  113    ECG rate assessment: tachycardic    Rhythm:     Rhythm: sinus tachycardia    Ectopy:     Ectopy: none    QRS:     QRS axis:  Normal    QRS intervals:  Normal  Conduction:     Conduction: normal    ST segments:     ST segments:  Normal  T waves:     T waves: peaked      Peaked:  V3, V4 and V5  Comments: This EKG was interpreted by me  ED Course                            Initial Sepsis Screening     Row Name 06/24/23 6867                Is the patient's history suggestive of a new or worsening infection? Yes (Proceed)  -RIO        Suspected source of infection urinary tract infection  -RIO        Indicate SIRS criteria Tachycardia > 90 bpm  -RIO        Are two or more of the above signs & symptoms of infection both present and new to the patient? No  -RIO              User Key  (r) = Recorded By, (t) = Taken By, (c) = Cosigned By    [address] Name Provider Type    [address], DO Physician                SBIRT 20yo+    Flowsheet Row Most Recent Value   Initial Alcohol Screen: US AUDIT-C     1  How often do you have a drink containing alcohol? 0 Filed at: 06/24/2023 1318   2  How many drinks containing alcohol do you have on a typical day you are drinking? 0 Filed at: 06/24/2023 1318   3a  Male UNDER 65:  How often do you have five or more drinks on one occasion? 0 Filed at: 06/24/2023 1318   3b  FEMALE Any Age, or MALE 65+: How often do you have 4 or more drinks on one occassion? 0 Filed at: 06/24/2023 1318   Audit-C Score 0 Filed at: 06/24/2023 1318   REENA: How many times in the past year have you    Used an illegal drug or used a prescription medication for non-medical reasons? Never Filed at: 06/24/2023 1318                    Medical Decision Making  Differential includes UTI resistant to oral antibiotics, hyperglycemia symptomatic, DKA, less likely CHF  Also consider dehydration  Bladder scan here was 177 mils but patient does report feeling like he does not empty his bladder perhaps he is developing BPH  Hyperglycemia: acute illness or injury  UTI (urinary tract infection): acute illness or injury  Amount and/or Complexity of Data Reviewed  Labs: ordered  Radiology: ordered and independent interpretation performed  Risk  Prescription drug management  Decision regarding hospitalization  Disposition  Final diagnoses:   Hyperglycemia - acute symptomatic, uncontrolled DM   UTI (urinary tract infection)     Time reflects when diagnosis was documented in both MDM as applicable and the Disposition within this note     Time User Action Codes Description Comment    6/24/2023  2:58 PM Thang Roberts Add [R73 9] Hyperglycemia     6/24/2023  2:58 PM Thang Roberts Add [N39 0] UTI (urinary tract infection)     6/24/2023  2:58 PM Thang Roberts Modify [R73 9] Hyperglycemia acute symptomatic, uncontrolled DM      ED Disposition     ED Disposition   Admit    Condition   Stable    Date/Time   Sat Jun 24, 2023  3:03 PM    Comment   Case was discussed with Sunitha Martinez* and the patient's admission status was agreed to be Admission Status: inpatient status to the service of Dr Lucie Allen   Follow-up Information    None         Patient's Medications   Discharge Prescriptions    No medications on file       No discharge procedures on file      PDMP Review     None ED Provider  Electronically Signed by           Cintia Latham DO  06/24/23 9725

## 2023-06-24 NOTE — ASSESSMENT & PLAN NOTE
Patient reports urinary frequency and incomplete emptying for about 2 weeks now  · Seen by PCP outpatient for suspected UTI and started on Macrobid and then switched to Bactrim and then Cipro due to no improvement in symptoms  · Urine culture on 6/14/2023 showed 50-100K colonies of MDRO Klebsiella pneumonia  · UA today is abnormal with trace protein, glucose and ketones but urine microscopy is negative for bacteria and also negative leukocytes and nitrites  · Low suspicion for present UTI as urine microscopy is negative and antibiotics tried outpatient were sensitive to bacteria  Instead, I suspect urinary frequency is likely secondary to hyperglycemia  · Patient received 1 dose IV ceftriaxone in ED  We will determine need for continued antibiotics pending reevaluation tomorrow  · PVR initially 233 and then 177 within an hour  urinary retention protocol ordered  · If urinary symptoms persist despite blood sugar control, can consider alternative etiology especially obstructive pathology such as BP  · Patient reports mild diarrhea yesterday and this morning but seems to be resolving at this time  If this persists later today and tomorrow, consider ordering C  difficile as patient has been on multiple antibiotics outpatient

## 2023-06-24 NOTE — ED NOTES
Bladder scan in triage 233, pt states he went to bathroom 2 mins prior to triage     Veto Hashimoto, RN  06/24/23 6786

## 2023-06-24 NOTE — ED NOTES
Patient urinated after triage  Post residual void to be completed        Mag Castellanos RN  06/24/23 5641

## 2023-06-24 NOTE — ASSESSMENT & PLAN NOTE
BP elevated 155/78 on admission, likely secondary to dehydration, asymptomatic  · Continue home lisinopril 40 mg daily  · Continue to monitor

## 2023-06-24 NOTE — ASSESSMENT & PLAN NOTE
Presented due to increased thirst, urinary frequency worsening in past few days over (initially began about 3 weeks ago)  Was initially started on PO abx as outpatient due to concern for UTI    · BG on admission 747 --> no evidence DKA/HHS   · Prior regimen: glipizide + trulicity   · Prior UFM0G 7 (4 months ago) --> repeat HbA1C is 11 2   · Initiated on non-DKA insulin gtt + IVF on admission, BG currently in 300s   · Endocrinology consult, appreciate recs  · Initiate Lantus 40 units BID --> will initiate tonight, DC gtt 1 hour after SC administration  · Initiate Humalog 26 units TID AC tomorrow 6/26 + ISS  · Carb controlled diet   · Will need new diabetic supplies on discharge given new insulin

## 2023-06-24 NOTE — H&P
[place]  H&P  Name: Timothy Kearney 60 y o  male I MRN: [id number]  Unit/Bed#: ED 15 I Date of Admission: 6/24/2023   Date of Service: 6/24/2023 I Hospital Day: 0      Assessment/Plan   * Hyperglycemia due to type 2 diabetes mellitus [facility])  Assessment & Plan  Lab Results   Component Value Date    HGBA1C 7 0 (H) 02/03/2023   Patient presenting with blood sugar 747, normal anion gap 11, borderline elevated BHB 0 7, mild lactic acidosis 2 7 but otherwise normal CO2 with symptoms of hyperglycemia of urinary frequency, no altered mentation  · Does not meet criteria for DKA or HHS  · Hyperglycemia likely secondary to recent outpatient prednisone course and suspected UTI  · Started with IV regular insulin 10 units x 1, improved to high 500s  · Start non-DKA IV insulin drip, check blood sugar every 2 hours  · Gentle IV hydration- received 1L NS bolus in ED, start NS 100cc/hr x10 hours  · Repeat hgb A1c ordered - if elevated, will need endo consult as home regimen will need adjustment  · Home regimen of glipizide and Trulicity held    Urinary frequency  Assessment & Plan  Patient reports urinary frequency and incomplete emptying for about 2 weeks now  · Seen by PCP outpatient for suspected UTI and started on Macrobid and then switched to Bactrim and then Cipro due to no improvement in symptoms  · Urine culture on 6/14/2023 showed 50-100K colonies of MDRO Klebsiella pneumonia  · UA today is abnormal with trace protein, glucose and ketones but urine microscopy is negative for bacteria and also negative leukocytes and nitrites  · Low suspicion for present UTI as urine microscopy is negative and antibiotics tried outpatient were sensitive to bacteria  Instead, I suspect urinary frequency is likely secondary to hyperglycemia  · Does not meet sirs or sepsis criteria  · Patient received 1 dose IV ceftriaxone in ED    We will determine need for continued antibiotics pending reevaluation tomorrow  · PVR initially 233 and then 177 within an hour  urinary retention protocol ordered  · If urinary symptoms persist despite blood sugar control, can consider alternative etiology especially obstructive pathology such as BP  · Patient reports mild diarrhea yesterday and this morning but seems to be resolving at this time  If this persists later today and tomorrow, consider ordering C  difficile as patient has been on multiple antibiotics outpatient  Severe persistent asthma without complication  Assessment & Plan  Stable  Continue Symbicort 2 puffs twice daily and albuterol as needed    Obstructive sleep apnea  Assessment & Plan  Has not been using CPAP for a few years    Hyperlipidemia  Assessment & Plan  Continue home Lipitor 20 mg daily    Hypertension  Assessment & Plan  BP elevated 155/78 on admission, likely secondary to dehydration, asymptomatic  · Continue home lisinopril 40 mg daily  · Continue to monitor       VTE Pharmacologic Prophylaxis: VTE Score: 5 High Risk (Score >/= 5) - Pharmacological DVT Prophylaxis Ordered: enoxaparin (Lovenox)  Sequential Compression Devices Ordered  Code Status: Level 1 - Full Code   Discussion with family: Patient declined call to   Anticipated Length of Stay: Patient will be admitted on an inpatient basis with an anticipated length of stay of greater than 2 midnights secondary to Hyperglycemia and sepsis  Total Time Spent on Date of Encounter in care of patient: 65 minutes This time was spent on one or more of the following: performing physical exam; counseling and coordination of care; obtaining or reviewing history; documenting in the medical record; reviewing/ordering tests, medications or procedures; communicating with other healthcare professionals and discussing with patient's family/caregivers      Chief Complaint: Urinary frequency and incomplete emptying    History of Present Illness:  Waleska Barber is a 60 y o  male with a PMH of type 2 diabetes, HLD, HTN, TREE, asthma, gout who presents with urinary urgency and incomplete emptying for about 2 weeks now  Was seen by PCP on 614 and started on antibiotics for suspected UTI-has been on Macrobid, Bactrim, Cipro outpatient without improvement in symptoms  Was also started on prednisone outpatient for rash on bilateral arms  Patient reports no improvement in urinary symptoms with any antibiotics  Denies any dysuria, hematuria, flank pain, suprapubic tenderness  Has not been checking his blood sugar for about a week now since he ran out of supplies but reports blood sugars were less than 200 prior to that  Does report feeling thirsty right now and slight fatigue  Denies chest pain, dyspnea, abdominal pain, nausea, vomiting, headache, visual changes  Review of Systems:  Review of Systems   Constitutional: Positive for fatigue  Negative for chills and fever  HENT: Negative for ear pain and sore throat  Eyes: Negative for pain and visual disturbance  Respiratory: Negative for cough and shortness of breath  Cardiovascular: Negative for chest pain and palpitations  Gastrointestinal: Negative for abdominal pain and vomiting  Endocrine: Positive for polydipsia and polyuria  Genitourinary: Positive for frequency and urgency  Negative for dysuria and hematuria  Musculoskeletal: Negative for arthralgias and back pain  Skin: Negative for color change and rash  Neurological: Negative for seizures and syncope  All other systems reviewed and are negative        Past Medical and Surgical History:   Past Medical History:   Diagnosis Date   • Asthma    • Chronic pain    • Diabetes mellitus ([facility] [address] )    • Gout    • Hyperlipidemia    • Hypertension    • Impaired fasting glucose     last assessed: 12/16/2015   • Knee arthropathy     last assessed: 3/23/2016       Past Surgical History:   Procedure Laterality Date   • KNEE ARTHROSCOPY Bilateral    • KNEE SURGERY         Meds/Allergies:  Prior to Admission medications    Medication Sig Start Date End Date Taking?  Authorizing Provider   albuterol (PROVENTIL HFA,VENTOLIN HFA) 90 mcg/act inhaler Inhale 2 puffs every 4 (four) hours as needed for wheezing 12/21/22   Greta Brunner MD   allopurinol (ZYLOPRIM) 100 mg tablet take 2 tablets by mouth once daily 6/1/23   Greta Brunner MD   atorvastatin (LIPITOR) 20 mg tablet take 1 tablet by mouth once daily 6/1/23   Greta Brunner MD   budesonide-formoterol (Symbicort) 160-4 5 mcg/act inhaler Inhale 2 puffs 2 (two) times a day Rinse mouth after use 4/24/23   Greta Brunner MD   ciprofloxacin (CIPRO) 500 mg tablet Take 1 tablet (500 mg total) by mouth every 12 (twelve) hours for 10 days 6/22/23 7/2/23  Greta Brunner MD   Cyanocobalamin (VITAMIN B 12 PO) Take by mouth    Historical Provider, MD   Dulaglutide (Trulicity) 1 5 WT/9 9UO SOPN Inject 0 5 mL (1 5 mg total) under the skin once a week 9/20/22   Greta Brunner MD   glipiZIDE (GLUCOTROL) 5 mg tablet take 1 tablet by mouth twice a day BEFORE MEALS 6/1/23   Greta Brunner MD   HYDROcodone-acetaminophen [facility])  mg per tablet Take 1 tablet by mouth every 4 (four) hours as needed 3/1/21   Historical Provider, MD   indomethacin (INDOCIN) 25 mg capsule Take by mouth every 8 (eight) hours 12/15/15   Historical Provider, MD   lisinopril (ZESTRIL) 40 mg tablet take 1 tablet by mouth once daily 6/1/23   Greta Brunner MD   Magnesium 500 MG TABS Take by mouth    Historical Provider, MD   Multiple Vitamin (MULTI-VITAMIN DAILY PO) Take 1 tablet by mouth daily 3/5/14   Historical Provider, MD   potassium chloride (K-DUR,KLOR-CON) 10 mEq tablet Take 1 tablet by mouth 2 (two) times a day 8/16/17   Ed Hazel DO   rOPINIRole (REQUIP) 2 mg tablet take 1 tablet by mouth twice a day 6/1/23   Greta Brunner MD   sulfamethoxazole-trimethoprim (BACTRIM DS) 800-160 mg per tablet Take 1 tablet by mouth every 12 (twelve) hours for 10 days 6/19/23 6/29/23  Greta Brunner MD "  sulfamethoxazole-trimethoprim (BACTRIM DS) 800-160 mg per tablet Take 1 tablet by mouth every 12 (twelve) hours for 10 days 6/19/23 6/29/23  Mita Melendez MD   triamcinolone (KENALOG) 0 1 % cream Apply topically 2 (two) times a day 6/14/23   LISA Cheng   Trulicity 1 5 KP/6 8GE injection inject 0 5 milliliters ( 1 AND 1/2 milligrams ) subcutaneously ONCE EVERY WEEK 6/1/23   Mita Melendez MD   cetirizine (ZyrTEC) 10 mg tablet Take 10 mg by mouth daily  6/24/23  Historical Provider, MD   ipratropium-albuterol (DUO-NEB) 0 5-2 5 mg/3 mL nebulizer solution Take 1 vial (3 mL total) by nebulization 4 (four) times a day 10/30/19 6/24/23  Brown Mcdonald DO   predniSONE 20 mg tablet TAKE 1 TABLET BID X 5 DAYS THEN TAKE 1 TABLET QD FOR 5 DAYS 6/14/23 6/24/23  Tammy Claudetta Baseman, CRNP     I have reviewed home medications with patient personally  Allergies: Allergies   Allergen Reactions   • Other Cough and Nasal Congestion     Seasonal allergies        Social History:  Marital Status:       Substance Use History:   Social History     Substance and Sexual Activity   Alcohol Use Yes    Comment: 2 light beers daily     Social History     Tobacco Use   Smoking Status Never   Smokeless Tobacco Never     Social History     Substance and Sexual Activity   Drug Use Never       Family History:  Family History   Problem Relation Age of Onset   • Coronary artery disease Father    • Cancer Other         malignant neoplasm   • Asthma Brother    • No Known Problems Daughter    • Asthma Mother    • COPD Mother    • Asthma Sister    • No Known Problems Brother    • Asthma Son    • No Known Problems Son        Physical Exam:     Vitals:   Blood Pressure: 148/77 (06/24/23 1334)  Pulse: (!) 121 (06/24/23 1334)  Temperature: 98 6 °F (37 °C) (06/24/23 1316)  Respirations: 20 (06/24/23 1334)  Height: 5' 11\" (180 3 cm) (06/24/23 1439)  Weight - Scale: (!) 149 kg (328 lb) (06/24/23 1316)  SpO2: 95 % (06/24/23 1334)    Physical " Exam  Constitutional:       Appearance: Normal appearance  HENT:      Head: Normocephalic and atraumatic  Mouth/Throat:      Mouth: Mucous membranes are dry  Pharynx: Oropharynx is clear  Cardiovascular:      Rate and Rhythm: Normal rate and regular rhythm  Pulmonary:      Effort: Pulmonary effort is normal  No respiratory distress  Breath sounds: Normal breath sounds  No wheezing  Musculoskeletal:      Right lower leg: No edema  Left lower leg: No edema  Skin:     General: Skin is warm and dry  Capillary Refill: Capillary refill takes less than 2 seconds  Neurological:      General: No focal deficit present  Mental Status: He is alert and oriented to person, place, and time     Psychiatric:         Mood and Affect: Mood normal          Behavior: Behavior normal        Additional Data:     Lab Results:  Results from last 7 days   Lab Units 06/24/23  1351   WBC Thousand/uL 11 59*   HEMOGLOBIN g/dL 15 9   HEMATOCRIT % 47 7   PLATELETS Thousands/uL 152   NEUTROS PCT % 67   LYMPHS PCT % 19   MONOS PCT % 9   EOS PCT % 2     Results from last 7 days   Lab Units 06/24/23  1351   SODIUM mmol/L 122*   POTASSIUM mmol/L 4 5   CHLORIDE mmol/L 87*   CO2 mmol/L 24   BUN mg/dL 21   CREATININE mg/dL 1 02   ANION GAP mmol/L 11   CALCIUM mg/dL 9 6   ALBUMIN g/dL 3 9   TOTAL BILIRUBIN mg/dL 0 44   ALK PHOS U/L 96   ALT U/L 21   AST U/L 13   GLUCOSE RANDOM mg/dL 747*     Results from last 7 days   Lab Units 06/24/23  1351   INR  0 93     Results from last 7 days   Lab Units 06/24/23  1408   POC GLUCOSE mg/dl >500*         Results from last 7 days   Lab Units 06/24/23  1351   LACTIC ACID mmol/L 2 7*   PROCALCITONIN ng/ml 0 05       Lines/Drains:  Invasive Devices     Peripheral Intravenous Line  Duration           Peripheral IV 06/24/23 Right Antecubital <1 day                    Imaging: Personally reviewed the following imaging: chest xray  XR chest portable   ED Interpretation by Sandi Evans DO (06/24 2980)   No acute abnl          EKG and Other Studies Reviewed on Admission:   · EKG: Sinus Tachycardia    ** Please Note: This note has been constructed using a voice recognition system   **

## 2023-06-24 NOTE — ASSESSMENT & PLAN NOTE
· Patient reports urinary frequency and incomplete emptying for about 2 weeks now  · Seen by PCP outpatient for suspected UTI and started on Macrobid and then switched to Bactrim and then Cipro due to no improvement in symptoms  · Urine culture on 6/14/2023 showed 50-100K colonies of MDRO Klebsiella pneumonia  · UA on admission unremarkable   · No SIRS criteria   · Symptoms resolved since admission, suspect 2 2 hyperglycemia rather than infection  · Monitor off abx     · Resolved

## 2023-06-24 NOTE — LETTER
[facility] INTENSIVE CARE UNIT  [address] [address]  Dept: [phone]    June 26, 2023     Patient: Chuck Caal   YOB: 1965   Date of Visit: 6/24/2023       To Whom it May Concern:      Chuck Caal is currently hospitalized under my professional care  He was hospitalized from 6/24/2023 to 06/26/23  Please excuse from work during this time  Patient may return to work at previous activity level on/or after 6/28/2023  Feel free to call with any questions      Sincerely,            DO Tameka Bergeron [facility]  Diplomate, American Board of Internal Medicine

## 2023-06-24 NOTE — SEPSIS NOTE
Sepsis Note   Waleska Barber 60 y o  male MRN: [id number]  Unit/Bed#: ED 12 Encounter: [id number]       Initial Sepsis Screening     Row Name 06/24/23 1457                Is the patient's history suggestive of a new or worsening infection? Yes (Proceed)  -RIO        Suspected source of infection urinary tract infection  -RIO        Indicate SIRS criteria Tachycardia > 90 bpm  -RIO        Are two or more of the above signs & symptoms of infection both present and new to the patient? No  -RIO              User Key  (r) = Recorded By, (t) = Taken By, (c) = Cosigned By    [address] Name Provider Type    [address], DO Physician                    Body mass index is 45 75 kg/m²    Wt Readings from Last 1 Encounters:   06/24/23 (!) 149 kg (328 lb)     IBW (Ideal Body Weight): 75 3 kg    Ideal body weight: 75 3 kg (166 lb 0 1 oz)  Adjusted ideal body weight: 105 kg (230 lb 12 9 oz)

## 2023-06-24 NOTE — ASSESSMENT & PLAN NOTE
Lab Results   Component Value Date    HGBA1C 7 0 (H) 02/03/2023   Patient presenting with blood sugar 747, normal anion gap 11, borderline elevated BHB 0 7, mild lactic acidosis 2 7 but otherwise normal CO2 with symptoms of hyperglycemia of urinary frequency, no altered mentation  · Does not meet criteria for DKA or HHS  · Hyperglycemia likely secondary to recent outpatient prednisone course and suspected UTI  · Started with IV regular insulin 10 units x 1  · We will check blood sugar every 2 hours  · Start IV insulin drip------  · Repeat hgb A1c ordered  · Home regimen of glipizide and Trulicity held

## 2023-06-25 PROBLEM — R35.0 URINARY FREQUENCY: Status: RESOLVED | Noted: 2023-06-24 | Resolved: 2023-06-25

## 2023-06-25 LAB
ALBUMIN SERPL BCP-MCNC: 3.6 G/DL (ref 3.5–5)
ALP SERPL-CCNC: 76 U/L (ref 34–104)
ALT SERPL W P-5'-P-CCNC: 17 U/L (ref 7–52)
ANION GAP SERPL CALCULATED.3IONS-SCNC: 6 MMOL/L
ANION GAP SERPL CALCULATED.3IONS-SCNC: 8 MMOL/L
AST SERPL W P-5'-P-CCNC: 13 U/L (ref 13–39)
ATRIAL RATE: 113 BPM
BASOPHILS # BLD AUTO: 0.06 THOUSANDS/ÂΜL (ref 0–0.1)
BASOPHILS NFR BLD AUTO: 1 % (ref 0–1)
BILIRUB SERPL-MCNC: 0.46 MG/DL (ref 0.2–1)
BUN SERPL-MCNC: 14 MG/DL (ref 5–25)
BUN SERPL-MCNC: 16 MG/DL (ref 5–25)
CALCIUM SERPL-MCNC: 8.8 MG/DL (ref 8.4–10.2)
CALCIUM SERPL-MCNC: 9.1 MG/DL (ref 8.4–10.2)
CHLORIDE SERPL-SCNC: 96 MMOL/L (ref 96–108)
CHLORIDE SERPL-SCNC: 97 MMOL/L (ref 96–108)
CO2 SERPL-SCNC: 26 MMOL/L (ref 21–32)
CO2 SERPL-SCNC: 28 MMOL/L (ref 21–32)
CREAT SERPL-MCNC: 0.79 MG/DL (ref 0.6–1.3)
CREAT SERPL-MCNC: 0.86 MG/DL (ref 0.6–1.3)
EOSINOPHIL # BLD AUTO: 0.39 THOUSAND/ÂΜL (ref 0–0.61)
EOSINOPHIL NFR BLD AUTO: 4 % (ref 0–6)
ERYTHROCYTE [DISTWIDTH] IN BLOOD BY AUTOMATED COUNT: 12.6 % (ref 11.6–15.1)
GFR SERPL CREATININE-BSD FRML MDRD: 94 ML/MIN/1.73SQ M
GFR SERPL CREATININE-BSD FRML MDRD: 98 ML/MIN/1.73SQ M
GLUCOSE SERPL-MCNC: 101 MG/DL (ref 65–140)
GLUCOSE SERPL-MCNC: 140 MG/DL (ref 65–140)
GLUCOSE SERPL-MCNC: 144 MG/DL (ref 65–140)
GLUCOSE SERPL-MCNC: 149 MG/DL (ref 65–140)
GLUCOSE SERPL-MCNC: 151 MG/DL (ref 65–140)
GLUCOSE SERPL-MCNC: 165 MG/DL (ref 65–140)
GLUCOSE SERPL-MCNC: 169 MG/DL (ref 65–140)
GLUCOSE SERPL-MCNC: 174 MG/DL (ref 65–140)
GLUCOSE SERPL-MCNC: 181 MG/DL (ref 65–140)
GLUCOSE SERPL-MCNC: 187 MG/DL (ref 65–140)
GLUCOSE SERPL-MCNC: 202 MG/DL (ref 65–140)
GLUCOSE SERPL-MCNC: 204 MG/DL (ref 65–140)
GLUCOSE SERPL-MCNC: 235 MG/DL (ref 65–140)
GLUCOSE SERPL-MCNC: 252 MG/DL (ref 65–140)
GLUCOSE SERPL-MCNC: 312 MG/DL (ref 65–140)
GLUCOSE SERPL-MCNC: 328 MG/DL (ref 65–140)
HCT VFR BLD AUTO: 45.9 % (ref 36.5–49.3)
HGB BLD-MCNC: 15.3 G/DL (ref 12–17)
IMM GRANULOCYTES # BLD AUTO: 0.14 THOUSAND/UL (ref 0–0.2)
IMM GRANULOCYTES NFR BLD AUTO: 1 % (ref 0–2)
LYMPHOCYTES # BLD AUTO: 2.39 THOUSANDS/ÂΜL (ref 0.6–4.47)
LYMPHOCYTES NFR BLD AUTO: 22 % (ref 14–44)
MCH RBC QN AUTO: 28.8 PG (ref 26.8–34.3)
MCHC RBC AUTO-ENTMCNC: 33.3 G/DL (ref 31.4–37.4)
MCV RBC AUTO: 86 FL (ref 82–98)
MONOCYTES # BLD AUTO: 0.99 THOUSAND/ÂΜL (ref 0.17–1.22)
MONOCYTES NFR BLD AUTO: 9 % (ref 4–12)
NEUTROPHILS # BLD AUTO: 7 THOUSANDS/ÂΜL (ref 1.85–7.62)
NEUTS SEG NFR BLD AUTO: 63 % (ref 43–75)
NRBC BLD AUTO-RTO: 0 /100 WBCS
P AXIS: 11 DEGREES
PLATELET # BLD AUTO: 130 THOUSANDS/UL (ref 149–390)
PMV BLD AUTO: 9.5 FL (ref 8.9–12.7)
POTASSIUM SERPL-SCNC: 4 MMOL/L (ref 3.5–5.3)
POTASSIUM SERPL-SCNC: 4.1 MMOL/L (ref 3.5–5.3)
PR INTERVAL: 144 MS
PROT SERPL-MCNC: 6.6 G/DL (ref 6.4–8.4)
QRS AXIS: -72 DEGREES
QRSD INTERVAL: 98 MS
QT INTERVAL: 338 MS
QTC INTERVAL: 463 MS
RBC # BLD AUTO: 5.32 MILLION/UL (ref 3.88–5.62)
SODIUM SERPL-SCNC: 130 MMOL/L (ref 135–147)
SODIUM SERPL-SCNC: 131 MMOL/L (ref 135–147)
T WAVE AXIS: 66 DEGREES
VENTRICULAR RATE: 113 BPM
WBC # BLD AUTO: 10.97 THOUSAND/UL (ref 4.31–10.16)

## 2023-06-25 PROCEDURE — 80053 COMPREHEN METABOLIC PANEL: CPT | Performed by: STUDENT IN AN ORGANIZED HEALTH CARE EDUCATION/TRAINING PROGRAM

## 2023-06-25 PROCEDURE — 80048 BASIC METABOLIC PNL TOTAL CA: CPT | Performed by: STUDENT IN AN ORGANIZED HEALTH CARE EDUCATION/TRAINING PROGRAM

## 2023-06-25 PROCEDURE — 82948 REAGENT STRIP/BLOOD GLUCOSE: CPT

## 2023-06-25 PROCEDURE — 36415 COLL VENOUS BLD VENIPUNCTURE: CPT | Performed by: STUDENT IN AN ORGANIZED HEALTH CARE EDUCATION/TRAINING PROGRAM

## 2023-06-25 PROCEDURE — 99232 SBSQ HOSP IP/OBS MODERATE 35: CPT

## 2023-06-25 PROCEDURE — 93010 ELECTROCARDIOGRAM REPORT: CPT | Performed by: INTERNAL MEDICINE

## 2023-06-25 PROCEDURE — 85025 COMPLETE CBC W/AUTO DIFF WBC: CPT | Performed by: STUDENT IN AN ORGANIZED HEALTH CARE EDUCATION/TRAINING PROGRAM

## 2023-06-25 RX ORDER — INSULIN GLARGINE 100 [IU]/ML
40 INJECTION, SOLUTION SUBCUTANEOUS EVERY 12 HOURS SCHEDULED
Status: DISCONTINUED | OUTPATIENT
Start: 2023-06-25 | End: 2023-06-26 | Stop reason: HOSPADM

## 2023-06-25 RX ORDER — INSULIN LISPRO 100 [IU]/ML
2-12 INJECTION, SOLUTION INTRAVENOUS; SUBCUTANEOUS
Status: DISCONTINUED | OUTPATIENT
Start: 2023-06-26 | End: 2023-06-26 | Stop reason: HOSPADM

## 2023-06-25 RX ORDER — INSULIN LISPRO 100 [IU]/ML
1-6 INJECTION, SOLUTION INTRAVENOUS; SUBCUTANEOUS
Status: DISCONTINUED | OUTPATIENT
Start: 2023-06-25 | End: 2023-06-26 | Stop reason: HOSPADM

## 2023-06-25 RX ORDER — INSULIN LISPRO 100 [IU]/ML
26 INJECTION, SOLUTION INTRAVENOUS; SUBCUTANEOUS
Status: DISCONTINUED | OUTPATIENT
Start: 2023-06-26 | End: 2023-06-26 | Stop reason: HOSPADM

## 2023-06-25 RX ADMIN — ROPINIROLE 2 MG: 2 TABLET, FILM COATED ORAL at 17:15

## 2023-06-25 RX ADMIN — BUDESONIDE AND FORMOTEROL FUMARATE DIHYDRATE 2 PUFF: 160; 4.5 AEROSOL RESPIRATORY (INHALATION) at 17:15

## 2023-06-25 RX ADMIN — POTASSIUM CHLORIDE 10 MEQ: 750 TABLET, EXTENDED RELEASE ORAL at 17:15

## 2023-06-25 RX ADMIN — SODIUM CHLORIDE 6 UNITS/HR: 9 INJECTION, SOLUTION INTRAVENOUS at 05:14

## 2023-06-25 RX ADMIN — SODIUM CHLORIDE 4 UNITS/HR: 9 INJECTION, SOLUTION INTRAVENOUS at 01:08

## 2023-06-25 RX ADMIN — BUDESONIDE AND FORMOTEROL FUMARATE DIHYDRATE 2 PUFF: 160; 4.5 AEROSOL RESPIRATORY (INHALATION) at 08:05

## 2023-06-25 RX ADMIN — ATORVASTATIN CALCIUM 20 MG: 20 TABLET, FILM COATED ORAL at 08:02

## 2023-06-25 RX ADMIN — SODIUM CHLORIDE 6 UNITS/HR: 9 INJECTION, SOLUTION INTRAVENOUS at 05:56

## 2023-06-25 RX ADMIN — POTASSIUM CHLORIDE 10 MEQ: 750 TABLET, EXTENDED RELEASE ORAL at 08:02

## 2023-06-25 RX ADMIN — SODIUM CHLORIDE 6 UNITS/HR: 9 INJECTION, SOLUTION INTRAVENOUS at 00:04

## 2023-06-25 RX ADMIN — SODIUM CHLORIDE, PRESERVATIVE FREE 3 ML: 5 INJECTION INTRAVENOUS at 08:11

## 2023-06-25 RX ADMIN — ENOXAPARIN SODIUM 40 MG: 100 INJECTION SUBCUTANEOUS at 21:11

## 2023-06-25 RX ADMIN — LISINOPRIL 40 MG: 20 TABLET ORAL at 08:02

## 2023-06-25 RX ADMIN — INSULIN GLARGINE 40 UNITS: 100 INJECTION, SOLUTION SUBCUTANEOUS at 21:11

## 2023-06-25 RX ADMIN — Medication 500 MG: at 08:09

## 2023-06-25 RX ADMIN — SODIUM CHLORIDE, PRESERVATIVE FREE 3 ML: 5 INJECTION INTRAVENOUS at 21:12

## 2023-06-25 RX ADMIN — ALLOPURINOL 200 MG: 100 TABLET ORAL at 08:02

## 2023-06-25 RX ADMIN — INSULIN LISPRO 3 UNITS: 100 INJECTION, SOLUTION INTRAVENOUS; SUBCUTANEOUS at 21:11

## 2023-06-25 RX ADMIN — ENOXAPARIN SODIUM 40 MG: 100 INJECTION SUBCUTANEOUS at 08:02

## 2023-06-25 RX ADMIN — ROPINIROLE 2 MG: 2 TABLET, FILM COATED ORAL at 08:09

## 2023-06-25 RX ADMIN — SODIUM CHLORIDE 1 UNITS/HR: 9 INJECTION, SOLUTION INTRAVENOUS at 17:14

## 2023-06-25 NOTE — QUICK NOTE
Endocrinology quick note:  Contacted by primary team regarding recommendations for transition to basal bolus regimen this evening  Chart was reviewed  Formal consult to be completed tomorrow  61 yo who presented with polydipsia, increased urinary frequency, found to have hyperglycemia with glucose 747 on presentation  He was started on insulin drip  Insulin drip requirements show approximately 155 units in 18 hours from 6pm to 12pm today, it is reasonable to transition to basal bolus insulin this evening with 40 units lantus q12h, humalog 26 units TID with meals, and correctional coverage  Primary team to place orders      Jeffrey Kaye DO  Endocrinology Fellow, FRQ9

## 2023-06-25 NOTE — MALNUTRITION/BMI
This medical record reflects one or more clinical indicators suggestive of malnutrition and/or morbid obesity  Malnutrition Findings:                                 BMI Findings:  Adult BMI Classifications: Morbid Obesity 40-44 9        Body mass index is 44 28 kg/m²  See Nutrition note dated 06/25/2023 for additional details  Completed nutrition assessment is viewable in the nutrition documentation

## 2023-06-25 NOTE — PLAN OF CARE
Problem: MOBILITY - ADULT  Goal: Maintain or return to baseline ADL function  Description: INTERVENTIONS:  -  Assess patient's ability to carry out ADLs; assess patient's baseline for ADL function and identify physical deficits which impact ability to perform ADLs (bathing, care of mouth/teeth, toileting, grooming, dressing, etc )  - Assess/evaluate cause of self-care deficits   - Assess range of motion  - Assess patient's mobility; develop plan if impaired  - Assess patient's need for assistive devices and provide as appropriate  - Encourage maximum independence but intervene and supervise when necessary  - Involve family in performance of ADLs  - Assess for home care needs following discharge   - Consider OT consult to assist with ADL evaluation and planning for discharge  - Provide patient education as appropriate  Outcome: Progressing  Goal: Maintains/Returns to pre admission functional level  Description: INTERVENTIONS:  - Perform BMAT or MOVE assessment daily    - Set and communicate daily mobility goal to care team and patient/family/caregiver     - Collaborate with rehabilitation services on mobility goals if consulted  - Out of bed for toileting  - Record patient progress and toleration of activity level   Outcome: Progressing     Problem: PAIN - ADULT  Goal: Verbalizes/displays adequate comfort level or baseline comfort level  Description: Interventions:  - Encourage patient to monitor pain and request assistance  - Assess pain using appropriate pain scale  - Administer analgesics based on type and severity of pain and evaluate response  - Implement non-pharmacological measures as appropriate and evaluate response  - Consider cultural and social influences on pain and pain management  - Notify physician/advanced practitioner if interventions unsuccessful or patient reports new pain  Outcome: Progressing     Problem: INFECTION - ADULT  Goal: Absence or prevention of progression during hospitalization  Description: INTERVENTIONS:  - Assess and monitor for signs and symptoms of infection  - Monitor lab/diagnostic results  - Monitor all insertion sites, i e  indwelling lines, tubes, and drains  - Monitor endotracheal if appropriate and nasal secretions for changes in amount and color  - [place] appropriate cooling/warming therapies per order  - Administer medications as ordered  - Instruct and encourage patient and family to use good hand hygiene technique  - Identify and instruct in appropriate isolation precautions for identified infection/condition  Outcome: Progressing  Goal: Absence of fever/infection during neutropenic period  Description: INTERVENTIONS:  - Monitor WBC    Outcome: Progressing     Problem: SAFETY ADULT  Goal: Maintain or return to baseline ADL function  Description: INTERVENTIONS:  -  Assess patient's ability to carry out ADLs; assess patient's baseline for ADL function and identify physical deficits which impact ability to perform ADLs (bathing, care of mouth/teeth, toileting, grooming, dressing, etc )  - Assess/evaluate cause of self-care deficits   - Assess range of motion  - Assess patient's mobility; develop plan if impaired  - Assess patient's need for assistive devices and provide as appropriate  - Encourage maximum independence but intervene and supervise when necessary  - Involve family in performance of ADLs  - Assess for home care needs following discharge   - Consider OT consult to assist with ADL evaluation and planning for discharge  - Provide patient education as appropriate  Outcome: Progressing  Goal: Maintains/Returns to pre admission functional level  Description: INTERVENTIONS:  - Perform BMAT or MOVE assessment daily    - Set and communicate daily mobility goal to care team and patient/family/caregiver     - Collaborate with rehabilitation services on mobility goals if consulted  - Out of bed for toileting  - Record patient progress and toleration of activity level   Outcome: Progressing  Goal: Patient will remain free of falls  Description: INTERVENTIONS:  - Educate patient/family on patient safety including physical limitations  - Instruct patient to call for assistance with activity   - Consult OT/PT to assist with strengthening/mobility   - Keep Call bell within reach  - Keep bed low and locked with side rails adjusted as appropriate  - Keep care items and personal belongings within reach  - Initiate and maintain comfort rounds  - Make Fall Risk Sign visible to staff  - Apply yellow socks and bracelet for high fall risk patients  - Consider moving patient to room near nurses station  Outcome: Progressing     Problem: DISCHARGE PLANNING  Goal: Discharge to home or other facility with appropriate resources  Description: INTERVENTIONS:  - Identify barriers to discharge w/patient and caregiver  - Arrange for needed discharge resources and transportation as appropriate  - Identify discharge learning needs (meds, wound care, etc )  - Arrange for interpretive services to assist at discharge as needed  - Refer to Case Management Department for coordinating discharge planning if the patient needs post-hospital services based on physician/advanced practitioner order or complex needs related to functional status, cognitive ability, or social support system  Outcome: Progressing     Problem: Knowledge Deficit  Goal: Patient/family/caregiver demonstrates understanding of disease process, treatment plan, medications, and discharge instructions  Description: Complete learning assessment and assess knowledge base  Interventions:  - Provide teaching at level of understanding  - Provide teaching via preferred learning methods  Outcome: Progressing     Problem: Nutrition/Hydration-ADULT  Goal: Nutrient/Hydration intake appropriate for improving, restoring or maintaining nutritional needs  Description: Monitor and assess patient's nutrition/hydration status for malnutrition  Collaborate with interdisciplinary team and initiate plan and interventions as ordered  Monitor patient's weight and dietary intake as ordered or per policy  Utilize nutrition screening tool and intervene as necessary  Determine patient's food preferences and provide high-protein, high-caloric foods as appropriate       INTERVENTIONS:  - Monitor oral intake, urinary output, labs, and treatment plans  - Assess nutrition and hydration status and recommend course of action  - Evaluate amount of meals eaten  - Assist patient with eating if necessary   - Allow adequate time for meals  - Recommend/ encourage appropriate diets, oral nutritional supplements, and vitamin/mineral supplements  - Order, calculate, and assess calorie counts as needed  - Recommend, monitor, and adjust tube feedings and TPN/PPN based on assessed needs  - Assess need for intravenous fluids  - Provide specific nutrition/hydration education as appropriate  - Include patient/family/caregiver in decisions related to nutrition  Outcome: Progressing

## 2023-06-25 NOTE — DISCHARGE SUMMARY
[place]  Discharge- Mini Diaz 1965, 60 y o  male MRN: [id number]  Unit/Bed#: -01 SDU Encounter: [id number]  Primary Care Provider: Ladi Saunders MD   Date and time admitted to hospital: 6/24/2023  1:26 PM    No new Assessment & Plan notes have been filed under this hospital service since the last note was generated  Service: Hospitalist      Medical Problems     Resolved Problems  Date Reviewed: 6/25/2023          Resolved    Urinary frequency 6/25/2023     Resolved by  Campos Henry PA-C        Discharging Physician / Practitioner: Campos Henry PA-C  PCP: Ladi Saunders MD  Admission Date:   Admission Orders (From admission, onward)     Ordered        06/24/23 Nab[address] 6  Once                      Discharge Date: 06/25/23    Consultations During Hospital Stay:  · Endocrinology     Procedures Performed:   XR chest portable   ED Interpretation by Adarsh Christianson DO (06/24 1501)   No acute abnl      Final Result by Anthony Pearson MD (06/24 1922)      No acute cardiopulmonary disease  Workstation performed: [id number]         ·   ·     Significant Findings / Test Results:   · BG on admission > 700   · HbA1C 11 2  · Lactic 2 7 --> 1 6  · BHB 0 7    Incidental Findings:   · None     Test Results Pending at Discharge (will require follow up): · None      Outpatient Tests Requested:  · None     Complications:  None     Reason for Admission: urinary frequency     Hospital Course:   Mini Diaz is a 60 y o  male patient with PMH of DM2, asthma, HTN, HLD, TREE  who originally presented to the hospital on 6/24/2023 due to urinary frequency + increased thirst which initially began 2-3 weeks ago with acute worsening in past several days  Patient was initially seen by PCP who prescribed PO abx for possible UTI however patient was without improvement   On admission patient was with hyperglycemia with BG > 500 however did "not meet DKA  HHS criteria  Patient was initiated on insulin gtt with consult to endocrinology given repeat HbA1C 11 2  Patient was initiated on *** which will be continued on discharge  New diabetic supplies were price checked and prescribed on discharge  Prior to discharge all labs, vitals, BG stable  Patient will follow up with outpatient endocrinology  {Complete this smartphrase if the patient is an inpatient and being discharged earlier than 2 midnights  If this does not apply, please delete this line:[id number]}    Please see above list of diagnoses and related plan for additional information  Condition at Discharge: stable    Discharge Day Visit / Exam:   Subjective:  ***  Vitals: Blood Pressure: 112/68 (06/25/23 1121)  Pulse: (!) 111 (06/25/23 1121)  Temperature: 98 1 °F (36 7 °C) (06/25/23 1121)  Temp Source: Oral (06/25/23 1121)  Respirations: 16 (06/25/23 1121)  Height: 5' 11\" (180 3 cm) (06/25/23 0535)  Weight - Scale: (!) 144 kg (317 lb 7 4 oz) (06/25/23 0535)  SpO2: 95 % (06/25/23 1121)  Exam:   Physical Exam ***    Discussion with Family: {Family Communication:52339}    Discharge instructions/Information to patient and family:   See after visit summary for information provided to patient and family  Provisions for Follow-Up Care:  See after visit summary for information related to follow-up care and any pertinent home health orders  Disposition:   {Disposition on Discharge:42956}    Planned Readmission: None      Discharge Statement:  I spent 60 minutes discharging the patient  This time was spent on the day of discharge  I had direct contact with the patient on the day of discharge  Greater than 50% of the total time was spent examining patient, answering all patient questions, arranging and discussing plan of care with patient as well as directly providing post-discharge instructions  Additional time then spent on discharge activities      Discharge Medications:  See after visit summary " for reconciled discharge medications provided to patient and/or family        **Please Note: This note may have been constructed using a voice recognition system**    *** change date ***

## 2023-06-25 NOTE — PROGRESS NOTES
[place]  Progress Note  Name: Javier Pardo  MRN: [id number]  Unit/Bed#: -01 SDU I Date of Admission: 6/24/2023   Date of Service: 6/25/2023 I Hospital Day: 1    Assessment/Plan   * Hyperglycemia due to type 2 diabetes mellitus [facility])  Assessment & Plan  Presented due to increased thirst, urinary frequency worsening in past few days over (initially began about 3 weeks ago)  Was initially started on PO abx as outpatient due to concern for UTI  · BG on admission 747 --> no evidence DKA/HHS   · Prior regimen: glipizide + trulicity   · Prior VIK4V 7 (4 months ago) --> repeat HbA1C is 11 2   · Initiated on non-DKA insulin gtt + IVF on admission, BG currently in 300s   · Endocrinology consult, appreciate recs  · Initiate Lantus 40 units BID --> will initiate tonight, DC gtt 1 hour after SC administration  · Initiate Humalog 26 units TID AC tomorrow 6/26 + ISS  · Carb controlled diet   · Will need new diabetic supplies on discharge given new insulin     Urinary frequency-resolved as of 6/25/2023  Assessment & Plan  · Patient reports urinary frequency and incomplete emptying for about 2 weeks now  · Seen by PCP outpatient for suspected UTI and started on Macrobid and then switched to Bactrim and then Cipro due to no improvement in symptoms  · Urine culture on 6/14/2023 showed 50-100K colonies of MDRO Klebsiella pneumonia  · UA on admission unremarkable   · No SIRS criteria   · Symptoms resolved since admission, suspect 2 2 hyperglycemia rather than infection  · Monitor off abx     · Resolved     Severe persistent asthma without complication  Assessment & Plan  · Recently completed outpatient PO prednisone taper 1 week ago  · No symptoms currently    Obstructive sleep apnea  Assessment & Plan  Has not been using CPAP for a few years    Hyperlipidemia  Assessment & Plan  Continue home Lipitor 20 mg daily    Hypertension  Assessment & Plan  · Continue home lisinopril 40 mg daily  · BP stable              VTE Pharmacologic Prophylaxis: VTE Score: 5 High Risk (Score >/= 5) - Pharmacological DVT Prophylaxis Ordered: enoxaparin (Lovenox)  Sequential Compression Devices Ordered  Patient Centered Rounds: I performed bedside rounds with nursing staff today  Discussions with Specialists or Other Care Team Provider: Endocrine    Education and Discussions with Family / Patient: Updated  (wife) at bedside  Total Time Spent on Date of Encounter in care of patient: 35 minutes This time was spent on one or more of the following: performing physical exam; counseling and coordination of care; obtaining or reviewing history; documenting in the medical record; reviewing/ordering tests, medications or procedures; communicating with other healthcare professionals and discussing with patient's family/caregivers  Current Length of Stay: 1 day(s)  Current Patient Status: Inpatient   Certification Statement: The patient will continue to require additional inpatient hospital stay due to IV insulin gtt, BG monitoring   Discharge Plan: Anticipate discharge tomorrow to home  Code Status: Level 1 - Full Code    Subjective:   Patient reports thirst and urinary frequency are subsiding  Denies any additional complaints  Objective:     Vitals:   Temp (24hrs), Av °F (36 7 °C), Min:97 8 °F (36 6 °C), Max:98 1 °F (36 7 °C)    Temp:  [97 8 °F (36 6 °C)-98 1 °F (36 7 °C)] 98 1 °F (36 7 °C)  HR:  [] 111  Resp:  [16-22] 16  BP: (112-167)/(68-80) 112/68  SpO2:  [91 %-97 %] 95 %  Body mass index is 44 28 kg/m²  Input and Output Summary (last 24 hours): Intake/Output Summary (Last 24 hours) at 2023 1406  Last data filed at 2023 0900  Gross per 24 hour   Intake 1630 03 ml   Output 300 ml   Net 1330 03 ml       Physical Exam:   Physical Exam  Vitals and nursing note reviewed  Constitutional:       General: He is not in acute distress  Appearance: He is well-developed   He is not ill-appearing  HENT:      Head: Normocephalic and atraumatic  Eyes:      General:         Right eye: No discharge  Left eye: No discharge  Extraocular Movements: Extraocular movements intact  Conjunctiva/sclera: Conjunctivae normal    Cardiovascular:      Rate and Rhythm: Normal rate and regular rhythm  Heart sounds: No murmur heard  Pulmonary:      Effort: Pulmonary effort is normal  No respiratory distress  Breath sounds: Normal breath sounds  No wheezing, rhonchi or rales  Abdominal:      General: Bowel sounds are normal  There is no distension  Palpations: Abdomen is soft  Tenderness: There is no abdominal tenderness  Musculoskeletal:      Cervical back: Neck supple  Right lower leg: No edema  Left lower leg: No edema  Skin:     General: Skin is warm and dry  Capillary Refill: Capillary refill takes less than 2 seconds  Neurological:      General: No focal deficit present  Mental Status: He is alert and oriented to person, place, and time  Mental status is at baseline  Cranial Nerves: No cranial nerve deficit     Psychiatric:         Mood and Affect: Mood normal          Behavior: Behavior normal           Additional Data:     Labs:  Results from last 7 days   Lab Units 06/25/23  0525   WBC Thousand/uL 10 97*   HEMOGLOBIN g/dL 15 3   HEMATOCRIT % 45 9   PLATELETS Thousands/uL 130*   NEUTROS PCT % 63   LYMPHS PCT % 22   MONOS PCT % 9   EOS PCT % 4     Results from last 7 days   Lab Units 06/25/23  0525   SODIUM mmol/L 131*   POTASSIUM mmol/L 4 0   CHLORIDE mmol/L 97   CO2 mmol/L 26   BUN mg/dL 14   CREATININE mg/dL 0 79   ANION GAP mmol/L 8   CALCIUM mg/dL 8 8   ALBUMIN g/dL 3 6   TOTAL BILIRUBIN mg/dL 0 46   ALK PHOS U/L 76   ALT U/L 17   AST U/L 13   GLUCOSE RANDOM mg/dL 187*     Results from last 7 days   Lab Units 06/24/23  1351   INR  0 93     Results from last 7 days   Lab Units 06/25/23  1153 06/25/23  0951 06/25/23  0756 06/25/23  0645 06/25/23  4766 06/25/23  0407 06/25/23  3262 06/25/23  0203 06/25/23  0107 06/25/23  0003 06/24/23  2259 06/24/23  2159   POC GLUCOSE mg/dl 328* 312* 165* 169* 202* 149* 140 144* 151* 181* 287* 333*     Results from last 7 days   Lab Units 06/24/23  1650   HEMOGLOBIN A1C % 11 2*     Results from last 7 days   Lab Units 06/24/23  1650 06/24/23  1351   LACTIC ACID mmol/L 1 6 2 7*   PROCALCITONIN ng/ml  --  0 05       Lines/Drains:  Invasive Devices     Peripheral Intravenous Line  Duration           Peripheral IV 06/24/23 Right Antecubital 1 day    Peripheral IV 06/25/23 Dorsal (posterior); Left Hand <1 day                      Imaging: Reviewed radiology reports from this admission including: chest xray    Recent Cultures (last 7 days):   Results from last 7 days   Lab Units 06/24/23  1351   BLOOD CULTURE  Received in Microbiology Lab  Culture in Progress  Received in Microbiology Lab  Culture in Progress         Last 24 Hours Medication List:   Current Facility-Administered Medications   Medication Dose Route Frequency Provider Last Rate   • acetaminophen  650 mg Oral Q6H PRN Polly Alexander, DO     • albuterol  2 puff Inhalation Q4H PRN Polly Alexander DO     • allopurinol  200 mg Oral Daily Marily Hilton, DO     • atorvastatin  20 mg Oral Daily Marily Canela, DO     • budesonide-formoterol  2 puff Inhalation BID Polly Alexander, DO     • enoxaparin  40 mg Subcutaneous Q12H Marily Canela, DO     • insulin regular (HumuLIN R,NovoLIN R) 1 Units/mL in sodium chloride 0 9 % 100 mL infusion  0 3-21 Units/hr Intravenous Titrated Marily Canela, DO 14 Units/hr (06/25/23 1154)   • lisinopril  40 mg Oral Daily Marily Canela, DO     • magnesium gluconate  500 mg Oral Daily Marily Canela, DO     • potassium chloride  10 mEq Oral BID Polly Alexander, DO     • rOPINIRole  2 mg Oral BID Polly Alexander, DO     • sodium chloride (PF)  3 mL Intravenous Q8H [address] [place] [address],           Today, Patient Was Seen By: Juliette Dominguez PA-C    **Please Note: This note may have been constructed using a voice recognition system  **

## 2023-06-25 NOTE — UTILIZATION REVIEW
Initial Clinical Review    Admission: Date/Time/Statement:   Admission Orders (From admission, onward)     Ordered        06/24/23 1503  INPATIENT ADMISSION  Once                      Orders Placed This Encounter   Procedures   • INPATIENT ADMISSION     Standing Status:   Standing     Number of Occurrences:   1     Order Specific Question:   Level of Care     Answer:   Med Surg [16]     Order Specific Question:   Estimated length of stay     Answer:   More than 2 Midnights     Order Specific Question:   Certification     Answer:   I certify that inpatient services are medically necessary for this patient for a duration of greater than two midnights  See H&P and MD Progress Notes for additional information about the patient's course of treatment  ED Arrival Information     Expected   -    Arrival   6/24/2023 13:07    Acuity   Urgent            Means of arrival   Walk-In    Escorted by   Self    Service   Hospitalist    Admission type   Emergency            Arrival complaint   urinary frequency, weak           Chief Complaint   Patient presents with   • Urinary Frequency     Pt to ED c/o urinary frequency x 1 week  Pt states he is going about every hour  Pt does not feel like he's emptying bladder  Pt states dr Francois Maradiaga tried 3 antibiotics with no Improvement  Initial Presentation: 60 y o  male PMH type 2 diabetes, HLD, HTN, TREE, asthma, gout to ED  presents self with urinary urgency and incomplete emptying for about 2 weeks  Followed OP PCP on 614 and started on antibiotics for suspected UTI-; prescribed  Macrobid, Bactrim, Cipro  without improvement in symptoms  Started on prednisone outpatient for rash on bilateral arms  No improvement in urinary symptoms with any antibiotics  Denies any dysuria, hematuria, flank pain, suprapubic tenderness  Unable to test  blood sugar for about a week now since he ran out of supplies but reports blood sugars were less than 200 prior to that    Does report feeling thirsty right now and slight fatigue  EXAM  Tachycardia, rales; bladder scan 233 then 177 mils within 1 HR, Labs blood sugar 747, normal anion gap 11, borderline elevated BHB 0 7, mild lactic acidosis 2 7    Inpatient med surg admission due to Hyperglycemia due to DMs, Urinary frequency   Start non DKA IV Insulin GTT, freq blood glucose checks, IVF,   repeat A1c if elevated consult ENDO for adjust home regimen; urinary retention protocol; if Urinary symptoms persist despite blood sugar control consider alternative etioogy naz Obstructive pathology  Cont home meds  Date: 6/25/2023   Day 2:   Critical Care  Endo consult w recs appreciated due to Prior HbA1C 7 (4 months ago) --> repeat HbA1C is 11 2   Initiate Lantus 40 units BID --> will initiate tonight, DC gtt 1 hour after SC administration  Initiate Humalog 26 units TID AC tomorrow 6/26 + ISS  Carb controlled diet Will need new diabetic supplies on discharge given new insulin   ENDO Fellow  Recommendation for transition to basal bolus Insulin drip requirements show approximately 155 units in 18 hours from 6pm to 12pm today, it is reasonable to transition to basal bolus insulin this evening with 40 units lantus q12h, humalog 26 units TID with meals, and correctional coverage       ED Triage Vitals   Temperature Pulse Respirations Blood Pressure SpO2   06/24/23 1316 06/24/23 1316 06/24/23 1316 06/24/23 1316 06/24/23 1316   98 6 °F (37 °C) (!) 110 18 155/78 98 %      Temp Source Heart Rate Source Patient Position - Orthostatic VS BP Location FiO2 (%)   06/25/23 0535 06/24/23 1334 06/24/23 1316 06/24/23 1316 --   Oral Monitor Sitting Left arm       Pain Score       06/24/23 1316       No Pain          Wt Readings from Last 1 Encounters:   06/25/23 (!) 144 kg (317 lb 7 4 oz)     Additional Vital Signs:   Date/Time Temp Pulse Resp BP MAP (mmHg) SpO2 O2 Device Patient Position - Orthostatic VS   06/25/23 1121 98 1 °F (36 7 °C) 111 Abnormal  16 112/68 85 95 % None (Room "air) Sitting   06/25/23 0800 98 °F (36 7 °C) 104 16 143/80 103 94 % None (Room air) Lying   06/25/23 0545 -- 109 Abnormal  20 118/72 86 96 % -- Lying   06/25/23 0535 97 8 °F (36 6 °C) -- -- -- -- -- -- --   06/25/23 0525 -- 80 16 133/70 -- -- -- --   06/25/23 0200 -- 94 18 132/71 96 97 % None (Room air) Sitting   06/24/23 2230 -- 98 16 120/73 92 94 % None (Room air) Sitting   06/24/23 2030 -- 108 Abnormal  18 133/78 98 91 % None (Room air) Sitting   06/24/23 1930 -- 113 Abnormal  22 113/74 89 94 % None (Room air) Sitting   06/24/23 1837 -- 101 20 135/74 96 97 % None (Room air) Sitting   06/24/23 1530 -- 105 20 167/73 99 96 % None (Room air) Sitting   06/24/23 1335 -- -- -- -- -- -- None (Room air) --   06/24/23 1334 -- 121 Abnormal  20 148/77 105 95 % None (Room air) Lying   06/24/23 1316 98 6 °F (37 °C) 110 Abnormal  18 155/78 -- 98 % None (Room air) Sitting     Weights (last 14 days)    Date/Time Weight Weight Method Height   06/25/23 0535 144 kg (317 lb 7 4 oz) Abnormal  Standing scale 5' 11\" (1 803 m)   06/24/23 1439 -- -- 5' 11\" (1 803 m)   06/24/23 1316 149 kg (328 lb) Abnormal  Standing scale --       Pertinent Labs/Diagnostic Test Results:   6/24 ecg read by ED MD  Rhythm: sinus tachycardia    Ectopy:     Ectopy: none    QRS:     QRS axis:  Normal    QRS intervals:  Normal  Conduction:     Conduction: normal    ST segments:     ST segments:  Normal  T waves:     T waves: peaked      Peaked:  V3, V4 and V5  XR chest portable   ED Interpretation by Chucky Huerta DO (06/24 1501)   No acute abnl      Final Result by Nomi Negron MD (06/24 1922)      No acute cardiopulmonary disease  Results from last 7 days   Lab Units 06/25/23  0525 06/24/23  1351   WBC Thousand/uL 10 97* 11 59*   HEMOGLOBIN g/dL 15 3 15 9   HEMATOCRIT % 45 9 47 7   PLATELETS Thousands/uL 130* 152   NEUTROS ABS Thousands/µL 7 00 7  85*         Results from last 7 days   Lab Units 06/25/23  0525 06/25/23  0005 06/24/23 1948 " 06/24/23  1650 06/24/23  1351   SODIUM mmol/L 131* 130* 128* 127* 122*   POTASSIUM mmol/L 4 0 4 1 4 0 4 6 4 5   CHLORIDE mmol/L 97 96 93* 92* 87*   CO2 mmol/L 26 28 26 26 24   ANION GAP mmol/L 8 6 9 9 11   BUN mg/dL 14 16 17 18 21   CREATININE mg/dL 0 79 0 86 1 09 0 99 1 02   EGFR ml/min/1 73sq m 98 94 73 83 80   CALCIUM mg/dL 8 8 9 1 9 5 9 7 9 6     Results from last 7 days   Lab Units 06/25/23  0525 06/24/23  1351   AST U/L 13 13   ALT U/L 17 21   ALK PHOS U/L 76 96   TOTAL PROTEIN g/dL 6 6 7 4   ALBUMIN g/dL 3 6 3 9   TOTAL BILIRUBIN mg/dL 0 46 0 44     Results from last 7 days   Lab Units 06/25/23  1153 06/25/23  0951 06/25/23  0756 06/25/23  0645 06/25/23  0511 06/25/23  0407 06/25/23  0307 06/25/23  0203 06/25/23  0107 06/25/23  0003 06/24/23  2259 06/24/23  2159   POC GLUCOSE mg/dl 328* 312* 165* 169* 202* 149* 140 144* 151* 181* 287* 333*     Results from last 7 days   Lab Units 06/25/23  0525 06/25/23  0005 06/24/23  1948 06/24/23  1650 06/24/23  1351   GLUCOSE RANDOM mg/dL 187* 174* 481* 536* 747*         Results from last 7 days   Lab Units 06/24/23  1650   HEMOGLOBIN A1C % 11 2*   EAG mg/dl 275     BETA-HYDROXYBUTYRATE   Date Value Ref Range Status   06/24/2023 0 7 (H) <0 6 mmol/L Final                              Results from last 7 days   Lab Units 06/24/23  1351   PROTIME seconds 13 1   INR  0 93   PTT seconds 24         Results from last 7 days   Lab Units 06/24/23  1351   PROCALCITONIN ng/ml 0 05     Results from last 7 days   Lab Units 06/24/23  1650 06/24/23  1351   LACTIC ACID mmol/L 1 6 2 7*             Results from last 7 days   Lab Units 06/24/23  1351   BNP pg/mL 13                                     Results from last 7 days   Lab Units 06/24/23  1335   CLARITY UA  Clear   COLOR UA  Light Yellow   SPEC GRAV UA  <1 005*   PH UA  5 0   GLUCOSE UA mg/dl 1000 (1%)*   KETONES UA mg/dl 10 (1+)*   BLOOD UA  Negative   PROTEIN UA mg/dl Trace*   NITRITE UA  Negative   BILIRUBIN UA  Negative UROBILINOGEN UA (BE) mg/dl <2 0   LEUKOCYTES UA  Negative   WBC UA /hpf 0-1   RBC UA /hpf 0-1   BACTERIA UA /hpf None Seen   EPITHELIAL CELLS WET PREP /hpf None Seen                                 Results from last 7 days   Lab Units 06/24/23  1351   BLOOD CULTURE  Received in Microbiology Lab  Culture in Progress  Received in Microbiology Lab  Culture in Progress                     ED Treatment:   Medication Administration from 06/24/2023 1307 to 06/25/2023 0533       Date/Time Order Dose Route Action     06/24/2023 2220 EDT sodium chloride (PF) 0 9 % injection 3 mL 3 mL Intravenous Given     06/24/2023 1354 EDT sodium chloride 0 9 % bolus 1,000 mL 1,000 mL Intravenous New Bag     06/24/2023 1521 EDT insulin regular (HumuLIN R,NovoLIN R) injection 10 Units 10 Units Intravenous Given     06/24/2023 1830 EDT budesonide-formoterol (SYMBICORT) 160-4 5 mcg/act inhaler 2 puff 2 puff Inhalation Given     06/24/2023 1828 EDT potassium chloride (K-DUR,KLOR-CON) CR tablet 10 mEq 10 mEq Oral Given     06/24/2023 1829 EDT rOPINIRole (REQUIP) tablet 2 mg 2 mg Oral Given     06/24/2023 2110 EDT enoxaparin (LOVENOX) subcutaneous injection 40 mg 40 mg Subcutaneous Given     06/24/2023 1603 EDT cefTRIAXone (ROCEPHIN) IVPB (premix in dextrose) 2,000 mg 50 mL 2,000 mg Intravenous New Bag     06/25/2023 0514 EDT insulin regular (HumuLIN R,NovoLIN R) 1 Units/mL in sodium chloride 0 9 % 100 mL infusion 6 Units/hr Intravenous New Bag     06/25/2023 0108 EDT insulin regular (HumuLIN R,NovoLIN R) 1 Units/mL in sodium chloride 0 9 % 100 mL infusion 4 Units/hr Intravenous New Bag     06/25/2023 0004 EDT insulin regular (HumuLIN R,NovoLIN R) 1 Units/mL in sodium chloride 0 9 % 100 mL infusion 6 Units/hr Intravenous New Bag     06/24/2023 2301 EDT insulin regular (HumuLIN R,NovoLIN R) 1 Units/mL in sodium chloride 0 9 % 100 mL infusion 10 Units/hr Intravenous New Bag     06/24/2023 4555 EDT insulin regular (HumuLIN R,NovoLIN R) 1 Units/mL in sodium chloride 0 9 % 100 mL infusion 9 Units/hr Intravenous New Bag     06/24/2023 2108 EDT insulin regular (HumuLIN R,NovoLIN R) 1 Units/mL in sodium chloride 0 9 % 100 mL infusion 8 Units/hr Intravenous New Bag     06/24/2023 2015 EDT insulin regular (HumuLIN R,NovoLIN R) 1 Units/mL in sodium chloride 0 9 % 100 mL infusion 12 Units/hr Intravenous Rate/Dose Change     06/24/2023 1821 EDT insulin regular (HumuLIN R,NovoLIN R) 1 Units/mL in sodium chloride 0 9 % 100 mL infusion 10 Units/hr Intravenous New Bag     06/24/2023 1833 EDT sodium chloride 0 9 % infusion 100 mL/hr Intravenous New Bag        Past Medical History:   Diagnosis Date   • Asthma    • Chronic pain    • Diabetes mellitus ([facility] )    • Gout    • Hyperlipidemia    • Hypertension    • Impaired fasting glucose     last assessed: 12/16/2015   • Knee arthropathy     last assessed: 3/23/2016     Present on Admission:  • Hyperglycemia due to type 2 diabetes mellitus ([facility] )  • Hyperlipidemia  • Hypertension  • Severe persistent asthma without complication      Admitting Diagnosis: Urinary frequency [R35 0]  UTI (urinary tract infection) [N39 0]  Hyperglycemia [R73 9]  Age/Sex: 60 y o  male  Admission Orders:  Continuous cardiopulmonary & pulse oximetry  freq neuro checks  I/O  Bladder scan  SCD    Scheduled Medications:  allopurinol, 200 mg, Oral, Daily  atorvastatin, 20 mg, Oral, Daily  budesonide-formoterol, 2 puff, Inhalation, BID  enoxaparin, 40 mg, Subcutaneous, Q12H  insulin glargine, 40 Units, Subcutaneous, Q12H MOOK  lisinopril, 40 mg, Oral, Daily  magnesium gluconate, 500 mg, Oral, Daily  potassium chloride, 10 mEq, Oral, BID  rOPINIRole, 2 mg, Oral, BID  sodium chloride (PF), 3 mL, Intravenous, Q8H [address]      Continuous IV Infusions:  insulin regular (HumuLIN R,NovoLIN R) 1 Units/mL in sodium chloride 0 9 % 100 mL infusion, 0 3-21 Units/hr, Intravenous, Titrated      PRN Meds:  acetaminophen, 650 mg, Oral, Q6H PRN  albuterol, 2 puff, Inhalation, Q4H PRN        IP CONSULT TO ENDOCRINOLOGY    Network Utilization Review Department  ATTENTION: Please call with any questions or concerns to [phone] and carefully listen to the prompts so that you are directed to the right person  All voicemails are confidential   Lizette Delaney all requests for admission clinical reviews, approved or denied determinations and any other requests to dedicated fax number below belonging to the campus where the patient is receiving treatment   List of dedicated fax numbers for the Facilities:  [address] DENIALS (Administrative/Medical Necessity) [phone]   [address] (Maternity/NICU/Pediatrics) [phone]   [address] Ina Lopez [phone]   [place] [address] [phone]   [address] [place] [address] [facility] [phone]   1553 [facility] Ryne Martinez [facility] 134 815 [facility] [phone]

## 2023-06-26 ENCOUNTER — TRANSITIONAL CARE MANAGEMENT (OUTPATIENT)
Dept: FAMILY MEDICINE CLINIC | Facility: CLINIC | Age: 58
End: 2023-06-26

## 2023-06-26 VITALS
HEART RATE: 97 BPM | WEIGHT: 315 LBS | SYSTOLIC BLOOD PRESSURE: 118 MMHG | BODY MASS INDEX: 44.1 KG/M2 | TEMPERATURE: 98.2 F | HEIGHT: 71 IN | DIASTOLIC BLOOD PRESSURE: 73 MMHG | RESPIRATION RATE: 22 BRPM | OXYGEN SATURATION: 95 %

## 2023-06-26 LAB
ANION GAP SERPL CALCULATED.3IONS-SCNC: 7 MMOL/L
BASOPHILS # BLD AUTO: 0.05 THOUSANDS/ÂΜL (ref 0–0.1)
BASOPHILS NFR BLD AUTO: 1 % (ref 0–1)
BUN SERPL-MCNC: 10 MG/DL (ref 5–25)
CALCIUM SERPL-MCNC: 8.6 MG/DL (ref 8.4–10.2)
CHLORIDE SERPL-SCNC: 97 MMOL/L (ref 96–108)
CO2 SERPL-SCNC: 27 MMOL/L (ref 21–32)
CREAT SERPL-MCNC: 0.69 MG/DL (ref 0.6–1.3)
EOSINOPHIL # BLD AUTO: 0.25 THOUSAND/ÂΜL (ref 0–0.61)
EOSINOPHIL NFR BLD AUTO: 3 % (ref 0–6)
ERYTHROCYTE [DISTWIDTH] IN BLOOD BY AUTOMATED COUNT: 12.6 % (ref 11.6–15.1)
GFR SERPL CREATININE-BSD FRML MDRD: 103 ML/MIN/1.73SQ M
GLUCOSE SERPL-MCNC: 172 MG/DL (ref 65–140)
GLUCOSE SERPL-MCNC: 219 MG/DL (ref 65–140)
GLUCOSE SERPL-MCNC: 271 MG/DL (ref 65–140)
GLUCOSE SERPL-MCNC: 274 MG/DL (ref 65–140)
HCT VFR BLD AUTO: 45.5 % (ref 36.5–49.3)
HGB BLD-MCNC: 15.1 G/DL (ref 12–17)
IMM GRANULOCYTES # BLD AUTO: 0.09 THOUSAND/UL (ref 0–0.2)
IMM GRANULOCYTES NFR BLD AUTO: 1 % (ref 0–2)
LYMPHOCYTES # BLD AUTO: 1.95 THOUSANDS/ÂΜL (ref 0.6–4.47)
LYMPHOCYTES NFR BLD AUTO: 21 % (ref 14–44)
MCH RBC QN AUTO: 28.8 PG (ref 26.8–34.3)
MCHC RBC AUTO-ENTMCNC: 33.2 G/DL (ref 31.4–37.4)
MCV RBC AUTO: 87 FL (ref 82–98)
MONOCYTES # BLD AUTO: 0.91 THOUSAND/ÂΜL (ref 0.17–1.22)
MONOCYTES NFR BLD AUTO: 10 % (ref 4–12)
NEUTROPHILS # BLD AUTO: 6.05 THOUSANDS/ÂΜL (ref 1.85–7.62)
NEUTS SEG NFR BLD AUTO: 64 % (ref 43–75)
NRBC BLD AUTO-RTO: 0 /100 WBCS
PLATELET # BLD AUTO: 146 THOUSANDS/UL (ref 149–390)
PMV BLD AUTO: 9.4 FL (ref 8.9–12.7)
POTASSIUM SERPL-SCNC: 4.3 MMOL/L (ref 3.5–5.3)
RBC # BLD AUTO: 5.25 MILLION/UL (ref 3.88–5.62)
SODIUM SERPL-SCNC: 131 MMOL/L (ref 135–147)
WBC # BLD AUTO: 9.3 THOUSAND/UL (ref 4.31–10.16)

## 2023-06-26 PROCEDURE — 85025 COMPLETE CBC W/AUTO DIFF WBC: CPT

## 2023-06-26 PROCEDURE — 99223 1ST HOSP IP/OBS HIGH 75: CPT | Performed by: STUDENT IN AN ORGANIZED HEALTH CARE EDUCATION/TRAINING PROGRAM

## 2023-06-26 PROCEDURE — 80048 BASIC METABOLIC PNL TOTAL CA: CPT

## 2023-06-26 PROCEDURE — 82948 REAGENT STRIP/BLOOD GLUCOSE: CPT

## 2023-06-26 PROCEDURE — 99239 HOSP IP/OBS DSCHRG MGMT >30: CPT | Performed by: INTERNAL MEDICINE

## 2023-06-26 RX ORDER — BLOOD SUGAR DIAGNOSTIC
STRIP MISCELLANEOUS
Qty: 100 EACH | Refills: 0 | Status: SHIPPED | OUTPATIENT
Start: 2023-06-26 | End: 2023-07-17

## 2023-06-26 RX ORDER — INSULIN LISPRO 100 [IU]/ML
26 INJECTION, SOLUTION INTRAVENOUS; SUBCUTANEOUS
Qty: 30 ML | Refills: 0 | Status: SHIPPED | OUTPATIENT
Start: 2023-06-26 | End: 2023-06-26 | Stop reason: SDUPTHER

## 2023-06-26 RX ORDER — INSULIN GLARGINE 100 [IU]/ML
40 INJECTION, SOLUTION SUBCUTANEOUS 2 TIMES DAILY
Qty: 30 ML | Refills: 0 | Status: SHIPPED | OUTPATIENT
Start: 2023-06-26 | End: 2023-06-26 | Stop reason: SDUPTHER

## 2023-06-26 RX ORDER — PEN NEEDLE, DIABETIC 32GX 5/32"
NEEDLE, DISPOSABLE MISCELLANEOUS
Qty: 100 EACH | Refills: 0 | Status: SHIPPED | OUTPATIENT
Start: 2023-06-26

## 2023-06-26 RX ORDER — GLUCOSAMINE HCL/CHONDROITIN SU 500-400 MG
CAPSULE ORAL
Qty: 100 EACH | Refills: 0 | Status: SHIPPED | OUTPATIENT
Start: 2023-06-26

## 2023-06-26 RX ORDER — INSULIN LISPRO 100 [IU]/ML
26 INJECTION, SOLUTION INTRAVENOUS; SUBCUTANEOUS
Qty: 30 ML | Refills: 0 | Status: SHIPPED | OUTPATIENT
Start: 2023-06-26

## 2023-06-26 RX ORDER — PEN NEEDLE, DIABETIC 32GX 5/32"
NEEDLE, DISPOSABLE MISCELLANEOUS
Qty: 100 EACH | Refills: 0 | Status: SHIPPED | OUTPATIENT
Start: 2023-06-26 | End: 2023-06-26 | Stop reason: SDUPTHER

## 2023-06-26 RX ORDER — LANCETS 33 GAUGE
EACH MISCELLANEOUS
Qty: 100 EACH | Refills: 0 | Status: SHIPPED | OUTPATIENT
Start: 2023-06-26 | End: 2023-07-17

## 2023-06-26 RX ORDER — INSULIN LISPRO 100 [IU]/ML
26 INJECTION, SOLUTION INTRAVENOUS; SUBCUTANEOUS
Qty: 30 ML | Refills: 0 | Status: SHIPPED | OUTPATIENT
Start: 2023-06-26 | End: 2023-06-26

## 2023-06-26 RX ORDER — INSULIN GLARGINE 100 [IU]/ML
40 INJECTION, SOLUTION SUBCUTANEOUS 2 TIMES DAILY
Qty: 30 ML | Refills: 0 | Status: SHIPPED | OUTPATIENT
Start: 2023-06-26

## 2023-06-26 RX ORDER — BLOOD-GLUCOSE METER
KIT MISCELLANEOUS
Qty: 1 KIT | Refills: 0 | Status: SHIPPED | OUTPATIENT
Start: 2023-06-26

## 2023-06-26 RX ORDER — INSULIN GLARGINE 100 [IU]/ML
40 INJECTION, SOLUTION SUBCUTANEOUS 2 TIMES DAILY
Qty: 30 ML | Refills: 0 | Status: SHIPPED | OUTPATIENT
Start: 2023-06-26 | End: 2023-06-26

## 2023-06-26 RX ADMIN — ALLOPURINOL 200 MG: 100 TABLET ORAL at 08:32

## 2023-06-26 RX ADMIN — INSULIN LISPRO 2 UNITS: 100 INJECTION, SOLUTION INTRAVENOUS; SUBCUTANEOUS at 16:24

## 2023-06-26 RX ADMIN — LISINOPRIL 40 MG: 20 TABLET ORAL at 08:32

## 2023-06-26 RX ADMIN — INSULIN LISPRO 6 UNITS: 100 INJECTION, SOLUTION INTRAVENOUS; SUBCUTANEOUS at 07:24

## 2023-06-26 RX ADMIN — INSULIN LISPRO 26 UNITS: 100 INJECTION, SOLUTION INTRAVENOUS; SUBCUTANEOUS at 12:14

## 2023-06-26 RX ADMIN — INSULIN LISPRO 26 UNITS: 100 INJECTION, SOLUTION INTRAVENOUS; SUBCUTANEOUS at 16:24

## 2023-06-26 RX ADMIN — INSULIN LISPRO 26 UNITS: 100 INJECTION, SOLUTION INTRAVENOUS; SUBCUTANEOUS at 07:25

## 2023-06-26 RX ADMIN — ALBUTEROL SULFATE 2 PUFF: 90 AEROSOL, METERED RESPIRATORY (INHALATION) at 08:33

## 2023-06-26 RX ADMIN — INSULIN LISPRO 6 UNITS: 100 INJECTION, SOLUTION INTRAVENOUS; SUBCUTANEOUS at 12:14

## 2023-06-26 RX ADMIN — ENOXAPARIN SODIUM 40 MG: 100 INJECTION SUBCUTANEOUS at 08:34

## 2023-06-26 RX ADMIN — ATORVASTATIN CALCIUM 20 MG: 20 TABLET, FILM COATED ORAL at 08:33

## 2023-06-26 RX ADMIN — ROPINIROLE 2 MG: 2 TABLET, FILM COATED ORAL at 08:33

## 2023-06-26 RX ADMIN — BUDESONIDE AND FORMOTEROL FUMARATE DIHYDRATE 2 PUFF: 160; 4.5 AEROSOL RESPIRATORY (INHALATION) at 08:33

## 2023-06-26 RX ADMIN — INSULIN GLARGINE 40 UNITS: 100 INJECTION, SOLUTION SUBCUTANEOUS at 08:34

## 2023-06-26 RX ADMIN — POTASSIUM CHLORIDE 10 MEQ: 750 TABLET, EXTENDED RELEASE ORAL at 08:33

## 2023-06-26 RX ADMIN — Medication 500 MG: at 08:35

## 2023-06-26 NOTE — PLAN OF CARE
Problem: MOBILITY - ADULT  Goal: Maintain or return to baseline ADL function  Description: INTERVENTIONS:  -  Assess patient's ability to carry out ADLs; assess patient's baseline for ADL function and identify physical deficits which impact ability to perform ADLs (bathing, care of mouth/teeth, toileting, grooming, dressing, etc )  - Assess/evaluate cause of self-care deficits   - Assess range of motion  - Assess patient's mobility; develop plan if impaired  - Assess patient's need for assistive devices and provide as appropriate  - Encourage maximum independence but intervene and supervise when necessary  - Involve family in performance of ADLs  - Assess for home care needs following discharge   - Consider OT consult to assist with ADL evaluation and planning for discharge  - Provide patient education as appropriate  Outcome: Progressing  Goal: Maintains/Returns to pre admission functional level  Description: INTERVENTIONS:  - Perform BMAT or MOVE assessment daily    - Set and communicate daily mobility goal to care team and patient/family/caregiver  - Collaborate with rehabilitation services on mobility goals if consulted  - Perform Range of Motion 3 times a day  - Reposition patient every 2 hours    - Dangle patient 3 times a day  - Stand patient 3 times a day  - Ambulate patient 3 times a day  - Out of bed to chair 3 times a day   - Out of bed for meals 3 times a day  - Out of bed for toileting  - Record patient progress and toleration of activity level   Outcome: Progressing     Problem: PAIN - ADULT  Goal: Verbalizes/displays adequate comfort level or baseline comfort level  Description: Interventions:  - Encourage patient to monitor pain and request assistance  - Assess pain using appropriate pain scale  - Administer analgesics based on type and severity of pain and evaluate response  - Implement non-pharmacological measures as appropriate and evaluate response  - Consider cultural and social influences on pain and pain management  - Notify physician/advanced practitioner if interventions unsuccessful or patient reports new pain  Outcome: Progressing     Problem: INFECTION - ADULT  Goal: Absence or prevention of progression during hospitalization  Description: INTERVENTIONS:  - Assess and monitor for signs and symptoms of infection  - Monitor lab/diagnostic results  - Monitor all insertion sites, i e  indwelling lines, tubes, and drains  - Monitor endotracheal if appropriate and nasal secretions for changes in amount and color  - [place] appropriate cooling/warming therapies per order  - Administer medications as ordered  - Instruct and encourage patient and family to use good hand hygiene technique  - Identify and instruct in appropriate isolation precautions for identified infection/condition  Outcome: Progressing  Goal: Absence of fever/infection during neutropenic period  Description: INTERVENTIONS:  - Monitor WBC    Outcome: Progressing     Problem: SAFETY ADULT  Goal: Maintain or return to baseline ADL function  Description: INTERVENTIONS:  -  Assess patient's ability to carry out ADLs; assess patient's baseline for ADL function and identify physical deficits which impact ability to perform ADLs (bathing, care of mouth/teeth, toileting, grooming, dressing, etc )  - Assess/evaluate cause of self-care deficits   - Assess range of motion  - Assess patient's mobility; develop plan if impaired  - Assess patient's need for assistive devices and provide as appropriate  - Encourage maximum independence but intervene and supervise when necessary  - Involve family in performance of ADLs  - Assess for home care needs following discharge   - Consider OT consult to assist with ADL evaluation and planning for discharge  - Provide patient education as appropriate  Outcome: Progressing  Goal: Maintains/Returns to pre admission functional level  Description: INTERVENTIONS:  - Perform BMAT or MOVE assessment daily    - Set and communicate daily mobility goal to care team and patient/family/caregiver  - Collaborate with rehabilitation services on mobility goals if consulted  - Perform Range of Motion 3 times a day  - Reposition patient every 2 hours    - Dangle patient 3 times a day  - Stand patient 3 times a day  - Ambulate patient 3 times a day  - Out of bed to chair 3 times a day   - Out of bed for meals 3 times a day  - Out of bed for toileting  - Record patient progress and toleration of activity level   Outcome: Progressing  Goal: Patient will remain free of falls  Description: INTERVENTIONS:  - Educate patient/family on patient safety including physical limitations  - Instruct patient to call for assistance with activity   - Consult OT/PT to assist with strengthening/mobility   - Keep Call bell within reach  - Keep bed low and locked with side rails adjusted as appropriate  - Keep care items and personal belongings within reach  - Initiate and maintain comfort rounds  - Make Fall Risk Sign visible to staff  - Offer Toileting every 2 Hours, in advance of need  - Initiate/Maintain bed alarm  - Obtain necessary fall risk management equipment:   - Apply yellow socks and bracelet for high fall risk patients  - Consider moving patient to room near nurses station  Outcome: Progressing     Problem: DISCHARGE PLANNING  Goal: Discharge to home or other facility with appropriate resources  Description: INTERVENTIONS:  - Identify barriers to discharge w/patient and caregiver  - Arrange for needed discharge resources and transportation as appropriate  - Identify discharge learning needs (meds, wound care, etc )  - Arrange for interpretive services to assist at discharge as needed  - Refer to Case Management Department for coordinating discharge planning if the patient needs post-hospital services based on physician/advanced practitioner order or complex needs related to functional status, cognitive ability, or social support system  Outcome: Progressing     Problem: Knowledge Deficit  Goal: Patient/family/caregiver demonstrates understanding of disease process, treatment plan, medications, and discharge instructions  Description: Complete learning assessment and assess knowledge base  Interventions:  - Provide teaching at level of understanding  - Provide teaching via preferred learning methods  Outcome: Progressing     Problem: Nutrition/Hydration-ADULT  Goal: Nutrient/Hydration intake appropriate for improving, restoring or maintaining nutritional needs  Description: Monitor and assess patient's nutrition/hydration status for malnutrition  Collaborate with interdisciplinary team and initiate plan and interventions as ordered  Monitor patient's weight and dietary intake as ordered or per policy  Utilize nutrition screening tool and intervene as necessary  Determine patient's food preferences and provide high-protein, high-caloric foods as appropriate       INTERVENTIONS:  - Monitor oral intake, urinary output, labs, and treatment plans  - Assess nutrition and hydration status and recommend course of action  - Evaluate amount of meals eaten  - Assist patient with eating if necessary   - Allow adequate time for meals  - Recommend/ encourage appropriate diets, oral nutritional supplements, and vitamin/mineral supplements  - Order, calculate, and assess calorie counts as needed  - Recommend, monitor, and adjust tube feedings and TPN/PPN based on assessed needs  - Assess need for intravenous fluids  - Provide specific nutrition/hydration education as appropriate  - Include patient/family/caregiver in decisions related to nutrition  Outcome: Progressing

## 2023-06-26 NOTE — CASE MANAGEMENT
Case Management Progress Note    Patient name Will Fernandes  Location  SDU/-01 S* MRN [id number]  : 1965 Date 2023       LOS (days): 2  Geometric Mean LOS (GMLOS) (days):   Days to GMLOS:        OBJECTIVE:        Current admission status: Inpatient  Preferred Pharmacy:   [address] [place] [address], [address] [address]  Phone: [phone] Fax: [phone] [facility] , [facility] [place] [address] [facility] [address] [address] [facility] [address] [address]  Phone: [phone] Fax: [phone]    Primary Care Provider: Marcy Goyal MD    Primary Insurance: [facility]  Secondary Insurance:     PROGRESS NOTE:  Call placed to [facility] re: cost of Basaglar kwik pen and insulin lispro  Pharmacist informed CM that copay costs are $35 each but will not have it in stock until tomorrow afternoon  [facility] pharmacist informed CM that all the [facility] will have to order it the insulin as it is not in stock  Scripts sent to [facility] in [facility] does not have contract with Pt's insurance  [facility] sent scripts to [address] [place] [address]  CM spoke with Iris Gleason at [address] [place] [address], insurance covers Humalog kwik pen and lantus under their pharmacy and it is in stock  CM informed Pt of status with pharmacies and Pt is agreeable to drive up to [facility] to  insulin  CM informed Iris Gleason at [address] [place] [address] that Pt will be driving to [facility] to pickup insulin

## 2023-06-26 NOTE — ASSESSMENT & PLAN NOTE
· Severe asthma recently required a prednisone taper  · No evidence of exacerbation this admission  · Continue Symbicort as previously taken

## 2023-06-26 NOTE — PLAN OF CARE
Problem: MOBILITY - ADULT  Goal: Maintain or return to baseline ADL function  Description: INTERVENTIONS:  -  Assess patient's ability to carry out ADLs; assess patient's baseline for ADL function and identify physical deficits which impact ability to perform ADLs (bathing, care of mouth/teeth, toileting, grooming, dressing, etc )  - Assess/evaluate cause of self-care deficits   - Assess range of motion  - Assess patient's mobility; develop plan if impaired  - Assess patient's need for assistive devices and provide as appropriate  - Encourage maximum independence but intervene and supervise when necessary  - Involve family in performance of ADLs  - Assess for home care needs following discharge   - Consider OT consult to assist with ADL evaluation and planning for discharge  - Provide patient education as appropriate  Outcome: Progressing  Goal: Maintains/Returns to pre admission functional level  Description: INTERVENTIONS:  - Perform BMAT or MOVE assessment daily    - Set and communicate daily mobility goal to care team and patient/family/caregiver  - Collaborate with rehabilitation services on mobility goals if consulted  - Perform Range of Motion 4  times a day  - Reposition patient every 4 hours    - Dangle patient 2 times a day  - Stand patient 2 times a day  - Ambulate patient  2 times a day  - Out of bed to chair 2 times a day   - Out of bed for meals 2 times a day  - Out of bed for toileting  - Record patient progress and toleration of activity level   Outcome: Progressing

## 2023-06-26 NOTE — CONSULTS
Consultation - Mini Diaz 60 y o  male MRN: [id number]    Unit/Bed#: -01 SDU Encounter: [id number]      Assessment/Plan     Assessment: This is a 60y o -year-old male with poorly controlled T2 diabetes with hyperglycemia d/t UTI and also d/t steroid use who presents to ED for urinary issues, found to have severe hyperglycemia wo DKA/HHNS  HbA1C 11 2% on admission  Discussed with patient that HbA1C is a 3 months avg BG and since on steroids and having UTI only recently, do not believe raise in A1C only d/t these reasons  Initially on insulin drip with high drip requirements, transitioned to basal/bolus yesterday  Given transitioned to MDI <24hr ago, recommend continuing on current regime and adjust insulin dose tomorrow if needed  Given elevated HbA1C, most likely will need to be discharged on MDI insulin- please start insulin teaching now  Given such high insulin requirements, will also order salivary cortisol at night to make sure doesn't have any hypercortisolism  Offered Endocrine follow up as OP but patient rather follow up with PCP  Please make sure patient has glucometer, check BG 2-3x daily and follow up with PCP in 2 weeks of discharge for BG management  Insulin dose TBD in next 24hrs  Plan:  - Lantus 40u BID  - Novolog 26u with each meal + ISS  - Goal -180  - Salivary cortisol     Inpatient consult to Endocrinology  Consult performed by: Ana Lilia Boo MD  Consult ordered by: Campos Henry PA-C          CC:T2DM management     History of Present Illness     HPI: Mini Diaz is a 60y o  year old male with type 2 DM since 14 years On Trulicty and Glipizide at home with complication of mild neuropathy who presents with inability to urinate for 2 weeks with positive urinalysis for UTI along with recent use of prednisone for rash in arms 2 weeks ago leading to severe hyperglycemia into >700 without DKA or HHNS on admission   Given severe hyperglycemia, patient was started on insulin drip and transitioned to Lantus 40u BID and Novolog 26u with meals yesterday  Endocrine consulted for diabetes management  HbA1C on admission 11 2%    Patient reports Dx with DM:- 15 years  Checks BG at home- 2-3x weekly, reports -300 range  However reports last HbA1C was 6% with PCP, so unsure why HbA1C this high now  Believes it the steroids  Home regime- Trulicity 0 4MB weekly, Glipizide 5mg BID  BG since transitioned to basal/bolus:- Fasting BG this AM still at 219- only received 40u Lantus last night, BG this , 274  Patient currently feels well  Reports having polyuria, polydipsia on admission d/t his UTI which since has resolved  Reports no weight changes, no blurry vision- other than being near sighted  Is eating well with no fever, chills, nausea, vomiting    Review of Systems   Constitutional: Negative for diaphoresis, fatigue and unexpected weight change  Respiratory: Negative for shortness of breath  Cardiovascular: Negative for chest pain and palpitations  Gastrointestinal: Negative for constipation and diarrhea  Endocrine: Negative for polydipsia and polyuria         Historical Information   Past Medical History:   Diagnosis Date   • Asthma    • Chronic pain    • Diabetes mellitus ([facility] [address] )    • Gout    • Hyperlipidemia    • Hypertension    • Impaired fasting glucose     last assessed: 12/16/2015   • Knee arthropathy     last assessed: 3/23/2016     Past Surgical History:   Procedure Laterality Date   • KNEE ARTHROSCOPY Bilateral    • KNEE SURGERY       Social History   Social History     Substance and Sexual Activity   Alcohol Use Yes    Comment: 2 light beers daily     Social History     Substance and Sexual Activity   Drug Use Never     Social History     Tobacco Use   Smoking Status Never   Smokeless Tobacco Never     Family History:   Family History   Problem Relation Age of Onset   • Coronary artery disease Father    • Cancer Other         malignant neoplasm   • Asthma Brother    • No Known Problems Daughter    • Asthma Mother    • COPD Mother    • Asthma Sister    • No Known Problems Brother    • Asthma Son    • No Known Problems Son        Meds/Allergies   Current Facility-Administered Medications   Medication Dose Route Frequency Provider Last Rate Last Admin   • acetaminophen (TYLENOL) tablet 650 mg  650 mg Oral Q6H PRN May Saida, DO       • albuterol (PROVENTIL HFA,VENTOLIN HFA) inhaler 2 puff  2 puff Inhalation Q4H PRN Marily Medinai, DO   2 puff at 06/26/23 0833   • allopurinol (ZYLOPRIM) tablet 200 mg  200 mg Oral Daily Marily Canela, DO   200 mg at 06/26/23 8953   • atorvastatin (LIPITOR) tablet 20 mg  20 mg Oral Daily Marilylorraine Medinai, DO   20 mg at 06/26/23 3175   • budesonide-formoterol (SYMBICORT) 160-4 5 mcg/act inhaler 2 puff  2 puff Inhalation BID Marily Medinai, DO   2 puff at 06/26/23 2179   • enoxaparin (LOVENOX) subcutaneous injection 40 mg  40 mg Subcutaneous Q12H Marily Canela, DO   40 mg at 06/26/23 0834   • insulin glargine (LANTUS) subcutaneous injection 40 Units 0 4 mL  40 Units Subcutaneous Q12H [address] [place], [place]   40 Units at 06/26/23 3418   • insulin lispro (HumaLOG) 100 units/mL subcutaneous injection 1-6 Units  1-6 Units Subcutaneous HS [facility] FELIPE Perry   3 Units at 06/25/23 2111   • insulin lispro (HumaLOG) 100 units/mL subcutaneous injection 2-12 Units  2[facility]   6 Units at 06/26/23 1214   • insulin lispro (HumaLOG) 100 units/mL subcutaneous injection 26 Units  26 Units Subcutaneous TID With Meals [facility] FELIPE Perry   26 Units at 06/26/23 1214   • lisinopril (ZESTRIL) tablet 40 mg  40 mg Oral Daily Marily Medinai, DO   40 mg at 06/26/23 4175   • magnesium gluconate (MAGONATE) tablet 500 mg  500 mg Oral Daily Marily Canela DO   500 mg at 06/26/23 2161   • potassium chloride (K-DUR,KLOR-CON) CR tablet 10 mEq  10 mEq Oral BID "Tayler Shara, DO   10 mEq at 06/26/23 9313   • rOPINIRole (REQUIP) tablet 2 mg  2 mg Oral BID Marily Canela, DO   2 mg at 06/26/23 6712   • sodium chloride (PF) 0 9 % injection 3 mL  3 mL Intravenous Q8H [facility], DO   3 mL at 06/25/23 2112     Allergies   Allergen Reactions   • Other Cough and Nasal Congestion     Seasonal allergies        Objective   Vitals: Blood pressure 118/86, pulse (!) 106, temperature 98 °F (36 7 °C), temperature source Axillary, resp  rate 20, height 5' 11\" (1 803 m), weight (!) 143 kg (316 lb), SpO2 94 %  Intake/Output Summary (Last 24 hours) at 6/26/2023 1439  Last data filed at 6/26/2023 0800  Gross per 24 hour   Intake 711 98 ml   Output --   Net 711 98 ml     Invasive Devices     Peripheral Intravenous Line  Duration           Peripheral IV 06/24/23 Right Antecubital 2 days    Peripheral IV 06/25/23 Dorsal (posterior); Left Hand 1 day                Physical Exam  Constitutional:       Appearance: Normal appearance  He is obese  Cardiovascular:      Rate and Rhythm: Normal rate and regular rhythm  Pulses: Normal pulses  Pulmonary:      Effort: Pulmonary effort is normal    Abdominal:      General: Abdomen is flat  Palpations: Abdomen is soft  Skin:     General: Skin is warm  Capillary Refill: Capillary refill takes less than 2 seconds  Neurological:      General: No focal deficit present  Mental Status: He is alert  The history was obtained from the review of the chart, patient      Lab Results:   Results from last 7 days   Lab Units 06/24/23  1650   HEMOGLOBIN A1C % 11 2*     Lab Results   Component Value Date    WBC 9 30 06/26/2023    HGB 15 1 06/26/2023    HCT 45 5 06/26/2023    MCV 87 06/26/2023     (L) 06/26/2023     Lab Results   Component Value Date/Time    BUN 10 06/26/2023 05:45 AM    BUN 17 02/03/2023 08:13 AM     01/17/2018 06:46 AM    K 4 3 06/26/2023 05:45 AM    K 4 4 02/03/2023 08:13 AM    CL 97 06/26/2023 " "05:45 AM    CL 98 02/03/2023 08:13 AM    CO2 27 06/26/2023 05:45 AM    CO2 28 02/03/2023 08:13 AM    CREATININE 0 69 06/26/2023 05:45 AM    CREATININE 0 80 01/17/2018 06:46 AM    AST 13 06/25/2023 05:25 AM    AST 20 02/03/2023 08:13 AM    ALT 17 06/25/2023 05:25 AM    ALT 36 02/03/2023 08:13 AM    TP 6 6 06/25/2023 05:25 AM    TP 7 6 02/03/2023 08:13 AM    ALB 3 6 06/25/2023 05:25 AM    ALB 4 3 01/17/2018 06:46 AM    GLOB 3 0 02/03/2023 08:13 AM     No results for input(s): \"CHOL\", \"HDL\", \"LDL\", \"TRIG\", \"VLDL\" in the last 72 hours  No results found for: \"MICROALBUR\", \"[id number]\"  POC Glucose (mg/dl)   Date Value   06/26/2023 274 (H)   06/26/2023 271 (H)       Imaging Studies: I have personally reviewed pertinent reports  Portions of the record may have been created with voice recognition software     "

## 2023-06-26 NOTE — ASSESSMENT & PLAN NOTE
Background: History of diabetes mellitus previously on insulin but now on glipizide and Trulicity who presented to the hospital with polydipsia and polyuria found to have hyperglycemia without evidence of DKA  · He did well with insulin infusion and has been transitioned to glargine 40 units and lispro 26 units 3 times daily  · Glucose relatively stable    · Prescription sent for Basaglar and Admelog based on formulary  · Needs to follow-up with endocrinology as an outpatient    Results from last 7 days   Lab Units 06/24/23  1650   HEMOGLOBIN A1C % 11 2*     Results from last 7 days   Lab Units 06/26/23  1115 06/26/23  0708 06/25/23  2024 06/25/23  1753 06/25/23  1603 06/25/23  1421 06/25/23  1153 06/25/23  0951   POC GLUCOSE mg/dl 274* 271* 235* 252* 101 204* 328* 312*

## 2023-06-26 NOTE — DISCHARGE SUMMARY
[place]  Discharge- Keron Plush 1965, 60 y o  male MRN: [id number]  Unit/Bed#: -01 SDU Encounter: [id number]  Primary Care Provider: Kimberley Dandy, MD   Date and time admitted to hospital: 6/24/2023  1:26 PM    Admitting Provider:  Kobi Vazquez MD  Discharge Provider:  Carlyn Bullock DO  Admission Date: 6/24/2023       Discharge Date: 06/26/23   LOS: 2  Primary Care Physician at Discharge: Kimberley Dandy, [phone]    DISCHARGE DIAGNOSES  * Hyperglycemia due to type 2 diabetes mellitus [facility])  Assessment & Plan  Background: History of diabetes mellitus previously on insulin but now on glipizide and Trulicity who presented to the hospital with polydipsia and polyuria found to have hyperglycemia without evidence of DKA  · He did well with insulin infusion and has been transitioned to glargine 40 units and lispro 26 units 3 times daily  · Glucose relatively stable    · Prescription sent for Basaglar and Admelog based on formulary  · Needs to follow-up with endocrinology as an outpatient    Results from last 7 days   Lab Units 06/24/23  1650   HEMOGLOBIN A1C % 11 2*     Results from last 7 days   Lab Units 06/26/23  1115 06/26/23  0708 06/25/23  2024 06/25/23  1753 06/25/23  1603 06/25/23  1421 06/25/23  1153 06/25/23  0951   POC GLUCOSE mg/dl 274* 271* 235* 252* 101 204* 328* 312*       Severe persistent asthma without complication  Assessment & Plan  · Severe asthma recently required a prednisone taper  · No evidence of exacerbation this admission  · Continue Symbicort as previously taken    Hyperlipidemia  Assessment & Plan  · Continue atorvastatin    Hypertension  Assessment & Plan  · Blood pressure stable continue lisinopril    Urinary frequency-resolved as of 6/25/2023  Assessment & Plan  · Urinary frequency as an outpatient requiring several antibiotics including nitrofurantoin, bactrim, and ciprofloxacin  · No evidence of UTI at this admission    REASON FOR ADMISSION  Urinary Frequency (Pt to ED c/o urinary frequency x 1 week  Pt states he is going about every hour  Pt does not feel like he's emptying bladder  Pt states dr Neela Reynolds tried 3 antibiotics with no Improvement  )    HOSPITAL COURSE:  Efrem Corcoran is a 60 y o  male with a history of diabetes hypertension and asthma who presented to the hospital with polyuria and polydipsia  The patient has had several antibiotics as an outpatient for dysuria  He came to the hospital where he was found to have profound hyperglycemia  He did not meet criteria for DKA  He was treated with insulin infusion has been transitioned to subcutaneous  There were difficulties on day of discharge in terms of finding the correct brand of insulin but he is being discharged with glargine 40 units twice daily and lispro 26 units 3 times daily and should follow-up with endocrinology and PCP as an outpatient  Offered to call family, patient declined         Please see problem list listed above  CONSULTING PROVIDERS   IP CONSULT TO ENDOCRINOLOGY    PROCEDURES PERFORMED  * No surgery found *    RADIOLOGY RESULTS  XR chest portable    Result Date: 6/24/2023  Impression: No acute cardiopulmonary disease   Workstation performed: [id number]       LABS  Results from last 7 days   Lab Units 06/26/23  0545 06/25/23  0525 06/24/23  1351   WBC Thousand/uL 9 30 10 97* 11 59*   HEMOGLOBIN g/dL 15 1 15 3 15 9   HEMATOCRIT % 45 5 45 9 47 7   MCV fL 87 86 86   PLATELETS Thousands/uL 146* 130* 152   INR   --   --  0 93     Results from last 7 days   Lab Units 06/26/23  0545 06/25/23  0525 06/25/23  0005 06/24/23  1948 06/24/23  1650 06/24/23  1351   SODIUM mmol/L 131* 131* 130* 128* 127* 122*   POTASSIUM mmol/L 4 3 4 0 4 1 4 0 4 6 4 5   CHLORIDE mmol/L 97 97 96 93* 92* 87*   CO2 mmol/L 27 26 28 26 26 24   BUN mg/dL 10 14 16 17 18 21   CREATININE mg/dL 0 69 0 79 0 86 1 09 0 99 1 02   CALCIUM mg/dL 8 6 8 8 9 1 9 5 9 7 9 6   ALBUMIN g/dL  --  3 6  --   -- " --  3 9   TOTAL BILIRUBIN mg/dL  --  0 46  --   --   --  0 44   ALK PHOS U/L  --  76  --   --   --  96   ALT U/L  --  17  --   --   --  21   AST U/L  --  13  --   --   --  13   EGFR ml/min/1 73sq m 103 98 94 73 83 80   GLUCOSE RANDOM mg/dL 219* 187* 174* 481* 536* 747*         Results from last 7 days   Lab Units 06/26/23  0708 06/25/23  2024 06/25/23  1753 06/25/23  1603 06/25/23  1421 06/25/23  1153 06/25/23  0951 06/25/23  0756 06/25/23  0645 06/25/23  0511   POC GLUCOSE mg/dl 271* 235* 252* 101 204* 328* 312* 165* 169* 202*     Results from last 7 days   Lab Units 06/24/23  1650   HEMOGLOBIN A1C % 11 2*         Results from last 7 days   Lab Units 06/24/23  1650 06/24/23  1351   LACTIC ACID mmol/L 1 6 2 7*   PROCALCITONIN ng/ml  --  0 05           Cultures:   Results from last 7 days   Lab Units 06/24/23  1335   COLOR UA  Light Yellow   CLARITY UA  Clear   SPEC GRAV UA  <1 005*   PH UA  5 0   LEUKOCYTES UA  Negative   NITRITE UA  Negative   GLUCOSE UA mg/dl 1000 (1%)*   KETONES UA mg/dl 10 (1+)*   BILIRUBIN UA  Negative   BLOOD UA  Negative      Results from last 7 days   Lab Units 06/24/23  1335   RBC UA /hpf 0-1   WBC UA /hpf 0-1   EPITHELIAL CELLS WET PREP /hpf None Seen   BACTERIA UA /hpf None Seen      Results from last 7 days   Lab Units 06/24/23  1351   BLOOD CULTURE  No Growth at 24 hrs  No Growth at 24 hrs  DISCHARGE DAY VISIT AND PHYSICAL EXAM:  Subjective: Patient seen and examined during morning rounds  No complaints  Like to be discharged  Vitals:   Blood Pressure: 118/86 (06/26/23 0720)  Pulse: (!) 106 (06/26/23 0720)  Temperature: 98 °F (36 7 °C) (06/26/23 0720)  Temp Source: Axillary (06/26/23 0720)  Respirations: 20 (06/25/23 2300)  Height: 5' 11\" (180 3 cm) (06/25/23 0535)  Weight - Scale: (!) 143 kg (316 lb) (06/26/23 0548)  SpO2: 94 % (06/26/23 0720)    Physical Exam  Vitals reviewed  Constitutional:       General: He is not in acute distress       Appearance: Normal " appearance  He is obese  HENT:      Head: Atraumatic  Eyes:      Extraocular Movements: Extraocular movements intact  Cardiovascular:      Rate and Rhythm: Regular rhythm  Heart sounds: Normal heart sounds  Pulmonary:      Effort: Pulmonary effort is normal       Breath sounds: Decreased breath sounds and wheezing (Slight) present  Abdominal:      General: Bowel sounds are normal       Palpations: Abdomen is soft  Tenderness: There is no abdominal tenderness  There is no rebound  Musculoskeletal:         General: No swelling or tenderness  Skin:     General: Skin is warm and dry  Neurological:      General: No focal deficit present  Mental Status: He is alert  Cranial Nerves: No cranial nerve deficit  Psychiatric:         Mood and Affect: Mood normal        Planned Re-admission: No  Discharge Disposition: Home/Self Care    Test Results Pending at Discharge:   Pending Labs     Order Current Status    Blood culture #1 Preliminary result    Blood culture #2 Preliminary result      Incidental findings:     Medications   · Discharge Medication List: See after visit summary for reconciled discharge medications  Diet restrictions: Diabetic diet  Activity restrictions: No strenuous activity  Discharge Condition: stable    Outpatient Follow-Up and Discharge Instructions  See after visit summary section titled Discharge Instructions for information provided to patient and family  Code Status: Level 1 - Full Code  Discharge Statement   I spent 35 minutes discharging the patient  This time was spent on the day of discharge  Greater than 50% of total time was spent with the patient and / or family counseling and / or coordination of care  ** Please Note: This note has been constructed using a voice recognition system   **

## 2023-06-26 NOTE — ASSESSMENT & PLAN NOTE
· Urinary frequency as an outpatient requiring several antibiotics including nitrofurantoin, bactrim, and ciprofloxacin  · No evidence of UTI at this admission

## 2023-06-28 NOTE — UTILIZATION REVIEW
NOTIFICATION OF ADMISSION DISCHARGE   This is a Notification of Discharge from [address]  Please be advised that this patient has been discharge from our facility  Below you will find the admission and discharge date and time including the patient’s disposition  UTILIZATION REVIEW CONTACT:  Jsason Banda  Utilization   Network Utilization Review Department  Phone: [phone] x carefully listen to the prompts  All voicemails are confidential   Email: [email or web address]     ADMISSION INFORMATION  PRESENTATION DATE: 6/24/2023  1:26 PM  OBERVATION ADMISSION DATE:   INPATIENT ADMISSION DATE: 6/24/23  3:03 PM   DISCHARGE DATE: 6/26/2023  4:57 PM   DISPOSITION:Home/Self Care    IMPORTANT INFORMATION:  Send all requests for admission clinical reviews, approved or denied determinations and any other requests to dedicated fax number below belonging to the campus where the patient is receiving treatment   List of dedicated fax numbers:  [address] DENIALS (Administrative/Medical Necessity) [phone]   [address] (Maternity/NICU/Pediatrics) [phone]   [facility] [phone]   [facility] 87 [phone]   [address] [phone]   220 [facility] [phone] 90 [facility] [phone]   [address] [place], [place] [address] [phone]   [facility]  [phone] 4058 [facility] [phone]   412 [facility] [address] [facility] [phone]

## 2023-06-29 LAB
BACTERIA BLD CULT: NORMAL
BACTERIA BLD CULT: NORMAL

## 2023-07-03 ENCOUNTER — OFFICE VISIT (OUTPATIENT)
Dept: FAMILY MEDICINE CLINIC | Facility: CLINIC | Age: 58
End: 2023-07-03
Payer: COMMERCIAL

## 2023-07-03 VITALS
WEIGHT: 315 LBS | TEMPERATURE: 97.7 F | OXYGEN SATURATION: 91 % | DIASTOLIC BLOOD PRESSURE: 86 MMHG | SYSTOLIC BLOOD PRESSURE: 138 MMHG | HEART RATE: 108 BPM | BODY MASS INDEX: 45.19 KG/M2

## 2023-07-03 DIAGNOSIS — I10 PRIMARY HYPERTENSION: Chronic | ICD-10-CM

## 2023-07-03 DIAGNOSIS — J45.50 SEVERE PERSISTENT ASTHMA WITHOUT COMPLICATION: ICD-10-CM

## 2023-07-03 DIAGNOSIS — E11.65 TYPE 2 DIABETES MELLITUS WITH HYPERGLYCEMIA, WITHOUT LONG-TERM CURRENT USE OF INSULIN (HCC): Primary | ICD-10-CM

## 2023-07-03 PROCEDURE — 99495 TRANSJ CARE MGMT MOD F2F 14D: CPT | Performed by: FAMILY MEDICINE

## 2023-07-03 NOTE — PATIENT INSTRUCTIONS
I would have you restart your Lantus at 20 units once daily. I would also decrease your mealtime insulin to 20 units with each meal.  Continue to monitor your sugars.

## 2023-07-03 NOTE — PROGRESS NOTES
[facility]        NAME: Lashawn Moscoso is a 60 y.o. male  : 1965    MRN: [id number]  DATE: July 3, 2023  TIME: 3:57 PM    Assessment and Plan   Type 2 diabetes mellitus with hyperglycemia, without long-term current use of insulin (HCC) [E11.65]  1. Type 2 diabetes mellitus with hyperglycemia, without long-term current use of insulin ([address])        2. Severe persistent asthma without complication        3. Primary hypertension            No problem-specific Assessment & Plan notes found for this encounter. Patient Instructions     Patient Instructions   I would have you restart your Lantus at 20 units once daily. I would also decrease your mealtime insulin to 20 units with each meal.  Continue to monitor your sugars. Chief Complaint     Chief Complaint   Patient presents with   • Transition of Care Management         History of Present Illness       TCM Call     Date and time call was made  2023  7:28 PM    Hospital care reviewed  Records reviewed    Patient was hospitialized at  [address] [place] [address]    Date of Admission  23    Date of discharge  23    Diagnosis  Hyperglycemia due to type 2 diabetes mellitus    Disposition  Home    Were the patients medications reviewed and updated  Yes    Current Symptoms  Shortness of breath    Shortness of breath severity  Moderate      TCM Call     Post hospital issues  None    Should patient be enrolled in anticoag monitoring? No    Scheduled for follow up?   No    Did you obtain your prescribed medications  Yes    Do you need help managing your prescriptions or medications  No    Is transportation to your appointment needed  No    I have advised the patient to call PCP with any new or worsening symptoms    [id number] or [address] other    Support System  Partner    The type of support provided  Physical    Do you have social support  Yes, as much as I need    Are you recieving any outpatient services  No    Are you recieving home care services  No    Are you using any community resources  No    Have you fallen in the last 12 months  No    Interperter language line needed  No    Counseling  Patient    Counseling topics  instructions for management      Patient here for follow-up from hospitalization regarding very high sugar with DKA symptoms. Has been discharged on Lantus 40units  twice daily, he was only taking it once daily. Was getting low sugars below 80. Also taking NovoLog 26 units with each meal.      Review of Systems   Review of Systems   Constitutional: Positive for fatigue. Negative for activity change, appetite change and diaphoresis. HENT: Negative for congestion, sinus pressure and sore throat. Respiratory: Negative for cough, chest tightness, shortness of breath and wheezing. Cardiovascular: Negative for chest pain, palpitations and leg swelling. Fast or slow heart rate   Gastrointestinal: Negative for abdominal pain, blood in stool, constipation, diarrhea, nausea and vomiting. Genitourinary: Negative for difficulty urinating, dysuria, frequency and hematuria. Musculoskeletal: Positive for myalgias. Negative for arthralgias, gait problem and joint swelling. Neurological: Positive for weakness. Negative for dizziness, light-headedness and headaches. Psychiatric/Behavioral: Negative for agitation, confusion, dysphoric mood and sleep disturbance. The patient is not nervous/anxious. Current Medications       Current Outpatient Medications:   •  albuterol (PROVENTIL HFA,VENTOLIN HFA) 90 mcg/act inhaler, Inhale 2 puffs every 4 (four) hours as needed for wheezing, Disp: 18 g, Rfl: 0  •  Alcohol Swabs 70 % PADS, May substitute brand based on insurance coverage.  Check glucose TID., Disp: 100 each, Rfl: 0  •  allopurinol (ZYLOPRIM) 100 mg tablet, take 2 tablets by mouth once daily, Disp: 180 tablet, Rfl: 0  •  atorvastatin (LIPITOR) 20 mg tablet, take 1 tablet by mouth once daily, Disp: 90 tablet, Rfl: 0  •  Blood Glucose Monitoring Suppl (OneTouch Verio Reflect) w/Device KIT, May substitute brand based on insurance coverage. Check glucose TID., Disp: 1 kit, Rfl: 0  •  budesonide-formoterol (Symbicort) 160-4.5 mcg/act inhaler, Inhale 2 puffs 2 (two) times a day Rinse mouth after use, Disp: 10.2 g, Rfl: 5  •  glucose blood (OneTouch Verio) test strip, May substitute brand based on insurance coverage. Check glucose TID., Disp: 100 each, Rfl: 0  •  HYDROcodone-acetaminophen (NORCO)  mg per tablet, Take 1 tablet by mouth every 4 (four) hours as needed, Disp: , Rfl:   •  Insulin Glargine Solostar (Lantus SoloStar) 100 UNIT/ML SOPN, Inject 0.4 mL (40 Units total) under the skin 2 (two) times a day, Disp: 30 mL, Rfl: 0  •  insulin lispro (HumaLOG KwikPen) 100 units/mL injection pen, Inject 26 Units under the skin 3 (three) times a day with meals, Disp: 30 mL, Rfl: 0  •  Insulin Pen Needle (BD Pen Needle Dianna 2nd Gen) 32G X 4 MM MISC, For use with insulin pen. Pharmacy may dispense brand covered by insurance., Disp: 100 each, Rfl: 0  •  lisinopril (ZESTRIL) 40 mg tablet, take 1 tablet by mouth once daily, Disp: 90 tablet, Rfl: 0  •  Magnesium 500 MG TABS, Take by mouth, Disp: , Rfl:   •  Multiple Vitamin (MULTI-VITAMIN DAILY PO), Take 1 tablet by mouth daily, Disp: , Rfl:   •  OneTouch Delica Lancets 08G MISC, May substitute brand based on insurance coverage.  Check glucose TID., Disp: 100 each, Rfl: 0  •  potassium chloride (K-DUR,KLOR-CON) 10 mEq tablet, Take 1 tablet by mouth 2 (two) times a day, Disp: 60 tablet, Rfl: 0  •  rOPINIRole (REQUIP) 2 mg tablet, take 1 tablet by mouth twice a day, Disp: 180 tablet, Rfl: 0  •  triamcinolone (KENALOG) 0.1 % cream, Apply topically 2 (two) times a day, Disp: 30 g, Rfl: 0  •  Trulicity 1.5 WK/1.4QX injection, inject 0.5 milliliters ( 1 AND 1/2 milligrams ) subcutaneously ONCE EVERY WEEK, Disp: 6 mL, Rfl: 0    Current Allergies     Allergies as of 07/03/2023 - Reviewed 07/03/2023   Allergen Reaction Noted   • Other Cough and Nasal Congestion 08/16/2019            The following portions of the patient's history were reviewed and updated as appropriate: allergies, current medications, past family history, past medical history, past social history, past surgical history and problem list.     Past Medical History:   Diagnosis Date   • Asthma    • Chronic pain    • Diabetes mellitus ([address])    • Gout    • Hyperlipidemia    • Hypertension    • Impaired fasting glucose     last assessed: 12/16/2015   • Knee arthropathy     last assessed: 3/23/2016       Past Surgical History:   Procedure Laterality Date   • KNEE ARTHROSCOPY Bilateral    • KNEE SURGERY         Family History   Problem Relation Age of Onset   • Coronary artery disease Father    • Cancer Other         malignant neoplasm   • Asthma Brother    • No Known Problems Daughter    • Asthma Mother    • COPD Mother    • Asthma Sister    • No Known Problems Brother    • Asthma Son    • No Known Problems Son          Medications have been verified. Objective   /86 (BP Location: Left arm, Patient Position: Sitting, Cuff Size: Standard)   Pulse (!) 108   Temp 97.7 °F (36.5 °C) (Tympanic)   Wt (!) 147 kg (324 lb)   SpO2 91%   BMI 45.19 kg/m²        Physical Exam     Physical Exam  Vitals reviewed. Constitutional:       General: He is not in acute distress. Appearance: He is well-developed. He is not ill-appearing. HENT:      Head: Normocephalic and atraumatic. Right Ear: Tympanic membrane and external ear normal. No drainage. Left Ear: Tympanic membrane normal. No drainage. Nose: No congestion. Mouth/Throat:      Pharynx: No oropharyngeal exudate or posterior oropharyngeal erythema. Eyes:      General:         Right eye: No discharge. Left eye: No discharge. Extraocular Movements: Extraocular movements intact. Conjunctiva/sclera: Conjunctivae normal.      Pupils: Pupils are equal, round, and reactive to light. Neck:      Thyroid: No thyromegaly. Cardiovascular:      Rate and Rhythm: Normal rate and regular rhythm. Heart sounds: Normal heart sounds. Pulmonary:      Effort: Pulmonary effort is normal. No respiratory distress. Breath sounds: No wheezing or rales. Musculoskeletal:      Cervical back: Normal range of motion and neck supple. Right lower leg: No edema. Left lower leg: No edema. Lymphadenopathy:      Cervical: No cervical adenopathy. Skin:     Findings: No rash. Neurological:      Mental Status: He is alert.    Psychiatric:         Behavior: Behavior normal.

## 2023-07-13 ENCOUNTER — TELEPHONE (OUTPATIENT)
Dept: FAMILY MEDICINE CLINIC | Facility: CLINIC | Age: 58
End: 2023-07-13

## 2023-07-13 DIAGNOSIS — R60.9 DEPENDENT EDEMA: Primary | ICD-10-CM

## 2023-07-13 RX ORDER — FUROSEMIDE 20 MG/1
TABLET ORAL
Qty: 90 TABLET | Refills: 3 | Status: SHIPPED | OUTPATIENT
Start: 2023-07-13

## 2023-07-13 NOTE — TELEPHONE ENCOUNTER
Pt wanted to know if he should go on Glipizide again ? · Platelets stable at 94 on admission   · Currently 76   · No s/s of bleeding  · Likely 2/2 alcoholic liver disease vs viral infection as above  · CBC in am

## 2023-07-13 NOTE — TELEPHONE ENCOUNTER
Blood Sugar has been good around 100-140 before meals after meals is 150-170. Pt has been sitting all day and pts feet is swelling at night when he gets home from work. Pt is off the flipiside water pill. Pt is not sure if you would like to prescribe something. If possible, pt would like it sent to the [facility] in [facility].

## 2023-07-16 DIAGNOSIS — E11.65 HYPERGLYCEMIA DUE TO TYPE 2 DIABETES MELLITUS (HCC): ICD-10-CM

## 2023-07-17 RX ORDER — LANCETS 33 GAUGE
EACH MISCELLANEOUS
Qty: 100 EACH | Refills: 10 | Status: SHIPPED | OUTPATIENT
Start: 2023-07-17

## 2023-07-17 RX ORDER — BLOOD SUGAR DIAGNOSTIC
STRIP MISCELLANEOUS
Qty: 100 STRIP | Refills: 10 | Status: SHIPPED | OUTPATIENT
Start: 2023-07-17

## 2023-08-09 ENCOUNTER — TELEPHONE (OUTPATIENT)
Dept: FAMILY MEDICINE CLINIC | Facility: CLINIC | Age: 58
End: 2023-08-09

## 2023-08-09 DIAGNOSIS — M10.00 IDIOPATHIC GOUT, UNSPECIFIED CHRONICITY, UNSPECIFIED SITE: Primary | ICD-10-CM

## 2023-08-09 DIAGNOSIS — E11.65 TYPE 2 DIABETES MELLITUS WITH HYPERGLYCEMIA, WITHOUT LONG-TERM CURRENT USE OF INSULIN (HCC): ICD-10-CM

## 2023-08-09 LAB
ALBUMIN SERPL-MCNC: 4.5 G/DL (ref 3.8–4.9)
ALBUMIN/CREAT UR: 19 MG/G CREAT (ref 0–29)
ALBUMIN/GLOB SERPL: 1.6 {RATIO} (ref 1.2–2.2)
ALP SERPL-CCNC: 97 IU/L (ref 44–121)
ALT SERPL-CCNC: 20 IU/L (ref 0–44)
AST SERPL-CCNC: 18 IU/L (ref 0–40)
BILIRUB SERPL-MCNC: 0.5 MG/DL (ref 0–1.2)
BUN SERPL-MCNC: 10 MG/DL (ref 6–24)
BUN/CREAT SERPL: 14 (ref 9–20)
CALCIUM SERPL-MCNC: 9.6 MG/DL (ref 8.7–10.2)
CHLORIDE SERPL-SCNC: 101 MMOL/L (ref 96–106)
CHOLEST SERPL-MCNC: 148 MG/DL (ref 100–199)
CO2 SERPL-SCNC: 26 MMOL/L (ref 20–29)
CREAT SERPL-MCNC: 0.71 MG/DL (ref 0.76–1.27)
CREAT UR-MCNC: 197 MG/DL
EGFR: 106 ML/MIN/1.73
GLOBULIN SER-MCNC: 2.8 G/DL (ref 1.5–4.5)
GLUCOSE SERPL-MCNC: 128 MG/DL (ref 70–99)
HBA1C MFR BLD: 8.1 % (ref 4.8–5.6)
HDLC SERPL-MCNC: 46 MG/DL
LDLC SERPL CALC-MCNC: 77 MG/DL (ref 0–99)
LDLC/HDLC SERPL: 1.7 RATIO (ref 0–3.6)
MICROALBUMIN UR-MCNC: 38.2 UG/ML
POTASSIUM SERPL-SCNC: 4.8 MMOL/L (ref 3.5–5.2)
PROT SERPL-MCNC: 7.3 G/DL (ref 6–8.5)
PSA SERPL-MCNC: 1 NG/ML (ref 0–4)
SL AMB REFLEX CRITERIA: NORMAL
SL AMB VLDL CHOLESTEROL CALC: 25 MG/DL (ref 5–40)
SODIUM SERPL-SCNC: 142 MMOL/L (ref 134–144)
TRIGL SERPL-MCNC: 142 MG/DL (ref 0–149)
URATE SERPL-MCNC: 5 MG/DL (ref 3.8–8.4)

## 2023-08-09 NOTE — TELEPHONE ENCOUNTER
----- Message from Ying Sandoval MD sent at 8/9/2023 12:52 PM EDT -----  A1C much better---repeat 3 mos---thx for the fish!  ----- Message -----  From: Zane Silver Lab Results In  Sent: 8/9/2023  12:05 PM EDT  To: Ying Sandoval MD

## 2023-08-14 ENCOUNTER — NURSE TRIAGE (OUTPATIENT)
Dept: OTHER | Facility: OTHER | Age: 58
End: 2023-08-14

## 2023-08-14 DIAGNOSIS — E11.65 HYPERGLYCEMIA DUE TO TYPE 2 DIABETES MELLITUS (HCC): ICD-10-CM

## 2023-08-14 RX ORDER — INSULIN LISPRO 100 [IU]/ML
26 INJECTION, SOLUTION INTRAVENOUS; SUBCUTANEOUS
Qty: 30 ML | Refills: 0 | Status: CANCELLED | OUTPATIENT
Start: 2023-08-14

## 2023-08-14 NOTE — TELEPHONE ENCOUNTER
Regarding: Urgent Med Refill- HumaLOG KwikPen) 100 units/mL injection pen  ----- Message from [id number] [facility] sent at 8/14/2023  6:24 PM EDT -----  Medication Refill Request     Name HumaLOG KwikPen) 100 units/mL injection pen    Dose/Frequency Inject 26 Units under the skin 3 (three) times a day with meals  Quantity 30 mL  Verified pharmacy   [ [id number] - [address] [place] [address] Avenue  Verified ordering Provider   [ Jordan Palomo  Does patient have enough for the next 3 days?  Yes [ ] No [ Jordan Palomo

## 2023-08-14 NOTE — TELEPHONE ENCOUNTER
Reason for Disposition  • [1] Caller requesting a prescription renewal (no refills left), no triage required, AND [2] triager able to renew prescription per department policy    Answer Assessment - Initial Assessment Questions  1. DRUG NAME: "What medicine do you need to have refilled?"      Humalog Kwikpen   2. REFILLS REMAINING: "How many refills are remaining?" (Note: The label on the medicine or pill bottle will show how many refills are remaining. If there are no refills remaining, then a renewal may be needed.)      0    Protocols used: MEDICATION REFILL AND RENEWAL CALL-ADULT-    Sent refill to [facility] Refill in-basket.

## 2023-08-15 DIAGNOSIS — E11.65 HYPERGLYCEMIA DUE TO TYPE 2 DIABETES MELLITUS (HCC): ICD-10-CM

## 2023-08-15 RX ORDER — INSULIN LISPRO 100 [IU]/ML
26 INJECTION, SOLUTION INTRAVENOUS; SUBCUTANEOUS
Qty: 30 ML | Refills: 10 | Status: SHIPPED | OUTPATIENT
Start: 2023-08-15

## 2023-08-24 DIAGNOSIS — G25.81 RESTLESS LEG: ICD-10-CM

## 2023-08-24 RX ORDER — ROPINIROLE 2 MG/1
2 TABLET, FILM COATED ORAL 2 TIMES DAILY
Qty: 180 TABLET | Refills: 0 | Status: SHIPPED | OUTPATIENT
Start: 2023-08-24

## 2023-09-03 DIAGNOSIS — E78.2 MIXED HYPERLIPIDEMIA: Chronic | ICD-10-CM

## 2023-09-03 RX ORDER — ATORVASTATIN CALCIUM 20 MG/1
TABLET, FILM COATED ORAL
Qty: 90 TABLET | Refills: 0 | Status: SHIPPED | OUTPATIENT
Start: 2023-09-03

## 2023-09-06 DIAGNOSIS — E11.65 TYPE 2 DIABETES MELLITUS WITH HYPERGLYCEMIA, WITHOUT LONG-TERM CURRENT USE OF INSULIN (HCC): ICD-10-CM

## 2023-09-06 RX ORDER — DULAGLUTIDE 1.5 MG/.5ML
INJECTION, SOLUTION SUBCUTANEOUS
Qty: 6 ML | Refills: 0 | Status: SHIPPED | OUTPATIENT
Start: 2023-09-06

## 2023-09-25 DIAGNOSIS — E11.65 TYPE 2 DIABETES MELLITUS WITH HYPERGLYCEMIA, WITHOUT LONG-TERM CURRENT USE OF INSULIN (HCC): ICD-10-CM

## 2023-09-25 DIAGNOSIS — E03.9 ACQUIRED HYPOTHYROIDISM: ICD-10-CM

## 2023-09-26 RX ORDER — LISINOPRIL 40 MG/1
40 TABLET ORAL DAILY
Qty: 90 TABLET | Refills: 3 | Status: SHIPPED | OUTPATIENT
Start: 2023-09-26

## 2023-09-26 RX ORDER — DULAGLUTIDE 1.5 MG/.5ML
1.5 INJECTION, SOLUTION SUBCUTANEOUS
Qty: 6 ML | Refills: 0 | Status: SHIPPED | OUTPATIENT
Start: 2023-09-26

## 2023-10-08 ENCOUNTER — NURSE TRIAGE (OUTPATIENT)
Dept: OTHER | Facility: OTHER | Age: 58
End: 2023-10-08

## 2023-10-08 DIAGNOSIS — J40 BRONCHITIS: ICD-10-CM

## 2023-10-08 RX ORDER — BUDESONIDE AND FORMOTEROL FUMARATE DIHYDRATE 160; 4.5 UG/1; UG/1
2 AEROSOL RESPIRATORY (INHALATION) 2 TIMES DAILY
Qty: 10.2 G | Refills: 1 | Status: SHIPPED | OUTPATIENT
Start: 2023-10-08

## 2023-10-08 NOTE — TELEPHONE ENCOUNTER
Reason for Disposition  • [1] Caller requesting a prescription renewal (no refills left), no triage required, AND [2] triager able to renew prescription per department policy    Answer Assessment - Initial Assessment Questions  1. NAME of MEDICATION: "What medicine are you calling about?"      Symbicort inhaler    2. QUESTION: "What is your question?" (e.g., medication refill, side effect)  Refill    3. PRESCRIBING HCP: "Who prescribed it?" Reason: if prescribed by specialist, call should be referred to that group. Dr. Renee Holland    4.  SYMPTOMS: "Do you have any symptoms?"   no    Protocols used: MEDICATION QUESTION CALL-ADULT-

## 2023-10-08 NOTE — TELEPHONE ENCOUNTER
Regarding: Urgent Med Refill  ----- Message from Loren Gramajo sent at 10/8/2023 11:35 AM EDT -----  "I am out of  my symbicort and when I asked the pharmacy to fill it, they said they didn't have a record of that medication"      Medication Refill Request     Name budesonide-formoterol (Symbicort) 160-4.5 mcg/act inhaler     Dose/Frequency  Inhale 2 puffs 2 (two) times a day Rinse mouth after use    Quantity 10.2 g    Verified pharmacy  [id number] - [address] [place] [address]   [address] [place] [address]     Does patient have enough for the next 3 days?  Yes [ ] No [X]

## 2023-10-09 DIAGNOSIS — E11.65 HYPERGLYCEMIA DUE TO TYPE 2 DIABETES MELLITUS (HCC): ICD-10-CM

## 2023-10-09 DIAGNOSIS — E11.65 TYPE 2 DIABETES MELLITUS WITH HYPERGLYCEMIA, WITHOUT LONG-TERM CURRENT USE OF INSULIN (HCC): ICD-10-CM

## 2023-10-10 RX ORDER — INSULIN LISPRO 100 [IU]/ML
26 INJECTION, SOLUTION INTRAVENOUS; SUBCUTANEOUS
Qty: 30 ML | Refills: 10 | Status: SHIPPED | OUTPATIENT
Start: 2023-10-10

## 2023-10-10 RX ORDER — DULAGLUTIDE 1.5 MG/.5ML
1.5 INJECTION, SOLUTION SUBCUTANEOUS
Qty: 6 ML | Refills: 0 | Status: SHIPPED | OUTPATIENT
Start: 2023-10-10

## 2023-11-13 ENCOUNTER — TELEPHONE (OUTPATIENT)
Dept: FAMILY MEDICINE CLINIC | Facility: CLINIC | Age: 58
End: 2023-11-13

## 2023-11-13 NOTE — TELEPHONE ENCOUNTER
Patient's girlfriend called in requesting for a doctor's letter to be sent to [facility] stating he's on a nebulizer and needs his power on. Fax number is [phone]. Patient needs it faxed by Wednesday. Ok to fax?

## 2023-11-16 DIAGNOSIS — G25.81 RESTLESS LEG: ICD-10-CM

## 2023-11-17 DIAGNOSIS — J40 BRONCHITIS: ICD-10-CM

## 2023-11-17 RX ORDER — ROPINIROLE 2 MG/1
2 TABLET, FILM COATED ORAL 2 TIMES DAILY
Qty: 180 TABLET | Refills: 3 | Status: SHIPPED | OUTPATIENT
Start: 2023-11-17

## 2023-11-18 RX ORDER — BUDESONIDE AND FORMOTEROL FUMARATE DIHYDRATE 160; 4.5 UG/1; UG/1
AEROSOL RESPIRATORY (INHALATION)
Qty: 10.2 G | Refills: 10 | Status: SHIPPED | OUTPATIENT
Start: 2023-11-18

## 2023-12-24 DIAGNOSIS — G25.81 RESTLESS LEG: ICD-10-CM

## 2023-12-27 RX ORDER — ROPINIROLE 2 MG/1
2 TABLET, FILM COATED ORAL 3 TIMES DAILY
Qty: 270 TABLET | Refills: 3 | Status: SHIPPED | OUTPATIENT
Start: 2023-12-27

## 2024-01-09 DIAGNOSIS — E11.65 TYPE 2 DIABETES MELLITUS WITH HYPERGLYCEMIA, WITHOUT LONG-TERM CURRENT USE OF INSULIN (HCC): ICD-10-CM

## 2024-01-09 RX ORDER — DULAGLUTIDE 1.5 MG/.5ML
1.5 INJECTION, SOLUTION SUBCUTANEOUS
Qty: 6 ML | Refills: 0 | Status: SHIPPED | OUTPATIENT
Start: 2024-01-09 | End: 2024-01-11 | Stop reason: SDUPTHER

## 2024-01-11 DIAGNOSIS — E11.65 TYPE 2 DIABETES MELLITUS WITH HYPERGLYCEMIA, WITHOUT LONG-TERM CURRENT USE OF INSULIN (HCC): ICD-10-CM

## 2024-01-12 RX ORDER — DULAGLUTIDE 1.5 MG/.5ML
1.5 INJECTION, SOLUTION SUBCUTANEOUS
Qty: 6 ML | Refills: 0 | Status: SHIPPED | OUTPATIENT
Start: 2024-01-12

## 2024-01-13 DIAGNOSIS — J40 BRONCHITIS: ICD-10-CM

## 2024-01-14 RX ORDER — FLUTICASONE PROPIONATE AND SALMETEROL 250; 50 UG/1; UG/1
1 POWDER RESPIRATORY (INHALATION) 2 TIMES DAILY
Qty: 180 BLISTER | Refills: 3 | Status: SHIPPED | OUTPATIENT
Start: 2024-01-14

## 2024-01-28 DIAGNOSIS — G25.81 RESTLESS LEG: ICD-10-CM

## 2024-01-29 ENCOUNTER — TELEPHONE (OUTPATIENT)
Dept: FAMILY MEDICINE CLINIC | Facility: CLINIC | Age: 59
End: 2024-01-29

## 2024-01-29 ENCOUNTER — PATIENT MESSAGE (OUTPATIENT)
Dept: FAMILY MEDICINE CLINIC | Facility: CLINIC | Age: 59
End: 2024-01-29

## 2024-01-29 DIAGNOSIS — E11.65 TYPE 2 DIABETES MELLITUS WITH HYPERGLYCEMIA, WITHOUT LONG-TERM CURRENT USE OF INSULIN (HCC): ICD-10-CM

## 2024-01-29 NOTE — TELEPHONE ENCOUNTER
Trulicity not in stock at [facility] pharmacy. Can you please send something else to pharmacy.

## 2024-01-30 DIAGNOSIS — E11.65 TYPE 2 DIABETES MELLITUS WITH HYPERGLYCEMIA, WITHOUT LONG-TERM CURRENT USE OF INSULIN (HCC): ICD-10-CM

## 2024-01-30 RX ORDER — DULAGLUTIDE 1.5 MG/.5ML
1.5 INJECTION, SOLUTION SUBCUTANEOUS
Qty: 6 ML | Refills: 3 | Status: SHIPPED | OUTPATIENT
Start: 2024-01-30 | End: 2024-01-30 | Stop reason: SDUPTHER

## 2024-01-30 RX ORDER — DULAGLUTIDE 1.5 MG/.5ML
1.5 INJECTION, SOLUTION SUBCUTANEOUS
Qty: 6 ML | Refills: 3 | Status: SHIPPED | OUTPATIENT
Start: 2024-01-30

## 2024-01-31 RX ORDER — ROPINIROLE 2 MG/1
2 TABLET, FILM COATED ORAL 3 TIMES DAILY
Qty: 270 TABLET | Refills: 3 | Status: SHIPPED | OUTPATIENT
Start: 2024-01-31

## 2024-02-22 LAB — HBA1C MFR BLD: 6.9 % (ref 4.8–5.6)

## 2024-02-23 ENCOUNTER — TELEPHONE (OUTPATIENT)
Dept: FAMILY MEDICINE CLINIC | Facility: CLINIC | Age: 59
End: 2024-02-23

## 2024-02-23 NOTE — TELEPHONE ENCOUNTER
----- Message from Charly Harris MD sent at 2/23/2024  7:13 AM EST -----  A1c great-----RP 6mos  ----- Message -----  From: Martin Suarez Lab Results In  Sent: 2/22/2024  10:05 PM EST  To: Charly Harris MD

## 2024-02-27 ENCOUNTER — OFFICE VISIT (OUTPATIENT)
Dept: FAMILY MEDICINE CLINIC | Facility: CLINIC | Age: 59
End: 2024-02-27
Payer: COMMERCIAL

## 2024-02-27 VITALS — SYSTOLIC BLOOD PRESSURE: 134 MMHG | DIASTOLIC BLOOD PRESSURE: 80 MMHG

## 2024-02-27 DIAGNOSIS — M10.00 IDIOPATHIC GOUT, UNSPECIFIED CHRONICITY, UNSPECIFIED SITE: ICD-10-CM

## 2024-02-27 DIAGNOSIS — J45.50 SEVERE PERSISTENT ASTHMA WITHOUT COMPLICATION: ICD-10-CM

## 2024-02-27 DIAGNOSIS — Z00.00 WELLNESS EXAMINATION: ICD-10-CM

## 2024-02-27 DIAGNOSIS — I10 PRIMARY HYPERTENSION: Chronic | ICD-10-CM

## 2024-02-27 DIAGNOSIS — Z23 ENCOUNTER FOR IMMUNIZATION: ICD-10-CM

## 2024-02-27 DIAGNOSIS — J45.20 MILD INTERMITTENT ASTHMA WITHOUT COMPLICATION: ICD-10-CM

## 2024-02-27 DIAGNOSIS — M10.9 GOUT, UNSPECIFIED CAUSE, UNSPECIFIED CHRONICITY, UNSPECIFIED SITE: ICD-10-CM

## 2024-02-27 DIAGNOSIS — E11.00 TYPE 2 DIABETES MELLITUS WITH HYPEROSMOLARITY WITHOUT COMA, WITHOUT LONG-TERM CURRENT USE OF INSULIN (HCC): Primary | Chronic | ICD-10-CM

## 2024-02-27 PROCEDURE — 90677 PCV20 VACCINE IM: CPT

## 2024-02-27 PROCEDURE — 99214 OFFICE O/P EST MOD 30 MIN: CPT | Performed by: FAMILY MEDICINE

## 2024-02-27 PROCEDURE — 99396 PREV VISIT EST AGE 40-64: CPT | Performed by: FAMILY MEDICINE

## 2024-02-27 PROCEDURE — 90471 IMMUNIZATION ADMIN: CPT

## 2024-02-27 RX ORDER — FLUTICASONE FUROATE AND VILANTEROL 200; 25 UG/1; UG/1
1 POWDER RESPIRATORY (INHALATION) DAILY
Qty: 60 BLISTER | Refills: 5 | Status: SHIPPED | OUTPATIENT
Start: 2024-02-27 | End: 2024-08-25

## 2024-02-27 RX ORDER — ALLOPURINOL 100 MG/1
200 TABLET ORAL DAILY
Qty: 180 TABLET | Refills: 3 | Status: SHIPPED | OUTPATIENT
Start: 2024-02-27

## 2024-02-27 NOTE — PROGRESS NOTES
HPI:  Garry Tate is a 59 y.o. male here for his yearly health maintenance exam.   Patient Active Problem List   Diagnosis    Hyponatremia    Diabetes mellitus, type 2 (HCC)    Hyperglycemia due to type 2 diabetes mellitus (HCC)    Weakness generalized    Hypokalemia    Hypertension    Hyperlipidemia    Chronic pain syndrome    Severe persistent allergic asthma with acute exacerbation    Obstructive sleep apnea    Severe persistent asthma without complication    Vasomotor rhinitis    Gout     Past Medical History:   Diagnosis Date    Asthma     Chronic pain     Diabetes mellitus (HCC)     Gout     Hyperlipidemia     Hypertension     Impaired fasting glucose     last assessed: 12/16/2015    Knee arthropathy     last assessed: 3/23/2016       1. Advanced Directive: n     2. Durable Power of  for Healthcare: n     3. Social History:           Drug and alcohol History: n                  4. Immunizations up to date: y                 Lifestyle:                           Healthy Diet:y                          Alcohol Use:n                          Tobacco Use:n                          Regular exercise:y                          Weight concerns:y                               5. Over the past 2 weeks, how often have you been bothered by the following:              Little interest or pleasure in doing things:n              Felling down, depressed or hopeless:n       Current Outpatient Medications   Medication Sig Dispense Refill    albuterol (PROVENTIL HFA,VENTOLIN HFA) 90 mcg/act inhaler Inhale 2 puffs every 4 (four) hours as needed for wheezing 18 g 0    Alcohol Swabs 70 % PADS May substitute brand based on insurance coverage. Check glucose TID. 100 each 0    allopurinol (ZYLOPRIM) 100 mg tablet Take 2 tablets (200 mg total) by mouth daily 180 tablet 3    atorvastatin (LIPITOR) 20 mg tablet TAKE 1 TABLET BY MOUTH EVERY DAY 90 tablet 3    Blood Glucose Monitoring Suppl (OneTouch Verio Reflect) w/Device KIT May  substitute brand based on insurance coverage. Check glucose TID. 1 kit 0    Fluticasone-Salmeterol (Wixela Inhub) 250-50 mcg/dose inhaler Inhale 1 puff 2 (two) times a day 180 blister 3    fluticasone-vilanterol 200-25 mcg/actuation inhaler Inhale 1 puff daily Rinse mouth after use. 60 blister 5    furosemide (LASIX) 20 mg tablet 1 tab PRN edema---try to limit use to sev times weekly 90 tablet 3    glucose blood (OneTouch Verio) test strip CHECK BLOOD GLUCOSE 3 TIMES A  strip 10    HYDROcodone-acetaminophen (NORCO)  mg per tablet Take 1 tablet by mouth every 4 (four) hours as needed      Insulin Glargine Solostar (Lantus SoloStar) 100 UNIT/ML SOPN Inject 0.4 mL (40 Units total) under the skin 2 (two) times a day 30 mL 0    insulin lispro (HumaLOG KwikPen) 100 units/mL injection pen Inject 26 Units under the skin 3 (three) times a day with meals 30 mL 10    Insulin Pen Needle (BD Pen Needle Dianna 2nd Gen) 32G X 4 MM MISC For use with insulin pen. Pharmacy may dispense brand covered by insurance. 100 each 0    Lancets (OneTouch Delica Plus [id number]) MISC CHECK GLUCOSE 3 TIMES A  each 10    lisinopril (ZESTRIL) 40 mg tablet Take 1 tablet (40 mg total) by mouth daily 90 tablet 3    Magnesium 500 MG TABS Take by mouth      Multiple Vitamin (MULTI-VITAMIN DAILY PO) Take 1 tablet by mouth daily      potassium chloride (K-DUR,KLOR-CON) 10 mEq tablet Take 1 tablet by mouth 2 (two) times a day 60 tablet 0    rOPINIRole (REQUIP) 2 mg tablet Take 1 tablet (2 mg total) by mouth 3 (three) times a day 270 tablet 3    semaglutide (Rybelsus) 3 MG tablet Take 1 tablet (3 mg total) by mouth daily before breakfast 30 tablet 0    triamcinolone (KENALOG) 0.1 % cream Apply topically 2 (two) times a day 30 g 0     No current facility-administered medications for this visit.     Allergies   Allergen Reactions    Other Cough and Nasal Congestion     Seasonal allergies      Immunization History   Administered Date(s)  Administered    COVID-19 MODERNA VACC 0.5 ML IM 05/15/2021, 06/11/2021    INFLUENZA 09/03/2014, 12/16/2015, 09/02/2016    Influenza, recombinant, quadrivalent,injectable, preservative free 10/30/2019, 09/30/2020, 01/31/2022    Influenza, seasonal, injectable 09/03/2014, 09/02/2016, 08/30/2017    Pneumococcal Conjugate 13-Valent 08/30/2017    Pneumococcal Polysaccharide PPV23 08/12/2013    Tdap 07/28/2018       Patient Care Team:  Charly Harris MD as PCP - General  MD Omari Negron MD    Review of Systems   Constitutional:  Negative for fatigue, fever and unexpected weight change.   HENT:  Negative for congestion, sinus pain and sore throat.    Eyes:  Negative for visual disturbance.   Respiratory:  Negative for shortness of breath and wheezing.    Cardiovascular:  Negative for chest pain and palpitations.   Gastrointestinal:  Negative for abdominal pain, nausea and vomiting.   Musculoskeletal: Negative.  Negative for arthralgias and myalgias.   Neurological:  Negative for syncope, weakness and numbness.   Psychiatric/Behavioral: Negative.  Negative for confusion, dysphoric mood and suicidal ideas.        Physical Exam :  Physical Exam  Constitutional:       Appearance: He is well-developed.   HENT:      Right Ear: Ear canal normal. Tympanic membrane is not injected.      Left Ear: Ear canal normal. Tympanic membrane is not injected.      Nose: Nose normal.   Eyes:      General:         Right eye: No discharge.         Left eye: No discharge.      Conjunctiva/sclera: Conjunctivae normal.      Pupils: Pupils are equal, round, and reactive to light.   Neck:      Thyroid: No thyromegaly.   Cardiovascular:      Rate and Rhythm: Normal rate and regular rhythm.      Heart sounds: Normal heart sounds. No murmur heard.  Pulmonary:      Effort: Pulmonary effort is normal. No respiratory distress.      Breath sounds: Normal breath sounds. No wheezing.   Abdominal:      General: Bowel sounds are normal. There is  no distension.      Palpations: Abdomen is soft.      Tenderness: There is no abdominal tenderness.   Musculoskeletal:         General: Normal range of motion.      Cervical back: Normal range of motion and neck supple.   Lymphadenopathy:      Cervical: No cervical adenopathy.   Skin:     General: Skin is warm and dry.   Neurological:      Mental Status: He is alert and oriented to person, place, and time. He is not disoriented.      Sensory: No sensory deficit.      Motor: No weakness.      Coordination: Coordination normal.      Gait: Gait normal.      Deep Tendon Reflexes: Reflexes are normal and symmetric.   Psychiatric:         Speech: Speech normal.         Behavior: Behavior normal.         Thought Content: Thought content normal.         Judgment: Judgment normal.           Assessment and Plan:  1. Type 2 diabetes mellitus with hyperosmolarity without coma, without long-term current use of insulin (HCC)  semaglutide (Rybelsus) 3 MG tablet      2. Severe persistent asthma without complication        3. Primary hypertension        4. Idiopathic gout, unspecified chronicity, unspecified site        5. Wellness examination        6. Gout, unspecified cause, unspecified chronicity, unspecified site  allopurinol (ZYLOPRIM) 100 mg tablet      7. Mild intermittent asthma without complication  fluticasone-vilanterol 200-25 mcg/actuation inhaler          Health Maintenance Due   Topic Date Due    Hepatitis C Screening  Never done    HIV Screening  Never done    Zoster Vaccine (1 of 2) Never done    Influenza Vaccine (1) 09/01/2023    COVID-19 Vaccine (3 - 2023-24 season) 09/01/2023    DM Eye Exam  11/20/2023    Diabetic Foot Exam  02/07/2024    Colorectal Cancer Screening  03/24/2024

## 2024-02-27 NOTE — PROGRESS NOTES
Assessment/Plan:    No problem-specific Assessment & Plan notes found for this encounter.       Diagnoses and all orders for this visit:    Type 2 diabetes mellitus with hyperosmolarity without coma, without long-term current use of insulin (HCC)  -     Discontinue: semaglutide (Rybelsus) 3 MG tablet; Take 1 tablet (3 mg total) by mouth daily before breakfast  -     semaglutide (Rybelsus) 7 MG tablet; Take 1 tablet (7 mg total) by mouth daily before breakfast    Severe persistent asthma without complication    Primary hypertension    Idiopathic gout, unspecified chronicity, unspecified site    Wellness examination    Gout, unspecified cause, unspecified chronicity, unspecified site  -     allopurinol (ZYLOPRIM) 100 mg tablet; Take 2 tablets (200 mg total) by mouth daily    Mild intermittent asthma without complication  -     fluticasone-vilanterol 200-25 mcg/actuation inhaler; Inhale 1 puff daily Rinse mouth after use.          Subjective:   Chief Complaint   Patient presents with    Physical Exam     Foot exam  Eye exam done within year         Patient ID: Garry Tate is a 59 y.o. male.    HPI    The following portions of the patient's history were reviewed and updated as appropriate: allergies, current medications, past family history, past medical history, past social history, past surgical history and problem list.    Review of Systems   Constitutional:  Negative for fatigue, fever and unexpected weight change.   HENT:  Negative for congestion, sinus pressure and sore throat.    Eyes:  Negative for visual disturbance.   Respiratory:  Negative for shortness of breath and wheezing.    Cardiovascular:  Negative for chest pain and palpitations.   Gastrointestinal:  Negative for abdominal pain, diarrhea, nausea and vomiting.         Objective:  Vitals:    02/27/24 0726   BP: 134/80   BP Location: Left arm   Patient Position: Sitting   Cuff Size: Standard      Physical Exam  Constitutional:       General: He is not  in acute distress.     Appearance: He is well-developed. He is not diaphoretic.   HENT:      Right Ear: Ear canal normal. Tympanic membrane is not injected.      Left Ear: Ear canal normal. Tympanic membrane is not injected.      Nose: Nose normal.      Mouth/Throat:      Pharynx: No oropharyngeal exudate.   Eyes:      Conjunctiva/sclera: Conjunctivae normal.      Pupils: Pupils are equal, round, and reactive to light.   Neck:      Thyroid: No thyromegaly.   Cardiovascular:      Rate and Rhythm: Normal rate and regular rhythm.      Pulses: no weak pulses.           Dorsalis pedis pulses are 2+ on the right side and 2+ on the left side.        Posterior tibial pulses are 2+ on the right side and 2+ on the left side.      Heart sounds: Normal heart sounds. No murmur heard.  Pulmonary:      Effort: Pulmonary effort is normal. No respiratory distress.      Breath sounds: Normal breath sounds. No wheezing.   Abdominal:      General: Bowel sounds are normal.      Palpations: Abdomen is soft. There is no hepatomegaly, splenomegaly or mass.      Tenderness: There is no abdominal tenderness.   Musculoskeletal:         General: No tenderness or deformity. Normal range of motion.      Cervical back: Normal range of motion and neck supple.   Feet:      Right foot:      Skin integrity: No ulcer, skin breakdown, erythema, warmth, callus or dry skin.      Left foot:      Skin integrity: No ulcer, skin breakdown, erythema, warmth, callus or dry skin.   Skin:     General: Skin is warm and dry.      Coloration: Skin is not pale.      Findings: No rash.   Neurological:      Mental Status: He is alert and oriented to person, place, and time. He is not disoriented.      Sensory: No sensory deficit.      Gait: Gait normal.   Psychiatric:         Speech: Speech normal.         Behavior: Behavior normal.       Patient's shoes and socks removed.    Right Foot/Ankle   Right Foot Inspection  Skin Exam: skin normal and skin intact. No dry  skin, no warmth, no callus, no erythema, no maceration, no abnormal color, no pre-ulcer, no ulcer and no callus.     Toe Exam: ROM and strength within normal limits.     Sensory   Vibration: intact  Proprioception: intact  Monofilament testing: intact    Vascular  Capillary refills: < 3 seconds  The right DP pulse is 2+. The right PT pulse is 2+.     Left Foot/Ankle  Left Foot Inspection  Skin Exam: skin normal and skin intact. No dry skin, no warmth, no erythema, no maceration, normal color, no pre-ulcer, no ulcer and no callus.     Toe Exam: ROM and strength within normal limits.     Sensory   Vibration: intact  Proprioception: intact  Monofilament testing: intact    Vascular  Capillary refills: < 3 seconds  The left DP pulse is 2+. The left PT pulse is 2+.     Assign Risk Category  No deformity present  No loss of protective sensation  No weak pulses  Risk: 0

## 2024-02-28 ENCOUNTER — TELEPHONE (OUTPATIENT)
Dept: ADMINISTRATIVE | Facility: OTHER | Age: 59
End: 2024-02-28

## 2024-02-28 NOTE — TELEPHONE ENCOUNTER
----- Message from Karlie Chaidez sent at 2/27/2024  3:02 PM EST -----  Patient had eye exam at [facility].

## 2024-02-28 NOTE — LETTER
Diabetic Eye Exam Form    Date Requested: 24  Patient: Garry Tate  Patient : 1965   Referring Provider: Charly Harris MD      DIABETIC Eye Exam Date _______________________________      Type of Exam MUST be documented for Diabetic Eye Exams. Please CHECK ONE.     Retinal Exam       Dilated Retinal Exam       OCT       Optomap-Iris Exam      Fundus Photography       Left Eye - Please check Retinopathy or No Retinopathy        Exam did show retinopathy    Exam did not show retinopathy       Right Eye - Please check Retinopathy or No Retinopathy       Exam did show retinopathy    Exam did not show retinopathy       Comments __________________________________________________________    Practice Providing Exam ______________________________________________    Exam Performed By (print name) _______________________________________      Provider Signature ___________________________________________________      These reports are needed for  compliance.  Please fax this completed form and a copy of the Diabetic Eye Exam report to our office located at [address] [place] [address] as soon as possible via Fax [phone] attention Moriah: Phone [phone]  We thank you for your assistance in treating our mutual patient.

## 2024-02-28 NOTE — TELEPHONE ENCOUNTER
Upon review of the In Basket request and the patient's chart, initial outreach has been made via fax to facility. Please see Contacts section for details.     Thank you  Moriah Geiger

## 2024-02-29 NOTE — TELEPHONE ENCOUNTER
Upon review of the In Basket request we have found as a result of outreach that patient did not have the requested item(s) completed.  11-20-21 was the last one.    Any additional questions or concerns should be emailed to the Practice Liaisons via the appropriate education email address, please do not reply via In Basket.    Thank you  Moriah Geiger

## 2024-04-02 DIAGNOSIS — E11.00 TYPE 2 DIABETES MELLITUS WITH HYPEROSMOLARITY WITHOUT COMA, WITHOUT LONG-TERM CURRENT USE OF INSULIN (HCC): Chronic | ICD-10-CM

## 2024-04-02 NOTE — TELEPHONE ENCOUNTER
Pt was previously taking the 7mg for a month and having no side effects. He wants to step up to the 14mg.

## 2024-04-03 NOTE — TELEPHONE ENCOUNTER
He was only taking one pill.(7mg). Now he wants the 14mg. When I sent you the refill I changed the order to 14mg. Past script was was only to take 1 tab

## 2024-04-04 DIAGNOSIS — E11.65 HYPERGLYCEMIA DUE TO TYPE 2 DIABETES MELLITUS (HCC): ICD-10-CM

## 2024-04-04 RX ORDER — LANCETS 33 GAUGE
EACH MISCELLANEOUS
Qty: 100 EACH | Refills: 0 | Status: SHIPPED | OUTPATIENT
Start: 2024-04-04

## 2024-04-04 RX ORDER — BLOOD SUGAR DIAGNOSTIC
STRIP MISCELLANEOUS
Qty: 100 STRIP | Refills: 0 | Status: SHIPPED | OUTPATIENT
Start: 2024-04-04

## 2024-04-11 ENCOUNTER — TELEPHONE (OUTPATIENT)
Dept: FAMILY MEDICINE CLINIC | Facility: CLINIC | Age: 59
End: 2024-04-11

## 2024-04-11 NOTE — TELEPHONE ENCOUNTER
"Call from patients SOAlexus.  Requesting a \"Medical Exception\" for [facility] to not shut their power off.  I asked what the reason was that power was needed and she said \"he has a nebulizer\"    Told her this would be reviewed and a return call with answer.    Chart reviewed, patient has not had any nebulizer meds ordered since 2019-- and they were ordered thru the ER x1 and thru Pulmonology x2     Note sent to Dr. Harris to review.    I called Alexus MYERS,  back and told her Dr. Harris has agreed to a 1x form from PP.    I gave her our Fax # for [facility] to fax the form to our office.  We do not write letters.  "

## 2024-04-18 DIAGNOSIS — J40 BRONCHITIS: ICD-10-CM

## 2024-04-18 RX ORDER — ALBUTEROL SULFATE 90 UG/1
2 AEROSOL, METERED RESPIRATORY (INHALATION) EVERY 4 HOURS PRN
Qty: 18 G | Refills: 0 | Status: SHIPPED | OUTPATIENT
Start: 2024-04-18

## 2024-05-01 DIAGNOSIS — E11.65 HYPERGLYCEMIA DUE TO TYPE 2 DIABETES MELLITUS (HCC): ICD-10-CM

## 2024-05-02 DIAGNOSIS — E11.65 HYPERGLYCEMIA DUE TO TYPE 2 DIABETES MELLITUS (HCC): ICD-10-CM

## 2024-05-02 RX ORDER — BLOOD SUGAR DIAGNOSTIC
STRIP MISCELLANEOUS
Qty: 300 STRIP | Refills: 1 | Status: SHIPPED | OUTPATIENT
Start: 2024-05-02

## 2024-05-03 RX ORDER — PEN NEEDLE, DIABETIC 32GX 5/32"
NEEDLE, DISPOSABLE MISCELLANEOUS
Qty: 100 EACH | Refills: 5 | Status: SHIPPED | OUTPATIENT
Start: 2024-05-03

## 2024-05-09 ENCOUNTER — TELEPHONE (OUTPATIENT)
Age: 59
End: 2024-05-09

## 2024-05-09 ENCOUNTER — TELEPHONE (OUTPATIENT)
Dept: FAMILY MEDICINE CLINIC | Facility: CLINIC | Age: 59
End: 2024-05-09

## 2024-05-09 DIAGNOSIS — E11.00 TYPE 2 DIABETES MELLITUS WITH HYPEROSMOLARITY WITHOUT COMA, WITHOUT LONG-TERM CURRENT USE OF INSULIN (HCC): Chronic | ICD-10-CM

## 2024-05-09 RX ORDER — ORAL SEMAGLUTIDE 14 MG/1
14 TABLET ORAL
Qty: 90 TABLET | Refills: 2 | OUTPATIENT
Start: 2024-05-09

## 2024-05-09 NOTE — TELEPHONE ENCOUNTER
Pts friend came into the office, mentioned that they needed a refill of the rybelsus. Mentioned he is supposed to be taking 14 mg and not 7.     [facility] [place] [phone].

## 2024-05-09 NOTE — TELEPHONE ENCOUNTER
Brain called saying that the pharmacy told him the rybelsus needs prior authorizatuion. Please process. Thank you

## 2024-05-12 DIAGNOSIS — J40 BRONCHITIS: ICD-10-CM

## 2024-05-13 RX ORDER — ALBUTEROL SULFATE 90 UG/1
AEROSOL, METERED RESPIRATORY (INHALATION)
Qty: 6.7 G | Refills: 5 | Status: SHIPPED | OUTPATIENT
Start: 2024-05-13

## 2024-05-13 NOTE — TELEPHONE ENCOUNTER
PA for RYBELSUS 14 MG     Submitted via    [x]CMM-KEY [id number]  []MozendascSesamea-Case ID #   []Faxed to plan   []Other website   []Phone call Case ID #     Office notes sent, clinical questions answered. Awaiting determination    Turnaround time for your insurance to make a decision on your Prior Authorization can take 7-21 business days.

## 2024-05-15 NOTE — TELEPHONE ENCOUNTER
PA for Rybelsus  cancelled due to     [x]Approval on file-dates approved 3/11/2024-3/11/2027  - override submitted for dose increas  []Medication already on Formulary  []Brand Name Preferred  []Patient no longer covered by insurance    Patient advised by     [x]My Chart Message  []Phone call    Message sent to office clinical pool   Yes    Scanned into Media  no

## 2024-05-15 NOTE — TELEPHONE ENCOUNTER
Medication was refused because the problem Diabetes is not on Pt. List of problems.  Pt. Stated that he was told to take two 7 mgs. Tablets daily that is why he totally ran out of medication week ago. Dr. Galindo prescribed him 14 mgs. Of  Semaglutide 14 mg Oral Daily before breakfast  Please advise the Pt. Accordingly.  Thank you!!

## 2024-05-17 NOTE — TELEPHONE ENCOUNTER
Patient was calling in regards to the update on this medication that needed to be changed to 14mg. Patient hasn't had this medication for awhile now.     Patient would like to have this sent over to preferred pharmacy on file as soon as possible. Please advise back to patient once script has been sent over.

## 2024-05-22 ENCOUNTER — TELEPHONE (OUTPATIENT)
Age: 59
End: 2024-05-22

## 2024-05-22 DIAGNOSIS — M17.12 OSTEOARTHRITIS OF LEFT KNEE, UNSPECIFIED OSTEOARTHRITIS TYPE: Primary | ICD-10-CM

## 2024-05-22 DIAGNOSIS — M17.11 OSTEOARTHRITIS OF RIGHT KNEE, UNSPECIFIED OSTEOARTHRITIS TYPE: ICD-10-CM

## 2024-05-22 NOTE — TELEPHONE ENCOUNTER
Chart reviewed -- patient has a documented history of b/l knee osteoarthritis and a hx of multi series of  Synvisc / OrthoVIsc injections.    Last Ortho note 12/20/18 by Dr. Peña has noted b/l knee xrays showed:  severe medial and patellofemoral compartment osteoarthritis and moderate lateral compartment osteoarthritis.       Spoke with patient who wants to see an ortho in [place]  and states they will get him in sooner if he has xrays in advance.    Order placed for b/l knee xrays

## 2024-05-22 NOTE — TELEPHONE ENCOUNTER
Patient is seeking Knee Specialist. Specialist is requesting XRAYS be done first of both knees sinces it's been more than 2 years before seeing him. Patient requesting if XRAY orders for both knees can be placed so he can see the specialist. Please advise patient. Thank you .

## 2024-05-23 DIAGNOSIS — E78.2 MIXED HYPERLIPIDEMIA: Chronic | ICD-10-CM

## 2024-05-23 RX ORDER — ATORVASTATIN CALCIUM 20 MG/1
20 TABLET, FILM COATED ORAL DAILY
Qty: 90 TABLET | Refills: 1 | Status: SHIPPED | OUTPATIENT
Start: 2024-05-23

## 2024-05-28 ENCOUNTER — HOSPITAL ENCOUNTER (OUTPATIENT)
Dept: RADIOLOGY | Facility: HOSPITAL | Age: 59
Discharge: HOME | End: 2024-05-28
Payer: COMMERCIAL

## 2024-05-28 ENCOUNTER — TELEPHONE (OUTPATIENT)
Age: 59
End: 2024-05-28

## 2024-05-28 DIAGNOSIS — M17.11 OSTEOARTHRITIS OF RIGHT KNEE, UNSPECIFIED OSTEOARTHRITIS TYPE: ICD-10-CM

## 2024-05-28 DIAGNOSIS — M17.12 OSTEOARTHRITIS OF LEFT KNEE, UNSPECIFIED OSTEOARTHRITIS TYPE: ICD-10-CM

## 2024-05-28 DIAGNOSIS — E11.00 TYPE 2 DIABETES MELLITUS WITH HYPEROSMOLARITY WITHOUT COMA, WITHOUT LONG-TERM CURRENT USE OF INSULIN (HCC): Chronic | ICD-10-CM

## 2024-05-28 PROCEDURE — 73562 X-RAY EXAM OF KNEE 3: CPT

## 2024-05-28 NOTE — TELEPHONE ENCOUNTER
Patient called to see if he could get an alternative medication to semaglutide (Rybelsus) 14 MG tablet  for his diabetes. Medication is on hold and he has not been able to get. Patient confirmed pharmacy - [id number] [place] [place] - [address] . Please advise

## 2024-05-29 DIAGNOSIS — E11.00 TYPE 2 DIABETES MELLITUS WITH HYPEROSMOLARITY WITHOUT COMA, WITHOUT LONG-TERM CURRENT USE OF INSULIN (HCC): Primary | ICD-10-CM

## 2024-05-29 RX ORDER — DULAGLUTIDE 0.75 MG/.5ML
0.75 INJECTION, SOLUTION SUBCUTANEOUS
Qty: 6 ML | Refills: 1 | Status: SHIPPED | OUTPATIENT
Start: 2024-05-29

## 2024-05-30 NOTE — TELEPHONE ENCOUNTER
Patient calling on recommendation on how to take the lower dose Trulicity. Should he just take the .75 or take 2 pens to get his higher dose. Please advise.

## 2024-05-31 ENCOUNTER — TELEPHONE (OUTPATIENT)
Age: 59
End: 2024-05-31

## 2024-05-31 DIAGNOSIS — E11.00 TYPE 2 DIABETES MELLITUS WITH HYPEROSMOLARITY WITHOUT COMA, WITHOUT LONG-TERM CURRENT USE OF INSULIN (HCC): ICD-10-CM

## 2024-05-31 DIAGNOSIS — E08.00 DIABETES MELLITUS DUE TO UNDERLYING CONDITION WITH HYPEROSMOLARITY WITHOUT COMA, WITHOUT LONG-TERM CURRENT USE OF INSULIN (HCC): Primary | ICD-10-CM

## 2024-05-31 RX ORDER — DULAGLUTIDE 0.75 MG/.5ML
0.75 INJECTION, SOLUTION SUBCUTANEOUS
Qty: 6 ML | Refills: 1 | Status: CANCELLED | OUTPATIENT
Start: 2024-05-31

## 2024-05-31 RX ORDER — DULAGLUTIDE 0.75 MG/.5ML
1.5 INJECTION, SOLUTION SUBCUTANEOUS
Qty: 12 ML | Refills: 1 | Status: SHIPPED | OUTPATIENT
Start: 2024-05-31

## 2024-05-31 NOTE — TELEPHONE ENCOUNTER
Patient called stating that he had contacted insurance company and they will cover the other two  .75 ml shots of Trulicity.  Patient would like new prescription called into pharmacy. Please advise.

## 2024-06-09 DIAGNOSIS — E08.00 DIABETES MELLITUS DUE TO UNDERLYING CONDITION WITH HYPEROSMOLARITY WITHOUT COMA, WITHOUT LONG-TERM CURRENT USE OF INSULIN (HCC): ICD-10-CM

## 2024-06-09 DIAGNOSIS — E11.65 HYPERGLYCEMIA DUE TO TYPE 2 DIABETES MELLITUS (HCC): ICD-10-CM

## 2024-06-09 DIAGNOSIS — E11.00 TYPE 2 DIABETES MELLITUS WITH HYPEROSMOLARITY WITHOUT COMA, WITHOUT LONG-TERM CURRENT USE OF INSULIN (HCC): ICD-10-CM

## 2024-06-09 RX ORDER — DULAGLUTIDE 0.75 MG/.5ML
1.5 INJECTION, SOLUTION SUBCUTANEOUS
Qty: 12 ML | Refills: 0 | OUTPATIENT
Start: 2024-06-09

## 2024-06-09 RX ORDER — DULAGLUTIDE 0.75 MG/.5ML
0.75 INJECTION, SOLUTION SUBCUTANEOUS
Qty: 6 ML | Refills: 0 | OUTPATIENT
Start: 2024-06-09

## 2024-06-09 RX ORDER — PEN NEEDLE, DIABETIC 32GX 5/32"
NEEDLE, DISPOSABLE MISCELLANEOUS
Qty: 100 EACH | Refills: 1 | Status: SHIPPED | OUTPATIENT
Start: 2024-06-09

## 2024-06-26 ENCOUNTER — OFFICE VISIT (OUTPATIENT)
Dept: FAMILY MEDICINE CLINIC | Facility: CLINIC | Age: 59
End: 2024-06-26
Payer: COMMERCIAL

## 2024-06-26 VITALS
OXYGEN SATURATION: 93 % | TEMPERATURE: 97.3 F | BODY MASS INDEX: 44.1 KG/M2 | HEART RATE: 100 BPM | WEIGHT: 315 LBS | HEIGHT: 71 IN

## 2024-06-26 DIAGNOSIS — R60.0 EDEMA OF BOTH LEGS: ICD-10-CM

## 2024-06-26 DIAGNOSIS — L03.116 LEFT LEG CELLULITIS: ICD-10-CM

## 2024-06-26 DIAGNOSIS — E11.00 TYPE 2 DIABETES MELLITUS WITH HYPEROSMOLARITY WITHOUT COMA, WITHOUT LONG-TERM CURRENT USE OF INSULIN (HCC): Primary | ICD-10-CM

## 2024-06-26 DIAGNOSIS — M19.049 OSTEOARTHRITIS OF HAND, UNSPECIFIED LATERALITY, UNSPECIFIED OSTEOARTHRITIS TYPE: ICD-10-CM

## 2024-06-26 DIAGNOSIS — I10 PRIMARY HYPERTENSION: Chronic | ICD-10-CM

## 2024-06-26 DIAGNOSIS — N40.1 BENIGN PROSTATIC HYPERPLASIA WITH LOWER URINARY TRACT SYMPTOMS, SYMPTOM DETAILS UNSPECIFIED: ICD-10-CM

## 2024-06-26 LAB
LEFT EYE DIABETIC RETINOPATHY: NORMAL
LEFT EYE IMAGE QUALITY: NORMAL
LEFT EYE MACULAR EDEMA: NORMAL
LEFT EYE OTHER RETINOPATHY: NORMAL
RIGHT EYE DIABETIC RETINOPATHY: NORMAL
RIGHT EYE IMAGE QUALITY: NORMAL
RIGHT EYE MACULAR EDEMA: NORMAL
RIGHT EYE OTHER RETINOPATHY: NORMAL
SEVERITY (EYE EXAM): NORMAL

## 2024-06-26 PROCEDURE — 99214 OFFICE O/P EST MOD 30 MIN: CPT

## 2024-06-26 RX ORDER — CEPHALEXIN 500 MG/1
500 CAPSULE ORAL 2 TIMES DAILY
Qty: 20 CAPSULE | Refills: 0 | Status: SHIPPED | OUTPATIENT
Start: 2024-06-26 | End: 2024-07-06

## 2024-06-26 RX ORDER — TAMSULOSIN HYDROCHLORIDE 0.4 MG/1
0.4 CAPSULE ORAL
Qty: 90 CAPSULE | Refills: 3 | Status: SHIPPED | OUTPATIENT
Start: 2024-06-26

## 2024-06-26 RX ORDER — TORSEMIDE 20 MG/1
20 TABLET ORAL DAILY
Qty: 100 TABLET | Refills: 3 | Status: SHIPPED | OUTPATIENT
Start: 2024-06-26

## 2024-06-26 NOTE — ASSESSMENT & PLAN NOTE
Reports increased swelling in BLLE but left worse than right. Left leg erythematous and warm to the touch. Keflex prescribed.    Encouraged daily weights with log. Take torsemide daily with weight gain of 2-3 lb/day or 5 lb in a week until back to baseline weight. Continue DWTs and repeat cycle as needed.     Encouraged elevation, compression, low salt diet, and walking. Follow up 1 month.

## 2024-06-26 NOTE — ASSESSMENT & PLAN NOTE
A1C due in Aug. States that he was unable to get his trulicity for several weeks. Trulicity 1.5 mg ordered. Iris exam completed today.   Lab Results   Component Value Date    HGBA1C 6.9 (H) 02/22/2024

## 2024-06-26 NOTE — ASSESSMENT & PLAN NOTE
/78. Patient encouraged to monitor at home. Goal <140/90. Patient to follow up 1 month. If trends consistently >140/90 will need to adjust meds.

## 2024-06-26 NOTE — PROGRESS NOTES
Ambulatory Visit  Name: Garry Tate      : 1965      MRN: [id number]  Encounter Provider: LISA Menendez  Encounter Date: 2024   Encounter department: [facility]    Assessment & Plan   1. Type 2 diabetes mellitus with hyperosmolarity without coma, without long-term current use of insulin (HCC)  Assessment & Plan:  A1C due in Aug. States that he was unable to get his trulicity for several weeks. Trulicity 1.5 mg ordered. Iris exam completed today.   Lab Results   Component Value Date    HGBA1C 6.9 (H) 2024     Orders:  -     dulaglutide (Trulicity) 1.5 MG/0.5ML injection; Inject 0.5 mL (1.5 mg total) under the skin every 7 days  -     IRIS Diabetic eye exam  2. Left leg cellulitis  -     cephalexin (KEFLEX) 500 mg capsule; Take 1 capsule (500 mg total) by mouth 2 (two) times a day for 10 days  3. Benign prostatic hyperplasia with lower urinary tract symptoms, symptom details unspecified  -     tamsulosin (FLOMAX) 0.4 mg; Take 1 capsule (0.4 mg total) by mouth daily with dinner  4. Edema of both legs  Assessment & Plan:  Reports increased swelling in BLLE but left worse than right. Left leg erythematous and warm to the touch. Keflex prescribed.    Encouraged daily weights with log. Take torsemide daily with weight gain of 2-3 lb/day or 5 lb in a week until back to baseline weight. Continue DWTs and repeat cycle as needed.     Encouraged elevation, compression, low salt diet, and walking. Follow up 1 month.   Orders:  -     torsemide (DEMADEX) 20 mg tablet; Take 1 tablet (20 mg total) by mouth daily  5. Osteoarthritis of hand, unspecified laterality, unspecified osteoarthritis type  -     Diclofenac Sodium (VOLTAREN) 1 %; Apply 2 g topically 4 (four) times a day  6. Primary hypertension  Assessment & Plan:  /78. Patient encouraged to monitor at home. Goal <140/90. Patient to follow up 1 month. If trends consistently >140/90 will need to adjust  "meds.       Depression Screening and Follow-up Plan: Patient was screened for depression during today's encounter. They screened negative with a PHQ-2 score of 0.      History of Present Illness     Diabetes  He has type 2 diabetes mellitus. Pertinent negatives for hypoglycemia include no confusion, dizziness, headaches, hunger, mood changes, nervousness/anxiousness, pallor, seizures, sleepiness, speech difficulty, sweats or tremors. Pertinent negatives for diabetes include no blurred vision, no chest pain, no fatigue, no foot paresthesias, no foot ulcerations, no polydipsia, no polyphagia, no polyuria, no visual change, no weakness and no weight loss. Risk factors for coronary artery disease include hypertension, male sex, obesity, dyslipidemia and diabetes mellitus. He is compliant with treatment most of the time.       Review of Systems   Constitutional:  Negative for activity change, fatigue, fever and weight loss.   HENT:  Negative for congestion, ear pain, rhinorrhea and sore throat.    Eyes:  Negative for blurred vision and pain.   Respiratory:  Negative for cough, shortness of breath and wheezing.    Cardiovascular:  Positive for leg swelling. Negative for chest pain.   Gastrointestinal:  Negative for abdominal pain, diarrhea, nausea and vomiting.   Endocrine: Negative for polydipsia, polyphagia and polyuria.   Genitourinary:         Incomplete emptying, hesitancy    Musculoskeletal:  Positive for arthralgias. Negative for myalgias.   Skin:  Negative for pallor and rash.   Neurological:  Negative for dizziness, tremors, seizures, speech difficulty, weakness, numbness and headaches.   Psychiatric/Behavioral:  Negative for confusion. The patient is not nervous/anxious.    All other systems reviewed and are negative.      Objective     Pulse 100   Temp (!) 97.3 °F (36.3 °C) (Tympanic)   Ht 5' 11\" (1.803 m)   Wt (!) 171 kg (378 lb)   SpO2 93%   BMI 52.72 kg/m²     Physical Exam  Vitals and nursing note " reviewed.   Constitutional:       General: He is not in acute distress.     Appearance: He is well-developed.   HENT:      Head: Normocephalic and atraumatic.   Eyes:      Conjunctiva/sclera: Conjunctivae normal.   Cardiovascular:      Rate and Rhythm: Normal rate and regular rhythm.      Heart sounds: No murmur heard.  Pulmonary:      Effort: Pulmonary effort is normal. No respiratory distress.      Breath sounds: Normal breath sounds.   Abdominal:      Palpations: Abdomen is soft.      Tenderness: There is no abdominal tenderness.   Musculoskeletal:      Cervical back: Neck supple.      Right lower leg: Edema present.      Left lower leg: Edema present.   Skin:     General: Skin is warm and dry.      Capillary Refill: Capillary refill takes less than 2 seconds.      Findings: Erythema present.   Neurological:      Mental Status: He is alert and oriented to person, place, and time. Mental status is at baseline.   Psychiatric:         Mood and Affect: Mood normal.       Administrative Statements   I have spent a total time of 30 minutes on 06/26/24 In caring for this patient including Risks and benefits of tx options, Instructions for management, Patient and family education, Importance of tx compliance, Risk factor reductions, Impressions, Documenting in the medical record, Reviewing / ordering tests, medicine, procedures  , and Obtaining or reviewing history  .

## 2024-07-05 DIAGNOSIS — E11.65 TYPE 2 DIABETES MELLITUS WITH HYPERGLYCEMIA (HCC): ICD-10-CM

## 2024-07-06 NOTE — TELEPHONE ENCOUNTER
Refill must be reviewed and completed by the office or provider. The refill is unable to be approved or denied by the medication management team.      Not on active medication list - Please review to see if the refill is appropriate.

## 2024-07-07 DIAGNOSIS — E11.65 HYPERGLYCEMIA DUE TO TYPE 2 DIABETES MELLITUS (HCC): ICD-10-CM

## 2024-07-08 RX ORDER — INSULIN LISPRO 100 [IU]/ML
26 INJECTION, SOLUTION INTRAVENOUS; SUBCUTANEOUS
Qty: 30 ML | Refills: 5 | Status: SHIPPED | OUTPATIENT
Start: 2024-07-08

## 2024-07-08 RX ORDER — INSULIN ASPART 100 [IU]/ML
INJECTION, SOLUTION INTRAVENOUS; SUBCUTANEOUS
Refills: 10 | OUTPATIENT
Start: 2024-07-08

## 2024-07-23 DIAGNOSIS — E03.9 ACQUIRED HYPOTHYROIDISM: ICD-10-CM

## 2024-07-23 RX ORDER — LISINOPRIL 40 MG/1
40 TABLET ORAL DAILY
Qty: 100 TABLET | Refills: 1 | Status: SHIPPED | OUTPATIENT
Start: 2024-07-23

## 2024-07-29 ENCOUNTER — TELEPHONE (OUTPATIENT)
Dept: FAMILY MEDICINE CLINIC | Facility: CLINIC | Age: 59
End: 2024-07-29

## 2024-08-09 ENCOUNTER — OFFICE VISIT (OUTPATIENT)
Dept: FAMILY MEDICINE CLINIC | Facility: CLINIC | Age: 59
End: 2024-08-09
Payer: COMMERCIAL

## 2024-08-09 VITALS
WEIGHT: 315 LBS | SYSTOLIC BLOOD PRESSURE: 130 MMHG | BODY MASS INDEX: 44.1 KG/M2 | OXYGEN SATURATION: 95 % | DIASTOLIC BLOOD PRESSURE: 78 MMHG | HEIGHT: 71 IN | TEMPERATURE: 97.9 F | HEART RATE: 94 BPM

## 2024-08-09 DIAGNOSIS — M10.9 GOUT, UNSPECIFIED CAUSE, UNSPECIFIED CHRONICITY, UNSPECIFIED SITE: ICD-10-CM

## 2024-08-09 DIAGNOSIS — E11.00 TYPE 2 DIABETES MELLITUS WITH HYPEROSMOLARITY WITHOUT COMA, WITHOUT LONG-TERM CURRENT USE OF INSULIN (HCC): Primary | ICD-10-CM

## 2024-08-09 DIAGNOSIS — I10 PRIMARY HYPERTENSION: Chronic | ICD-10-CM

## 2024-08-09 DIAGNOSIS — Z12.11 SCREEN FOR COLON CANCER: ICD-10-CM

## 2024-08-09 DIAGNOSIS — E78.2 MIXED HYPERLIPIDEMIA: Chronic | ICD-10-CM

## 2024-08-09 DIAGNOSIS — Z12.5 SCREENING FOR PROSTATE CANCER: ICD-10-CM

## 2024-08-09 DIAGNOSIS — R60.0 EDEMA OF BOTH LEGS: ICD-10-CM

## 2024-08-09 LAB — SL AMB POCT HEMOGLOBIN AIC: 7.7 (ref ?–6.5)

## 2024-08-09 PROCEDURE — 83036 HEMOGLOBIN GLYCOSYLATED A1C: CPT

## 2024-08-09 PROCEDURE — 99214 OFFICE O/P EST MOD 30 MIN: CPT

## 2024-08-09 NOTE — PROGRESS NOTES
"Ambulatory Visit  Name: Garry Tate      : 1965      MRN: [id number]  Encounter Provider: LISA Menendez  Encounter Date: 2024   Encounter department: [facility]    Assessment & Plan   1. Type 2 diabetes mellitus with hyperosmolarity without coma, without long-term current use of insulin (HCC)  Assessment & Plan:  A1C worsening. Patient admits to poor diet. Encouraged healthy diet, increased physical activity, and weight loss. BMI >53. Recheck A1C 3-6 months. Increased trulicity to 3 mg. Microalbumin sent  Lab Results   Component Value Date    HGBA1C 7.7 (A) 2024     Orders:  -     POCT hemoglobin A1c  -     Albumin / creatinine urine ratio  -     dulaglutide (Trulicity) 3 MG/0.5ML injection; Inject 0.5 mL (3 mg total) under the skin every 7 days Do not start before 2024.  -     CBC and differential; Future  -     Comprehensive metabolic panel; Future  -     CBC and differential  -     Comprehensive metabolic panel  2. Screen for colon cancer  -     Cologuard  3. Mixed hyperlipidemia  Assessment & Plan:  Trend lipid panel. Continue current dose of atorvastatin until labs rec'd.   Orders:  -     Lipid Panel with Direct LDL reflex; Future  -     Lipid Panel with Direct LDL reflex  4. Screening for prostate cancer  -     PSA Total (Reflex To Free); Future  -     PSA Total (Reflex To Free)  5. Gout, unspecified cause, unspecified chronicity, unspecified site  Assessment & Plan:  Check uric acid levels. Continue current dose of allopurinol until labs rec'd.   Orders:  -     Uric acid; Future  -     Uric acid  6. Primary hypertension  Assessment & Plan:  BP stable. Continue current medication regimen.   7. Edema of both legs  Assessment & Plan:  Continues with BLLE. Reports slight improvement with diuretic. States, \"I'm definitely peeing a lot.\" Patient is not wearing compression stockings, elevating, watching sodium intake, or walking when able. " Patient states that compression stockings previously made edema worse on feet. Encouraged to wear stockings that cover feet as well. Patient verbalized understanding. Activity difficult right now due to bilateral knee pain. 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 Encouraged to elevate when possible to alleviate dependent edema.        History of Present Illness     Diabetes  He presents for his follow-up diabetic visit. He has type 2 diabetes mellitus. His disease course has been worsening. Pertinent negatives for hypoglycemia include no confusion, dizziness, headaches, hunger, mood changes, nervousness/anxiousness, pallor, seizures, sleepiness, speech difficulty, sweats or tremors. Pertinent negatives for diabetes include no blurred vision, no chest pain, no fatigue, no foot ulcerations, no polydipsia, no polyphagia, no polyuria, no visual change, no weakness and no weight loss. Risk factors for coronary artery disease include diabetes mellitus, dyslipidemia, male sex, hypertension, obesity and sedentary lifestyle.       Review of Systems   Constitutional:  Negative for activity change, fatigue, fever and weight loss.   HENT:  Negative for congestion, ear pain, rhinorrhea and sore throat.    Eyes:  Negative for blurred vision and pain.   Respiratory:  Negative for cough, shortness of breath and wheezing.    Cardiovascular:  Positive for leg swelling. Negative for chest pain and palpitations.   Gastrointestinal:  Negative for abdominal pain, diarrhea, nausea and vomiting.   Endocrine: Negative for polydipsia, polyphagia and polyuria.   Musculoskeletal:  Positive for arthralgias and myalgias.   Skin:  Negative for pallor and rash.   Neurological:  Negative for dizziness, tremors, seizures, speech difficulty, weakness, numbness and headaches.   Psychiatric/Behavioral:  Negative for confusion and dysphoric mood. The patient is not nervous/anxious.    All other systems reviewed and are  "negative.      Objective     /78   Pulse 94   Temp 97.9 °F (36.6 °C)   Ht 5' 11\" (1.803 m)   Wt (!) 174 kg (384 lb 9.6 oz)   SpO2 95%   BMI 53.64 kg/m²     Physical Exam  Vitals and nursing note reviewed.   Constitutional:       General: He is not in acute distress.     Appearance: Normal appearance. He is well-developed. He is obese. He is not ill-appearing or diaphoretic.   HENT:      Head: Normocephalic and atraumatic.      Right Ear: External ear normal.      Left Ear: External ear normal.   Cardiovascular:      Rate and Rhythm: Normal rate and regular rhythm.      Heart sounds: Normal heart sounds. No murmur heard.  Pulmonary:      Effort: Pulmonary effort is normal. No respiratory distress.      Breath sounds: Normal breath sounds. No wheezing.   Abdominal:      General: Bowel sounds are normal. There is no distension.      Palpations: Abdomen is soft.      Tenderness: There is no abdominal tenderness.   Musculoskeletal:      Cervical back: Neck supple.   Skin:     General: Skin is warm and dry.      Capillary Refill: Capillary refill takes less than 2 seconds.   Neurological:      Mental Status: He is alert and oriented to person, place, and time. Mental status is at baseline.   Psychiatric:         Mood and Affect: Mood normal.         Behavior: Behavior normal.         Thought Content: Thought content normal.         Judgment: Judgment normal.       Administrative Statements           "

## 2024-08-09 NOTE — ASSESSMENT & PLAN NOTE
"Continues with BLLE. Reports slight improvement with diuretic. States, \"I'm definitely peeing a lot.\" Patient is not wearing compression stockings, elevating, watching sodium intake, or walking when able. Patient states that compression stockings previously made edema worse on feet. Encouraged to wear stockings that cover feet as well. Patient verbalized understanding. Activity difficult right now due to bilateral knee pain. 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 Encouraged to elevate when possible to alleviate dependent edema.   "

## 2024-08-09 NOTE — ASSESSMENT & PLAN NOTE
A1C worsening. Patient admits to poor diet. Encouraged healthy diet, increased physical activity, and weight loss. BMI >53. Recheck A1C 3-6 months. Increased trulicity to 3 mg. Microalbumin sent  Lab Results   Component Value Date    HGBA1C 7.7 (A) 08/09/2024

## 2024-08-10 LAB
ALBUMIN/CREAT UR: 8 MG/G CREAT (ref 0–29)
CREAT UR-MCNC: 137.1 MG/DL
MICROALBUMIN UR-MCNC: 11.5 UG/ML

## 2024-08-30 ENCOUNTER — TELEPHONE (OUTPATIENT)
Dept: PAIN MEDICINE | Facility: CLINIC | Age: 59
End: 2024-08-30

## 2024-08-30 NOTE — TELEPHONE ENCOUNTER
Received [facility] request for records via fax    Pt is not considered current Pt due to not completing a consult. Pt was a direct injection referral from Ortho    Faxed injection note from 2019 to [facility] at [phone]

## 2024-09-11 LAB — COLOGUARD RESULT REPORTABLE: NEGATIVE

## 2024-09-29 DIAGNOSIS — R60.0 EDEMA OF BOTH LEGS: ICD-10-CM

## 2024-09-30 RX ORDER — TORSEMIDE 20 MG/1
20 TABLET ORAL DAILY
Qty: 90 TABLET | Refills: 1 | Status: SHIPPED | OUTPATIENT
Start: 2024-09-30

## 2024-10-10 LAB
ALBUMIN/CREAT UR: 12 MG/G CREAT (ref 0–29)
BUN SERPL-MCNC: 17 MG/DL (ref 8–27)
BUN/CREAT SERPL: 22 (ref 10–24)
CALCIUM SERPL-MCNC: 9.7 MG/DL (ref 8.6–10.2)
CHLORIDE SERPL-SCNC: 97 MMOL/L (ref 96–106)
CO2 SERPL-SCNC: 29 MMOL/L (ref 20–29)
CREAT SERPL-MCNC: 0.77 MG/DL (ref 0.76–1.27)
CREAT UR-MCNC: 116.5 MG/DL
EGFR: 102 ML/MIN/1.73
EST. AVERAGE GLUCOSE BLD GHB EST-MCNC: 169 MG/DL
GLUCOSE SERPL-MCNC: 140 MG/DL (ref 70–99)
HBA1C MFR BLD: 7.5 % (ref 4.8–5.6)
MICROALBUMIN UR-MCNC: 13.8 UG/ML
POTASSIUM SERPL-SCNC: 4.9 MMOL/L (ref 3.5–5.2)
SODIUM SERPL-SCNC: 140 MMOL/L (ref 134–144)

## 2024-12-23 ENCOUNTER — HOSPITAL ENCOUNTER (OUTPATIENT)
Dept: RADIOLOGY | Facility: HOSPITAL | Age: 59
Discharge: HOME | End: 2024-12-23
Payer: COMMERCIAL

## 2024-12-23 ENCOUNTER — RESULTS FOLLOW-UP (OUTPATIENT)
Dept: FAMILY MEDICINE CLINIC | Facility: CLINIC | Age: 59
End: 2024-12-23

## 2024-12-23 ENCOUNTER — OFFICE VISIT (OUTPATIENT)
Dept: FAMILY MEDICINE CLINIC | Facility: CLINIC | Age: 59
End: 2024-12-23
Payer: COMMERCIAL

## 2024-12-23 VITALS
SYSTOLIC BLOOD PRESSURE: 162 MMHG | HEART RATE: 102 BPM | WEIGHT: 315 LBS | BODY MASS INDEX: 56.12 KG/M2 | DIASTOLIC BLOOD PRESSURE: 98 MMHG | OXYGEN SATURATION: 96 % | RESPIRATION RATE: 20 BRPM

## 2024-12-23 DIAGNOSIS — I10 PRIMARY HYPERTENSION: Chronic | ICD-10-CM

## 2024-12-23 DIAGNOSIS — E11.00 TYPE 2 DIABETES MELLITUS WITH HYPEROSMOLARITY WITHOUT COMA, WITHOUT LONG-TERM CURRENT USE OF INSULIN (HCC): ICD-10-CM

## 2024-12-23 DIAGNOSIS — R60.0 BILATERAL LEG EDEMA: Primary | ICD-10-CM

## 2024-12-23 DIAGNOSIS — R06.02 SOB (SHORTNESS OF BREATH): ICD-10-CM

## 2024-12-23 DIAGNOSIS — R60.0 BILATERAL LEG EDEMA: ICD-10-CM

## 2024-12-23 PROBLEM — F11.20 OPIOID DEPENDENCE, UNCOMPLICATED (HCC): Status: ACTIVE | Noted: 2024-12-23

## 2024-12-23 PROBLEM — F11.20 OPIOID DEPENDENCE, UNCOMPLICATED (HCC): Status: RESOLVED | Noted: 2024-12-23 | Resolved: 2024-12-23

## 2024-12-23 PROCEDURE — 71046 X-RAY EXAM CHEST 2 VIEWS: CPT

## 2024-12-23 PROCEDURE — 99214 OFFICE O/P EST MOD 30 MIN: CPT

## 2024-12-23 PROCEDURE — 93000 ELECTROCARDIOGRAM COMPLETE: CPT

## 2024-12-23 RX ORDER — DULAGLUTIDE 4.5 MG/.5ML
4.5 INJECTION, SOLUTION SUBCUTANEOUS WEEKLY
Qty: 6 ML | Refills: 2 | Status: SHIPPED | OUTPATIENT
Start: 2024-12-23

## 2024-12-23 NOTE — ASSESSMENT & PLAN NOTE
BP elevated. Patient reports likely due to anxiety. Patient encouraged to trend BPs at home with log. Goal consistently <140/90. Follow up for med adjustment if trending above goal.

## 2024-12-23 NOTE — ASSESSMENT & PLAN NOTE
A1C slightly improved from 7.7 to 7.5. increase trulicity to 4.5. repeat A1C in 3 months.   Lab Results   Component Value Date    HGBA1C 7.5 (H) 10/09/2024       Orders:    Dulaglutide (Trulicity) 4.5 MG/0.5ML SOAJ; Inject 4.5 mg under the skin once a week

## 2024-12-24 DIAGNOSIS — J40 BRONCHITIS: Primary | ICD-10-CM

## 2024-12-24 LAB
ALBUMIN SERPL-MCNC: 4.6 G/DL (ref 3.8–4.9)
ALP SERPL-CCNC: 108 IU/L (ref 44–121)
ALT SERPL-CCNC: 29 IU/L (ref 0–44)
AST SERPL-CCNC: 24 IU/L (ref 0–40)
BASOPHILS # BLD AUTO: 0.1 X10E3/UL (ref 0–0.2)
BASOPHILS NFR BLD AUTO: 1 %
BILIRUB SERPL-MCNC: 0.5 MG/DL (ref 0–1.2)
BNP SERPL-MCNC: 9.5 PG/ML (ref 0–100)
BUN SERPL-MCNC: 13 MG/DL (ref 8–27)
BUN/CREAT SERPL: 15 (ref 10–24)
CALCIUM SERPL-MCNC: 9.7 MG/DL (ref 8.6–10.2)
CHLORIDE SERPL-SCNC: 100 MMOL/L (ref 96–106)
CO2 SERPL-SCNC: 26 MMOL/L (ref 20–29)
CREAT SERPL-MCNC: 0.85 MG/DL (ref 0.76–1.27)
EGFR: 99 ML/MIN/1.73
EOSINOPHIL # BLD AUTO: 0.3 X10E3/UL (ref 0–0.4)
EOSINOPHIL NFR BLD AUTO: 4 %
ERYTHROCYTE [DISTWIDTH] IN BLOOD BY AUTOMATED COUNT: 12.9 % (ref 11.6–15.4)
GLOBULIN SER-MCNC: 2.8 G/DL (ref 1.5–4.5)
GLUCOSE SERPL-MCNC: 117 MG/DL (ref 70–99)
HCT VFR BLD AUTO: 45.2 % (ref 37.5–51)
HGB BLD-MCNC: 14.9 G/DL (ref 13–17.7)
IMM GRANULOCYTES # BLD: 0 X10E3/UL (ref 0–0.1)
IMM GRANULOCYTES NFR BLD: 0 %
LYMPHOCYTES # BLD AUTO: 1.6 X10E3/UL (ref 0.7–3.1)
LYMPHOCYTES NFR BLD AUTO: 19 %
MCH RBC QN AUTO: 28.9 PG (ref 26.6–33)
MCHC RBC AUTO-ENTMCNC: 33 G/DL (ref 31.5–35.7)
MCV RBC AUTO: 88 FL (ref 79–97)
MONOCYTES # BLD AUTO: 0.7 X10E3/UL (ref 0.1–0.9)
MONOCYTES NFR BLD AUTO: 8 %
NEUTROPHILS # BLD AUTO: 5.6 X10E3/UL (ref 1.4–7)
NEUTROPHILS NFR BLD AUTO: 68 %
PLATELET # BLD AUTO: 192 X10E3/UL (ref 150–450)
POTASSIUM SERPL-SCNC: 4.3 MMOL/L (ref 3.5–5.2)
PROT SERPL-MCNC: 7.4 G/DL (ref 6–8.5)
RBC # BLD AUTO: 5.15 X10E6/UL (ref 4.14–5.8)
SODIUM SERPL-SCNC: 143 MMOL/L (ref 134–144)
WBC # BLD AUTO: 8.3 X10E3/UL (ref 3.4–10.8)

## 2024-12-24 RX ORDER — AZITHROMYCIN 250 MG/1
TABLET, FILM COATED ORAL
Qty: 6 TABLET | Refills: 0 | Status: SHIPPED | OUTPATIENT
Start: 2024-12-24 | End: 2024-12-28

## 2024-12-24 RX ORDER — PREDNISONE 20 MG/1
TABLET ORAL
Qty: 15 TABLET | Refills: 0 | Status: SHIPPED | OUTPATIENT
Start: 2024-12-24

## 2024-12-27 ENCOUNTER — HOSPITAL ENCOUNTER (OUTPATIENT)
Dept: NON INVASIVE DIAGNOSTICS | Age: 59
Discharge: HOME | End: 2024-12-27
Payer: COMMERCIAL

## 2024-12-27 ENCOUNTER — OFFICE VISIT (OUTPATIENT)
Dept: FAMILY MEDICINE CLINIC | Facility: CLINIC | Age: 59
End: 2024-12-27
Payer: COMMERCIAL

## 2024-12-27 VITALS
DIASTOLIC BLOOD PRESSURE: 92 MMHG | SYSTOLIC BLOOD PRESSURE: 178 MMHG | WEIGHT: 315 LBS | HEIGHT: 71 IN | BODY MASS INDEX: 44.1 KG/M2 | HEART RATE: 102 BPM

## 2024-12-27 VITALS — BODY MASS INDEX: 53 KG/M2 | WEIGHT: 315 LBS | OXYGEN SATURATION: 95 % | HEART RATE: 108 BPM

## 2024-12-27 DIAGNOSIS — R60.0 EDEMA OF BOTH LEGS: ICD-10-CM

## 2024-12-27 DIAGNOSIS — I10 PRIMARY HYPERTENSION: Chronic | ICD-10-CM

## 2024-12-27 DIAGNOSIS — R60.0 BILATERAL LEG EDEMA: ICD-10-CM

## 2024-12-27 DIAGNOSIS — E11.9 TYPE 2 DIABETES MELLITUS WITHOUT COMPLICATION, WITH LONG-TERM CURRENT USE OF INSULIN (HCC): Chronic | ICD-10-CM

## 2024-12-27 DIAGNOSIS — R06.02 SOB (SHORTNESS OF BREATH): ICD-10-CM

## 2024-12-27 DIAGNOSIS — Z79.4 TYPE 2 DIABETES MELLITUS WITHOUT COMPLICATION, WITH LONG-TERM CURRENT USE OF INSULIN (HCC): Chronic | ICD-10-CM

## 2024-12-27 DIAGNOSIS — G51.0 BELL'S PALSY: Primary | ICD-10-CM

## 2024-12-27 PROCEDURE — 93306 TTE W/DOPPLER COMPLETE: CPT

## 2024-12-27 PROCEDURE — 99214 OFFICE O/P EST MOD 30 MIN: CPT

## 2024-12-27 PROCEDURE — 93306 TTE W/DOPPLER COMPLETE: CPT | Performed by: INTERNAL MEDICINE

## 2024-12-27 RX ORDER — PREDNISONE 20 MG/1
TABLET ORAL
Qty: 15 TABLET | Refills: 0 | Status: SHIPPED | OUTPATIENT
Start: 2024-12-27

## 2024-12-27 RX ORDER — VALACYCLOVIR HYDROCHLORIDE 1 G/1
1000 TABLET, FILM COATED ORAL 3 TIMES DAILY
Qty: 21 TABLET | Refills: 0 | Status: SHIPPED | OUTPATIENT
Start: 2024-12-27 | End: 2025-01-03

## 2024-12-27 RX ADMIN — PERFLUTREN 1.2 ML/MIN: 6.52 INJECTION, SUSPENSION INTRAVENOUS at 08:02

## 2024-12-27 NOTE — PROGRESS NOTES
"Name: Garry Tate      : 1965      MRN: [id number]  Encounter Provider: LISA Menendez  Encounter Date: 2024   Encounter department: [facility]  :  Assessment & Plan  Bell's palsy  Patient reports on Tues that his \"face started to feel weird and swollen.\" Unable to close eye today. No numbness/tingling, no UE or LE weakness, visual/speech disturbances, headaches, altered mental status. Patient unable to close eye and smile is asymmetric. Patient currently on prednisone--- instructed to take for 10 days since onset of facial symptoms.. Prescribed valtrex. Ordered lyme and herpes labs. Instructed to go to ER should any of the symptoms mentioned above present. Return if symptoms fail to improve or worsen.   Orders:    valACYclovir (VALTREX) 1,000 mg tablet; Take 1 tablet (1,000 mg total) by mouth 3 (three) times a day for 7 days    Herpes I/II IgG EMA w Reflex to HSV-2; Future    Lyme Total AB W Reflex to IGM/IGG; Future    predniSONE 20 mg tablet; TAKE 1 TABLET BID X 5 DAYS THEN TAKE 1 TABLET QD FOR 5 DAYS    Primary hypertension  152/78 in office today. Patient did not monitor BP over the past couple of days. Girlfriend states that she will make sure he takes daily. Encouraged to contact office if BPs trending above 140/90 for med adjustment.         Type 2 diabetes mellitus without complication, with long-term current use of insulin (HCC)  Reports blood glucose has not gone above 190 on steroid. Encouraged to monitor closely. Patient verbalized understanding.   Lab Results   Component Value Date    HGBA1C 7.5 (H) 10/09/2024            Edema of both legs  Edema has decreased significantly in legs and feet. Reports he has not taken his torsemide since the weekend. Encouraged DWT with log. .              History of Present Illness     HPI  Review of Systems   Constitutional:  Negative for activity change, fatigue and fever.   HENT:  Negative for congestion, " ear pain, rhinorrhea and sore throat.    Eyes:  Negative for pain.   Respiratory:  Negative for cough, shortness of breath and wheezing.    Cardiovascular:  Negative for chest pain and leg swelling.   Gastrointestinal:  Negative for abdominal pain, diarrhea, nausea and vomiting.   Musculoskeletal:  Negative for arthralgias and myalgias.   Skin:  Negative for rash.   Neurological:  Positive for facial asymmetry. Negative for dizziness, weakness and numbness.   All other systems reviewed and are negative.      Objective   Pulse (!) 108   Wt (!) 172 kg (380 lb)   SpO2 95%   BMI 53.00 kg/m²      Physical Exam  Vitals and nursing note reviewed.   Constitutional:       General: He is not in acute distress.     Appearance: He is well-developed.   HENT:      Head: Normocephalic and atraumatic.      Right Ear: Tympanic membrane, ear canal and external ear normal. There is no impacted cerumen.      Left Ear: Tympanic membrane, ear canal and external ear normal. There is no impacted cerumen.   Eyes:      General: No visual field deficit.     Conjunctiva/sclera: Conjunctivae normal.      Pupils: Pupils are equal, round, and reactive to light.   Cardiovascular:      Rate and Rhythm: Normal rate and regular rhythm.      Heart sounds: Normal heart sounds. No murmur heard.  Pulmonary:      Effort: Pulmonary effort is normal. No respiratory distress.      Breath sounds: Normal breath sounds. No wheezing.   Abdominal:      General: Bowel sounds are normal. There is no distension.      Palpations: Abdomen is soft.      Tenderness: There is no abdominal tenderness.   Musculoskeletal:      Cervical back: Neck supple.      Right lower leg: Edema present.      Left lower leg: Edema present.   Skin:     General: Skin is warm and dry.      Capillary Refill: Capillary refill takes less than 2 seconds.      Findings: No rash.   Neurological:      Mental Status: He is alert and oriented to person, place, and time. Mental status is at  baseline.      GCS: GCS eye subscore is 4. GCS verbal subscore is 5. GCS motor subscore is 6.      Cranial Nerves: Facial asymmetry present. No dysarthria.      Sensory: Sensation is intact.      Motor: Motor function is intact. No weakness.      Coordination: Coordination is intact.      Gait: Gait is intact. Gait normal.   Psychiatric:         Mood and Affect: Mood normal.         Behavior: Behavior normal.       Administrative Statements   I have spent a total time of 30 minutes in caring for this patient on the day of the visit/encounter including Risks and benefits of tx options, Instructions for management, Patient and family education, Importance of tx compliance, Risk factor reductions, Impressions, Documenting in the medical record, Reviewing / ordering tests, medicine, procedures  , and Obtaining or reviewing history  .

## 2024-12-27 NOTE — ASSESSMENT & PLAN NOTE
152/78 in office today. Patient did not monitor BP over the past couple of days. Girlfriend states that she will make sure he takes daily. Encouraged to contact office if BPs trending above 140/90 for med adjustment.

## 2024-12-27 NOTE — ASSESSMENT & PLAN NOTE
Reports blood glucose has not gone above 190 on steroid. Encouraged to monitor closely. Patient verbalized understanding.   Lab Results   Component Value Date    HGBA1C 7.5 (H) 10/09/2024

## 2024-12-27 NOTE — ASSESSMENT & PLAN NOTE
Edema has decreased significantly in legs and feet. Reports he has not taken his torsemide since the weekend. Encouraged DWT with log. .

## 2024-12-28 ENCOUNTER — RESULTS FOLLOW-UP (OUTPATIENT)
Dept: FAMILY MEDICINE CLINIC | Facility: CLINIC | Age: 59
End: 2024-12-28

## 2024-12-28 LAB
B BURGDOR IGG+IGM SER QL IA: NEGATIVE
HSV1 IGG SER IA-ACNC: REACTIVE
HSV2 IGG SER IA-ACNC: NON REACTIVE

## 2024-12-29 DIAGNOSIS — Z00.6 ENCOUNTER FOR EXAMINATION FOR NORMAL COMPARISON OR CONTROL IN CLINICAL RESEARCH PROGRAM: ICD-10-CM

## 2024-12-29 LAB
AORTIC ROOT: 3.6 CM
APICAL FOUR CHAMBER EJECTION FRACTION: 50 %
ASCENDING AORTA: 3.6 CM
BSA FOR ECHO PROCEDURE: 2.84 M2
DOP CALC LVOT AREA: 4.91 CM2
DOP CALC LVOT DIAMETER: 2.5 CM
E WAVE DECELERATION TIME: 60 MS
E/A RATIO: 0.65
FRACTIONAL SHORTENING: 43 (ref 28–44)
INTERVENTRICULAR SEPTUM IN DIASTOLE (PARASTERNAL SHORT AXIS VIEW): 1.4 CM
INTERVENTRICULAR SEPTUM: 1.4 CM (ref 0.6–1.1)
LAAS-AP2: 18.4 CM2
LAAS-AP4: 18.9 CM2
LEFT ATRIUM SIZE: 3.5 CM
LEFT ATRIUM VOLUME (MOD BIPLANE): 52 ML
LEFT ATRIUM VOLUME INDEX (MOD BIPLANE): 18.3 ML/M2
LEFT INTERNAL DIMENSION IN SYSTOLE: 3.1 CM (ref 2.1–4)
LEFT VENTRICULAR INTERNAL DIMENSION IN DIASTOLE: 5.4 CM (ref 3.5–6)
LEFT VENTRICULAR POSTERIOR WALL IN END DIASTOLE: 1.4 CM
LEFT VENTRICULAR STROKE VOLUME: 101 ML
LVSV (TEICH): 101 ML
MV E'TISSUE VEL-SEP: 7 CM/S
MV PEAK A VEL: 0.85 M/S
MV PEAK E VEL: 55 CM/S
MV STENOSIS PRESSURE HALF TIME: 17 MS
MV VALVE AREA P 1/2 METHOD: 12.94
RIGHT ATRIUM AREA SYSTOLE A4C: 23.9 CM2
RIGHT VENTRICLE ID DIMENSION: 4.5 CM
SL CV LEFT ATRIUM LENGTH A2C: 5.2 CM
SL CV LV EF: 50
SL CV PED ECHO LEFT VENTRICLE DIASTOLIC VOLUME (MOD BIPLANE) 2D: 139 ML
SL CV PED ECHO LEFT VENTRICLE SYSTOLIC VOLUME (MOD BIPLANE) 2D: 38 ML
TRICUSPID ANNULAR PLANE SYSTOLIC EXCURSION: 1.6 CM

## 2025-01-05 DIAGNOSIS — E78.2 MIXED HYPERLIPIDEMIA: Chronic | ICD-10-CM

## 2025-01-05 DIAGNOSIS — E03.9 ACQUIRED HYPOTHYROIDISM: ICD-10-CM

## 2025-01-05 DIAGNOSIS — G25.81 RESTLESS LEG: ICD-10-CM

## 2025-01-07 RX ORDER — ATORVASTATIN CALCIUM 20 MG/1
20 TABLET, FILM COATED ORAL DAILY
Qty: 30 TABLET | Refills: 0 | Status: SHIPPED | OUTPATIENT
Start: 2025-01-07

## 2025-01-07 RX ORDER — ROPINIROLE 2 MG/1
2 TABLET, FILM COATED ORAL 3 TIMES DAILY
Qty: 270 TABLET | Refills: 1 | Status: SHIPPED | OUTPATIENT
Start: 2025-01-07

## 2025-01-07 RX ORDER — LISINOPRIL 40 MG/1
40 TABLET ORAL DAILY
Qty: 90 TABLET | Refills: 1 | Status: SHIPPED | OUTPATIENT
Start: 2025-01-07

## 2025-01-09 LAB
ALBUMIN SERPL-MCNC: 4.4 G/DL (ref 3.9–4.9)
ALP SERPL-CCNC: 100 IU/L (ref 44–121)
ALT SERPL-CCNC: 39 IU/L (ref 0–44)
AST SERPL-CCNC: 19 IU/L (ref 0–40)
BASOPHILS # BLD AUTO: 0 X10E3/UL (ref 0–0.2)
BASOPHILS NFR BLD AUTO: 0 %
BILIRUB SERPL-MCNC: 0.6 MG/DL (ref 0–1.2)
BUN SERPL-MCNC: 20 MG/DL (ref 8–27)
BUN/CREAT SERPL: 21 (ref 10–24)
CALCIUM SERPL-MCNC: 9.8 MG/DL (ref 8.6–10.2)
CHLORIDE SERPL-SCNC: 100 MMOL/L (ref 96–106)
CHOLEST SERPL-MCNC: 179 MG/DL (ref 100–199)
CO2 SERPL-SCNC: 27 MMOL/L (ref 20–29)
CREAT SERPL-MCNC: 0.95 MG/DL (ref 0.76–1.27)
EGFR: 91 ML/MIN/1.73
EOSINOPHIL # BLD AUTO: 0 X10E3/UL (ref 0–0.4)
EOSINOPHIL NFR BLD AUTO: 0 %
ERYTHROCYTE [DISTWIDTH] IN BLOOD BY AUTOMATED COUNT: 12.7 % (ref 11.6–15.4)
GLOBULIN SER-MCNC: 2.6 G/DL (ref 1.5–4.5)
GLUCOSE SERPL-MCNC: 169 MG/DL (ref 70–99)
HCT VFR BLD AUTO: 49.9 % (ref 37.5–51)
HDLC SERPL-MCNC: 57 MG/DL
HGB BLD-MCNC: 15.9 G/DL (ref 13–17.7)
IMM GRANULOCYTES # BLD: 0.1 X10E3/UL (ref 0–0.1)
IMM GRANULOCYTES NFR BLD: 1 %
LDLC SERPL CALC-MCNC: 97 MG/DL (ref 0–99)
LDLC/HDLC SERPL: 1.7 RATIO (ref 0–3.6)
LYMPHOCYTES # BLD AUTO: 1.2 X10E3/UL (ref 0.7–3.1)
LYMPHOCYTES NFR BLD AUTO: 10 %
MCH RBC QN AUTO: 28.1 PG (ref 26.6–33)
MCHC RBC AUTO-ENTMCNC: 31.9 G/DL (ref 31.5–35.7)
MCV RBC AUTO: 88 FL (ref 79–97)
MONOCYTES # BLD AUTO: 0.5 X10E3/UL (ref 0.1–0.9)
MONOCYTES NFR BLD AUTO: 4 %
NEUTROPHILS # BLD AUTO: 11 X10E3/UL (ref 1.4–7)
NEUTROPHILS NFR BLD AUTO: 85 %
PLATELET # BLD AUTO: 202 X10E3/UL (ref 150–450)
POTASSIUM SERPL-SCNC: 4.5 MMOL/L (ref 3.5–5.2)
PROT SERPL-MCNC: 7 G/DL (ref 6–8.5)
PSA SERPL-MCNC: 0.8 NG/ML (ref 0–4)
RBC # BLD AUTO: 5.66 X10E6/UL (ref 4.14–5.8)
SL AMB REFLEX CRITERIA: NORMAL
SL AMB VLDL CHOLESTEROL CALC: 25 MG/DL (ref 5–40)
SODIUM SERPL-SCNC: 144 MMOL/L (ref 134–144)
TRIGL SERPL-MCNC: 145 MG/DL (ref 0–149)
URATE SERPL-MCNC: 5.6 MG/DL (ref 3.8–8.4)
WBC # BLD AUTO: 12.8 X10E3/UL (ref 3.4–10.8)

## 2025-01-10 ENCOUNTER — RESULTS FOLLOW-UP (OUTPATIENT)
Dept: FAMILY MEDICINE CLINIC | Facility: CLINIC | Age: 60
End: 2025-01-10

## 2025-01-25 DIAGNOSIS — J40 BRONCHITIS: ICD-10-CM

## 2025-01-25 RX ORDER — PREDNISONE 20 MG/1
TABLET ORAL
Qty: 15 TABLET | Refills: 0 | Status: CANCELLED | OUTPATIENT
Start: 2025-01-25

## 2025-02-03 ENCOUNTER — OFFICE VISIT (OUTPATIENT)
Dept: FAMILY MEDICINE CLINIC | Facility: CLINIC | Age: 60
End: 2025-02-03
Payer: COMMERCIAL

## 2025-02-03 VITALS
BODY MASS INDEX: 44.1 KG/M2 | DIASTOLIC BLOOD PRESSURE: 82 MMHG | OXYGEN SATURATION: 90 % | WEIGHT: 315 LBS | HEIGHT: 71 IN | RESPIRATION RATE: 20 BRPM | TEMPERATURE: 97.4 F | SYSTOLIC BLOOD PRESSURE: 148 MMHG | HEART RATE: 102 BPM

## 2025-02-03 DIAGNOSIS — E11.00 TYPE 2 DIABETES MELLITUS WITH HYPEROSMOLARITY WITHOUT COMA, WITHOUT LONG-TERM CURRENT USE OF INSULIN (HCC): ICD-10-CM

## 2025-02-03 DIAGNOSIS — J45.50 SEVERE PERSISTENT ASTHMA WITHOUT COMPLICATION: ICD-10-CM

## 2025-02-03 DIAGNOSIS — R60.0 EDEMA OF BOTH LEGS: ICD-10-CM

## 2025-02-03 DIAGNOSIS — J40 BRONCHITIS: Primary | ICD-10-CM

## 2025-02-03 PROCEDURE — 99214 OFFICE O/P EST MOD 30 MIN: CPT

## 2025-02-03 RX ORDER — AZITHROMYCIN 250 MG/1
TABLET, FILM COATED ORAL
Qty: 6 TABLET | Refills: 0 | Status: SHIPPED | OUTPATIENT
Start: 2025-02-03 | End: 2025-02-07

## 2025-02-03 RX ORDER — TORSEMIDE 20 MG/1
20 TABLET ORAL 2 TIMES DAILY
Qty: 180 TABLET | Refills: 2 | Status: SHIPPED | OUTPATIENT
Start: 2025-02-03

## 2025-02-03 RX ORDER — DULAGLUTIDE 4.5 MG/.5ML
4.5 INJECTION, SOLUTION SUBCUTANEOUS WEEKLY
Qty: 6 ML | Refills: 2 | Status: SHIPPED | OUTPATIENT
Start: 2025-02-03

## 2025-02-03 NOTE — ASSESSMENT & PLAN NOTE
"Encouraged compliance with daily weights---keep log. Contact us with weight gain 3+ lbs overnight or 5+ lbs in 5-7 days. Limit daily sodium/salt intake to 2000 mg daily to prevent fluid retention.--- has been eating a lot of processed/premade meals due to living in a hotel. Encouraged to   avoid canned foods, fast food/Chinese food, and processed meats (hot dogs, lunch meat, and sausage etc.). Caution with condiments. Limit fluid intake to 2000 mL or 2 liters (about 60-65 ounces) daily.  Avoid electrolyte replacement drinks (such as Gatorade, Pedialyte, Propel, Liquid IV, etc.).     Patient reports that compression stockings \"make my swelling worse.\" Wrapped legs with ace wraps. Encouraged daily use especially when legs are dependent. Encouraged elevation when sitting/lying. Will double torsemide (40 mg) until back at baseline weight then return to torsemide 20 mg. Patient verbalized understanding.   Orders:    torsemide (DEMADEX) 20 mg tablet; Take 1 tablet (20 mg total) by mouth 2 (two) times a day    "

## 2025-02-03 NOTE — ASSESSMENT & PLAN NOTE
Patient reports that insurance would not initially cover trulicity 4.5 mg. States that it should be covered now-- requested new script.  Lab Results   Component Value Date    HGBA1C 7.5 (H) 10/09/2024       Orders:    Dulaglutide (Trulicity) 4.5 MG/0.5ML SOAJ; Inject 4.5 mg under the skin once a week
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
## 2025-02-04 ENCOUNTER — TELEPHONE (OUTPATIENT)
Dept: FAMILY MEDICINE CLINIC | Facility: CLINIC | Age: 60
End: 2025-02-04

## 2025-02-04 DIAGNOSIS — E03.9 ACQUIRED HYPOTHYROIDISM: ICD-10-CM

## 2025-02-04 RX ORDER — DULAGLUTIDE 4.5 MG/.5ML
4.5 INJECTION, SOLUTION SUBCUTANEOUS WEEKLY
Refills: 2 | OUTPATIENT
Start: 2025-02-04

## 2025-02-04 NOTE — TELEPHONE ENCOUNTER
Reason for call:   [] Refill   [x] Prior Auth  [] Other:     Office:   [x] [facility] - LISA Menendez   [] Specialty/Provider -     Medication:  Dulaglutide (Trulicity) 4.5 MG/0.5ML SOAJ    Dose/Frequency:  Inject 4.5 mg under the skin once a week     Quantity: 6 mL     Pharmacy: [facility]/pharm[facility] #[id number] - [place] [address]  [address] [address]  Phone: [phone]  Fax: [phone]    Does the patient have enough for 3 days?   [] Yes   [x] No - Send as HP to POD

## 2025-02-05 NOTE — TELEPHONE ENCOUNTER
PA for Trulicity 4.5MG/0.5ML auto-injectors NOT REQUIRED     Reason (screenshot if applicable)          Patient advised by          [x] MyChart Message  [] Phone call   []LMOM  []L/M to call office as no active Communication consent on file  []Unable to leave detailed message as VM not approved on Communication consent       Pharmacy advised by    [x]Fax  []Phone call

## 2025-02-05 NOTE — TELEPHONE ENCOUNTER
PA for Trulicity 4.5MG/0.5ML auto-injectorsSUBMITTED to [employer]    via    [x]CMM-KEY: [id number]  []Surescripts-Case ID #   []Availity-Auth ID # NDC #   []Faxed to plan   []Other website   []Phone call Case ID #     []PA sent as URGENT    All office notes, labs and other pertaining documents and studies sent. Clinical questions answered. Awaiting determination from insurance company.     Turnaround time for your insurance to make a decision on your Prior Authorization can take 7-21 business days.

## 2025-02-13 DIAGNOSIS — E11.00 TYPE 2 DIABETES MELLITUS WITH HYPEROSMOLARITY WITHOUT COMA, WITHOUT LONG-TERM CURRENT USE OF INSULIN (HCC): ICD-10-CM

## 2025-02-13 RX ORDER — DULAGLUTIDE 4.5 MG/.5ML
4.5 INJECTION, SOLUTION SUBCUTANEOUS WEEKLY
Qty: 6 ML | Refills: 0 | OUTPATIENT
Start: 2025-02-13

## 2025-02-24 ENCOUNTER — OFFICE VISIT (OUTPATIENT)
Dept: FAMILY MEDICINE CLINIC | Facility: CLINIC | Age: 60
End: 2025-02-24
Payer: COMMERCIAL

## 2025-02-24 VITALS
HEIGHT: 71 IN | HEART RATE: 104 BPM | TEMPERATURE: 98.2 F | DIASTOLIC BLOOD PRESSURE: 88 MMHG | SYSTOLIC BLOOD PRESSURE: 148 MMHG | OXYGEN SATURATION: 93 % | WEIGHT: 315 LBS | BODY MASS INDEX: 44.1 KG/M2

## 2025-02-24 DIAGNOSIS — E66.01 MORBID OBESITY WITH BMI OF 50.0-59.9, ADULT (HCC): ICD-10-CM

## 2025-02-24 DIAGNOSIS — E11.00 TYPE 2 DIABETES MELLITUS WITH HYPEROSMOLARITY WITHOUT COMA, WITHOUT LONG-TERM CURRENT USE OF INSULIN (HCC): ICD-10-CM

## 2025-02-24 DIAGNOSIS — G47.33 OBSTRUCTIVE SLEEP APNEA: Primary | ICD-10-CM

## 2025-02-24 DIAGNOSIS — R60.0 EDEMA OF BOTH LEGS: ICD-10-CM

## 2025-02-24 LAB — SL AMB POCT HEMOGLOBIN AIC: 6.6 (ref ?–6.5)

## 2025-02-24 PROCEDURE — 83036 HEMOGLOBIN GLYCOSYLATED A1C: CPT

## 2025-02-24 PROCEDURE — 99214 OFFICE O/P EST MOD 30 MIN: CPT

## 2025-02-24 RX ORDER — DULAGLUTIDE 4.5 MG/.5ML
4.5 INJECTION, SOLUTION SUBCUTANEOUS WEEKLY
Qty: 6 ML | Refills: 2 | Status: SHIPPED | OUTPATIENT
Start: 2025-02-24

## 2025-02-24 NOTE — ASSESSMENT & PLAN NOTE
A1C improved from 7.5 to 6.6. has been unable to get trulicity 4.5 mg---been using 3 mg. Foot exam completed. Eye exam due 2026. Microalbumin due 10/2025.   Lab Results   Component Value Date    HGBA1C 6.6 (A) 02/24/2025       Orders:    POCT hemoglobin A1c    Dulaglutide (Trulicity) 4.5 MG/0.5ML SOAJ; Inject 4.5 mg under the skin once a week    Ambulatory Referral to Weight Management; Future

## 2025-02-24 NOTE — ASSESSMENT & PLAN NOTE
Does not use cpap. States when he lost weight previously he no longer snored or had apneic periods. Patient has since had weight gain but denies snoring or apneic episodes. Declines sleep med consult.   Orders:    Ambulatory Referral to Weight Management; Future

## 2025-02-24 NOTE — PROGRESS NOTES
Transition of Care Visit  Name: Garry Tate      : 1965      MRN: [id number]  Encounter Provider: LISA Menendez  Encounter Date: 2025   Encounter department: [facility]    Assessment & Plan  Type 2 diabetes mellitus with hyperosmolarity without coma, without long-term current use of insulin ([facility])  A1C improved from 7.5 to 6.6. has been unable to get trulicity 4.5 mg---been using 3 mg. Foot exam completed. Eye exam due . Microalbumin due 10/2025.   Lab Results   Component Value Date    HGBA1C 6.6 (A) 2025       Orders:    POCT hemoglobin A1c    Dulaglutide (Trulicity) 4.5 MG/0.5ML SOAJ; Inject 4.5 mg under the skin once a week    Ambulatory Referral to Weight Management; Future    Obstructive sleep apnea  Does not use cpap. States when he lost weight previously he no longer snored or had apneic periods. Patient has since had weight gain but denies snoring or apneic episodes. Declines sleep med consult.   Orders:    Ambulatory Referral to Weight Management; Future    Morbid obesity with BMI of 50.0-59.9, adult ([facility])  Prior Authorization Clinical Questions for Weight Management Pharmacotherapy    2. Does the patient have a diagnosis of obesity, confirmed by a BMI greater than or equal to 30 kg/m^2?: Yes  3. Does the patient have a BMI of greater than or equal to 27 kg/m^2 with at least one weight-related comorbidity/risk factor/complication (e.g. diabetes, dyslipidemia, coronary artery disease)?: Yes  4. Weight-related co-morbidities/risk factors: type 2 diabetes, dyslipidemia, hypertension, obstructive sleep apnea (TREE), asthma/reactive airway disease  5. WEGOVY CVA Indication: Does patient have established documented cardiovascular disease (history of a prior heart attack (myocardial infarction), stroke, or symptomatic peripheral arterial disease (PAD)?: No  6. ZEPBOUND TREE Indication: Does patient have documented TREE diagnosed via sleep study  "(insurance will require copy of sleep study results for approval)?: Yes  7. Has the patient been on a weight loss regimen of low-calorie diet, increased physical activity, and lifestyle modifications for a minimum of 6 months?: Yes  8. Has the patient completed a comprehensive weight loss program (ie, Weight Watchers, Noom, Bariatrics, other saad on phone)? If so, what?: No  9. Does the patient have a history of type 2 diabetes?: Yes  10. Has the member tried and failed other weight loss medication within the past 12 months?: No  11. Will the member use requested medication in combination with another GLP agonist or weight loss drug?: Yes  12. Is the medication a controlled substance?: No     Baseline weight (in pounds): 386 lbs      Patient with chronic joint pain. States, \"I was told I need knee replacements but I can't with my weight.\" Ref weight mgmt.       Orders:    Ambulatory Referral to Weight Management; Future      Edema of both legs  Reports some improvement with BLLE edema. Patient has not been using ace wraps as recommended. Typically sits in a dependent position. Encouraged to elevate. States that he is taking torsemide 40 mg 3x/week and 20 mg 4x/week. Encouraged DWT. 11 lb weight loss since last visit.             History of Present Illness     See assessment/plan.         Review of Systems   Constitutional:  Negative for activity change, fatigue and fever.   HENT:  Negative for congestion, ear pain, rhinorrhea and sore throat.    Eyes:  Negative for pain.   Respiratory:  Negative for cough, shortness of breath and wheezing.    Cardiovascular:  Positive for leg swelling. Negative for chest pain.   Gastrointestinal:  Negative for abdominal pain, diarrhea, nausea and vomiting.   Musculoskeletal:  Positive for arthralgias and gait problem. Negative for myalgias.   Skin:  Negative for rash.   Neurological:  Negative for dizziness, weakness and numbness.   Psychiatric/Behavioral:  Negative for dysphoric " "mood and suicidal ideas. The patient is not nervous/anxious.    All other systems reviewed and are negative.    Objective   /88   Pulse 104   Temp 98.2 °F (36.8 °C) (Tympanic)   Ht 5' 11\" (1.803 m)   Wt (!) 175 kg (386 lb)   SpO2 93%   BMI 53.84 kg/m²     Physical Exam  Vitals and nursing note reviewed.   Constitutional:       General: He is not in acute distress.     Appearance: Normal appearance. He is well-developed. He is not ill-appearing.   HENT:      Head: Normocephalic and atraumatic.      Right Ear: External ear normal.      Left Ear: External ear normal.   Cardiovascular:      Rate and Rhythm: Normal rate and regular rhythm.      Pulses: Pulses are weak.           Dorsalis pedis pulses are 1+ on the right side and 1+ on the left side.        Posterior tibial pulses are 1+ on the right side and 1+ on the left side.      Heart sounds: Normal heart sounds. No murmur heard.  Pulmonary:      Effort: Pulmonary effort is normal. No respiratory distress.      Breath sounds: Normal breath sounds. No wheezing.   Abdominal:      General: Bowel sounds are normal. There is no distension.      Palpations: Abdomen is soft.      Tenderness: There is no abdominal tenderness.   Musculoskeletal:      Cervical back: Neck supple.      Right lower leg: Edema present.      Left lower leg: Edema present.   Feet:      Right foot:      Skin integrity: Dry skin present. No ulcer, skin breakdown, erythema, warmth or callus.      Left foot:      Skin integrity: Dry skin present. No ulcer, skin breakdown, erythema, warmth or callus.   Skin:     General: Skin is warm and dry.      Capillary Refill: Capillary refill takes less than 2 seconds.   Neurological:      Mental Status: He is alert and oriented to person, place, and time. Mental status is at baseline.   Psychiatric:         Mood and Affect: Mood normal.         Behavior: Behavior normal.       Medications have been reviewed by provider in current encounter  Diabetic " Foot Exam    Patient's shoes and socks removed.    Right Foot/Ankle   Right Foot Inspection  Skin Exam: skin normal, skin intact and dry skin. No warmth, no callus, no erythema, no maceration, no abnormal color, no pre-ulcer, no ulcer and no callus.     Toe Exam: ROM and strength within normal limits.     Sensory   Vibration: diminished  Monofilament testing: diminished    Vascular  Capillary refills: < 3 seconds  The right DP pulse is 1+. The right PT pulse is 1+.     Left Foot/Ankle  Left Foot Inspection  Skin Exam: skin normal, skin intact and dry skin. No warmth, no erythema, no maceration, normal color, no pre-ulcer, no ulcer and no callus.     Toe Exam: ROM and strength within normal limits.     Sensory   Vibration: diminished  Monofilament testing: diminished    Vascular  Capillary refills: < 3 seconds  The left DP pulse is 1+. The left PT pulse is 1+.     Assign Risk Category  No deformity present  Loss of protective sensation  Weak pulses  Risk: 2    Administrative Statements   I have spent a total time of 30 minutes in caring for this patient on the day of the visit/encounter including Diagnostic results, Risks and benefits of tx options, Instructions for management, Patient and family education, Importance of tx compliance, Risk factor reductions, Impressions, Documenting in the medical record, Reviewing/placing orders in the medical record (including tests, medications, and/or procedures), and Obtaining or reviewing history  .

## 2025-02-24 NOTE — ASSESSMENT & PLAN NOTE
Reports some improvement with BLLE edema. Patient has not been using ace wraps as recommended. Typically sits in a dependent position. Encouraged to elevate. States that he is taking torsemide 40 mg 3x/week and 20 mg 4x/week. Encouraged DWT. 11 lb weight loss since last visit.

## 2025-03-01 DIAGNOSIS — J45.20 MILD INTERMITTENT ASTHMA WITHOUT COMPLICATION: ICD-10-CM

## 2025-03-02 RX ORDER — FLUTICASONE FUROATE AND VILANTEROL TRIFENATATE 200; 25 UG/1; UG/1
POWDER RESPIRATORY (INHALATION)
Qty: 60 EACH | Refills: 5 | Status: SHIPPED | OUTPATIENT
Start: 2025-03-02

## 2025-03-05 ENCOUNTER — TELEPHONE (OUTPATIENT)
Age: 60
End: 2025-03-05

## 2025-03-05 DIAGNOSIS — J40 BRONCHITIS, NOT SPECIFIED AS ACUTE OR CHRONIC: Primary | ICD-10-CM

## 2025-03-05 RX ORDER — AZITHROMYCIN 250 MG/1
TABLET, FILM COATED ORAL
Qty: 6 TABLET | Refills: 0 | Status: SHIPPED | OUTPATIENT
Start: 2025-03-05 | End: 2025-03-09

## 2025-03-05 NOTE — TELEPHONE ENCOUNTER
Pt reports Stuffed nose, scratchy throat , dry cough, mucus. Started yesterday, girlfriend has bronchitis. Pt is requesting medication called into  [id number] - [place], [place] - [address]    Pt declined appt at this time but will come in if he has to.

## 2025-03-12 DIAGNOSIS — E78.2 MIXED HYPERLIPIDEMIA: Chronic | ICD-10-CM

## 2025-03-13 RX ORDER — ATORVASTATIN CALCIUM 20 MG/1
20 TABLET, FILM COATED ORAL DAILY
Qty: 90 TABLET | Refills: 1 | Status: SHIPPED | OUTPATIENT
Start: 2025-03-13

## 2025-05-06 NOTE — TELEPHONE ENCOUNTER
Approve      Has appt 9/30/2020 As per  response back, we have to make new request for upcoming appt.    Thanks.

## 2025-05-15 DIAGNOSIS — N40.1 BENIGN PROSTATIC HYPERPLASIA WITH LOWER URINARY TRACT SYMPTOMS, SYMPTOM DETAILS UNSPECIFIED: ICD-10-CM

## 2025-05-15 RX ORDER — TAMSULOSIN HYDROCHLORIDE 0.4 MG/1
0.4 CAPSULE ORAL
Qty: 90 CAPSULE | Refills: 1 | Status: SHIPPED | OUTPATIENT
Start: 2025-05-15

## 2025-05-23 NOTE — PROGRESS NOTES
Adult Annual Physical  Name: Garry Tate      : 1965      MRN: [id number]  Encounter Provider: LISA Menendez  Encounter Date: 2025   Encounter department: [facility]    :  Assessment & Plan  Primary hypertension  BP stable. Continue current medication regimen.        Obstructive sleep apnea  Reports compliance with CPAP.       Gout, unspecified cause, unspecified chronicity, unspecified site  Component      Latest Ref Rng 2025   URIC ACID      3.8 - 8.4 mg/dL 5.6    Reports that he has not been taking allopurinol for nearly 1 year. Trend uric acid levels.     Orders:    Uric acid; Future    Type 2 diabetes mellitus with hyperosmolarity without coma, without long-term current use of insulin ([facility])  A1c well controlled. Continue current medication regimen. Patient to get labs prior to 6 month med check. Foot and eye exam UTD. Will be due for microalbumin at 6 month med check. Patient informed that he will need to pee at that visit. Patient verbalized understanding.   Lab Results   Component Value Date    HGBA1C 6.6 (A) 2025       Orders:    CBC and differential; Future    Comprehensive metabolic panel; Future    Hemoglobin A1c (w/out EAG); Future    Mixed hyperlipidemia  Component      Latest Ref Rng 2025   Cholesterol      100 - 199 mg/dL 179    Triglycerides      0 - 149 mg/dL 145    HDL      >39 mg/dL 57    VLDL Cholesterol Gallito      5 - 40 mg/dL 25    LDL Calculated      0 - 99 mg/dL 97    LDL/HDL RATIO      0.0 - 3.6 ratio 1.7      Lipids well managed. Continue current medication regimen.   Severe persistent asthma without complication  Symptoms well controlled. Continue current medication regimen.        Wellness examination         Encounter for immunization    Orders:    Zoster Vaccine Recombinant IM    Other fatigue    Orders:    T4, free; Future    TSH, 3rd generation; Future    Acute otitis externa of both ears, unspecified  "type  Reports \"itchy ears\" for several weeks. PE suspicious for fungal infection. Ordered antibiotic and fungal drops. Encouraged to follow up if symptoms persist or worsen.   Orders:    acetic acid (VOSOL) 2 % otic solution; Administer 4 drops into both ears 4 (four) times a day    neomycin-polymyxin-hydrocortisone (CORTISPORIN) otic solution; Administer 3 drops into both ears every 6 (six) hours    Family history of cardiovascular disease  Patient reports fam hx of cardiac disease. Inquiring if there were any additional labs to add to evaluate for cardiac disease. Did discuss abnormal EKG/echo. Patient declines cards ref at this time.     12/27/24 echo--  Interpretation Summary  Show Result Comparison     Left Ventricle: Left ventricular cavity size is normal. Wall thickness is mildly increased. There is mild concentric hypertrophy. There is concentric remodeling. The left ventricular ejection fraction is 50%. Systolic function is low normal. Although no diagnostic regional wall motion abnormality was identified, this possibility cannot be completely excluded on the basis of this study due to poor endocardial border definition even with contrast enhancement. Diastolic function is mildly abnormal, consistent with grade I (abnormal) relaxation.    Right Ventricle: Right ventricular cavity size is mildly dilated. Systolic function is normal.    Left Atrium: The atrium is normal in size.    Right Atrium: The atrium is normal in size.    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 Poor r wave progression V1-V3-- old anterior infarct? T wave inversion V2 and V3. Left axis deviation.           Preventive Screenings:  - Diabetes Screening: screening not indicated, has diabetes and orders placed  - Cholesterol Screening: screening not indicated, has hyperlipidemia and orders placed   - Hepatitis C screening: screening up-to-date   - Colon cancer screening: screening up-to-date   - Lung cancer " screening: screening not indicated   - Prostate cancer screening: screening up-to-date     Immunizations:  - Immunizations due: Zoster (Shingrix)  - Risks/benefits immunizations discussed    - Immunizations given per orders      Counseling/Anticipatory Guidance:    - Dental health: discussed importance of regular tooth brushing, flossing, and dental visits.   - Diet: discussed recommendations for a healthy/well-balanced diet.   - Exercise: the importance of regular exercise/physical activity was discussed. Recommend exercise 3-5 times per week for at least 30 minutes.       Depression Screening and Follow-up Plan: Patient was screened for depression during today's encounter. They screened negative with a PHQ-2 score of 0.          History of Present Illness     Adult Annual Physical:  Patient presents for annual physical.     Diet and Physical Activity:  - Diet/Nutrition: frequent junk food.  - Exercise: no formal exercise.    Depression Screening:  - PHQ-2 Score: 0    General Health:  - Sleep: 4-6 hours of sleep on average.  - Hearing: decreased hearing left ear.  - Vision: most recent eye exam > 1 year ago and wears glasses.  - Dental: regular dental visits.     Health:  - History of STDs: no.   - Urinary symptoms: urinary urgency.     Advanced Care Planning:  - Has an advanced directive?: no    - Has a durable medical POA?: no    - ACP document given to patient?: yes      Social Drivers of Health     Tobacco Use: Low Risk  (5/27/2025)    Patient History     Smoking Tobacco Use: Never     Smokeless Tobacco Use: Never     Passive Exposure: Not on file   Alcohol Use: Not At Risk (1/2/2020)    AUDIT-C     Frequency of Alcohol Consumption: 4 or more times a week     Average Number of Drinks: 1 or 2     Frequency of Binge Drinking: Never   Financial Resource Strain: Not on file   Food Insecurity: No Food Insecurity (5/27/2025)    Hunger Vital Sign     Worried About Running Out of Food in the Last Year: Never true      Ran Out of Food in the Last Year: Never true   Transportation Needs: No Transportation Needs (5/27/2025)    PRAPARE - Transportation     Lack of Transportation (Medical): No     Lack of Transportation (Non-Medical): No   Physical Activity: Inactive (1/2/2020)    Exercise Vital Sign     Days of Exercise per Week: 0 days     Minutes of Exercise per Session: 0 min   Stress: Not on file   Social Connections: Not on file   Intimate Partner Violence: Not on file   Depression: Not at risk (5/27/2025)    PHQ-2     PHQ-2 Score: 0   Housing Stability: High Risk (5/27/2025)    Housing Stability Vital Sign     Unable to Pay for Housing in the Last Year: Yes     Number of Times Moved in the Last Year: Not on file     Homeless in the Last Year: Yes   Utilities: Not At Risk (5/27/2025)    [facility] Utilities     Threatened with loss of utilities: No   Health Literacy: Not on file       Review of Systems   Constitutional:  Positive for fatigue. Negative for activity change and fever.   HENT:  Negative for congestion, ear pain, rhinorrhea and sore throat.    Eyes:  Negative for pain.   Respiratory:  Negative for cough, shortness of breath and wheezing.    Cardiovascular:  Positive for leg swelling. Negative for chest pain.   Gastrointestinal:  Negative for abdominal pain, diarrhea, nausea and vomiting.   Musculoskeletal:  Positive for arthralgias. Negative for myalgias.   Skin:  Negative for rash.   Neurological:  Negative for dizziness, weakness and numbness.   Psychiatric/Behavioral:  Negative for dysphoric mood and suicidal ideas. The patient is not nervous/anxious.    All other systems reviewed and are negative.    Medical History Reviewed by provider this encounter:     .  Medications Ordered Prior to Encounter[1]     Objective   There were no vitals taken for this visit.    Physical Exam  Vitals and nursing note reviewed.   Constitutional:       General: He is not in acute distress.     Appearance: Normal appearance. He is  well-developed. He is not ill-appearing.   HENT:      Head: Normocephalic and atraumatic.      Right Ear: Tympanic membrane, ear canal and external ear normal. There is no impacted cerumen.      Left Ear: Tympanic membrane, ear canal and external ear normal. There is no impacted cerumen.      Ears:      Comments: White debris on BL TM     Nose: Nose normal. No congestion or rhinorrhea.      Mouth/Throat:      Mouth: Mucous membranes are moist.      Pharynx: No oropharyngeal exudate or posterior oropharyngeal erythema.     Cardiovascular:      Rate and Rhythm: Normal rate and regular rhythm.      Heart sounds: Normal heart sounds. No murmur heard.  Pulmonary:      Effort: Pulmonary effort is normal. No respiratory distress.      Breath sounds: Normal breath sounds. No wheezing.   Abdominal:      General: There is no distension.      Palpations: Abdomen is soft.      Tenderness: There is no abdominal tenderness.     Musculoskeletal:      Cervical back: Neck supple.      Right lower leg: Edema present.      Left lower leg: Edema present.     Skin:     General: Skin is warm and dry.      Capillary Refill: Capillary refill takes less than 2 seconds.     Neurological:      Mental Status: He is alert and oriented to person, place, and time. Mental status is at baseline.     Psychiatric:         Mood and Affect: Mood normal.         Behavior: Behavior normal.       Administrative Statements   I have spent a total time of 30 minutes in caring for this patient on the day of the visit/encounter including Risks and benefits of tx options, Instructions for management, Patient and family education, Importance of tx compliance, Risk factor reductions, Impressions, Documenting in the medical record, Reviewing/placing orders in the medical record (including tests, medications, and/or procedures), and Obtaining or reviewing history  .         [1]   Current Outpatient Medications on File Prior to Visit   Medication Sig Dispense Refill     albuterol (PROVENTIL HFA,VENTOLIN HFA) 90 mcg/act inhaler TAKE 2 PUFFS BY MOUTH EVERY 4 HOURS AS NEEDED FOR WHEEZE 6.7 g 5    Alcohol Swabs 70 % PADS May substitute brand based on insurance coverage. Check glucose TID. 100 each 0    allopurinol (ZYLOPRIM) 100 mg tablet Take 2 tablets (200 mg total) by mouth daily 180 tablet 3    atorvastatin (LIPITOR) 20 mg tablet TAKE 1 TABLET BY MOUTH EVERY DAY 90 tablet 1    Blood Glucose Monitoring Suppl (OneTouch Verio Reflect) w/Device KIT May substitute brand based on insurance coverage. Check glucose TID. 1 kit 0    Breo Ellipta 200-25 MCG/ACT inhaler INHALE 1 PUFF DAILY RINSE MOUTH AFTER USE. 60 each 5    Diclofenac Sodium (VOLTAREN) 1 % Apply 2 g topically 4 (four) times a day 100 g 1    Dulaglutide (Trulicity) 4.5 MG/0.5ML SOAJ Inject 4.5 mg under the skin once a week 6 mL 2    fluticasone-umeclidinium-vilanterol 100-62.5-25 mcg/actuation inhaler Inhale 1 puff daily Rinse mouth after use. 1 each 5    glucose blood (OneTouch Verio) test strip CHECK BLOOD GLUCOSE 3 TIMES A  strip 1    insulin lispro (HumaLOG KwikPen) 100 units/mL injection pen Inject 26 Units under the skin 3 (three) times a day with meals 30 mL 5    Insulin Pen Needle (BD Pen Needle Dianna 2nd Gen) 32G X 4 MM MISC For use with insulin pen. Pharmacy may dispense brand covered by insurance. 100 each 1    Lancets (OneTouch Delica Plus Plezcy51W) MISC CHECK GLUCOSE 3 TIMES A  each 0    lisinopril (ZESTRIL) 40 mg tablet TAKE 1 TABLET BY MOUTH EVERY DAY 90 tablet 1    Magnesium 500 MG TABS Take by mouth      Multiple Vitamin (MULTI-VITAMIN DAILY PO) Take 1 tablet by mouth daily      potassium chloride (K-DUR,KLOR-CON) 10 mEq tablet Take 1 tablet by mouth 2 (two) times a day 60 tablet 0    predniSONE 20 mg tablet TAKE 1 TABLET BID X 5 DAYS THEN TAKE 1 TABLET QD FOR 5 DAYS (Patient not taking: Reported on 2/24/2025) 15 tablet 0    rOPINIRole (REQUIP) 2 mg tablet TAKE 1 TABLET BY MOUTH 3 TIMES A DAY.  270 tablet 1    tamsulosin (FLOMAX) 0.4 mg TAKE 1 CAPSULE BY MOUTH EVERY DAY WITH DINNER 90 capsule 1    torsemide (DEMADEX) 20 mg tablet Take 1 tablet (20 mg total) by mouth 2 (two) times a day 180 tablet 2    valACYclovir (VALTREX) 1,000 mg tablet Take 1 tablet (1,000 mg total) by mouth 3 (three) times a day for 7 days 21 tablet 0     No current facility-administered medications on file prior to visit.

## 2025-05-27 ENCOUNTER — OFFICE VISIT (OUTPATIENT)
Dept: FAMILY MEDICINE CLINIC | Facility: CLINIC | Age: 60
End: 2025-05-27
Payer: COMMERCIAL

## 2025-05-27 VITALS
OXYGEN SATURATION: 96 % | HEART RATE: 108 BPM | DIASTOLIC BLOOD PRESSURE: 88 MMHG | SYSTOLIC BLOOD PRESSURE: 138 MMHG | BODY MASS INDEX: 53.42 KG/M2 | TEMPERATURE: 98.7 F | WEIGHT: 315 LBS

## 2025-05-27 DIAGNOSIS — G47.33 OBSTRUCTIVE SLEEP APNEA: ICD-10-CM

## 2025-05-27 DIAGNOSIS — E11.00 TYPE 2 DIABETES MELLITUS WITH HYPEROSMOLARITY WITHOUT COMA, WITHOUT LONG-TERM CURRENT USE OF INSULIN (HCC): ICD-10-CM

## 2025-05-27 DIAGNOSIS — R53.83 OTHER FATIGUE: ICD-10-CM

## 2025-05-27 DIAGNOSIS — Z23 ENCOUNTER FOR IMMUNIZATION: ICD-10-CM

## 2025-05-27 DIAGNOSIS — M10.9 GOUT, UNSPECIFIED CAUSE, UNSPECIFIED CHRONICITY, UNSPECIFIED SITE: ICD-10-CM

## 2025-05-27 DIAGNOSIS — Z00.00 WELLNESS EXAMINATION: Primary | ICD-10-CM

## 2025-05-27 DIAGNOSIS — I10 PRIMARY HYPERTENSION: Chronic | ICD-10-CM

## 2025-05-27 DIAGNOSIS — J45.50 SEVERE PERSISTENT ASTHMA WITHOUT COMPLICATION: ICD-10-CM

## 2025-05-27 DIAGNOSIS — E78.2 MIXED HYPERLIPIDEMIA: Chronic | ICD-10-CM

## 2025-05-27 DIAGNOSIS — Z82.49 FAMILY HISTORY OF CARDIOVASCULAR DISEASE: ICD-10-CM

## 2025-05-27 DIAGNOSIS — H60.503 ACUTE OTITIS EXTERNA OF BOTH EARS, UNSPECIFIED TYPE: ICD-10-CM

## 2025-05-27 PROBLEM — E87.6 HYPOKALEMIA: Status: RESOLVED | Noted: 2017-08-14 | Resolved: 2025-05-27

## 2025-05-27 PROBLEM — R53.1 WEAKNESS GENERALIZED: Status: RESOLVED | Noted: 2017-08-14 | Resolved: 2025-05-27

## 2025-05-27 PROBLEM — J45.51 SEVERE PERSISTENT ALLERGIC ASTHMA WITH ACUTE EXACERBATION: Status: RESOLVED | Noted: 2019-01-30 | Resolved: 2025-05-27

## 2025-05-27 PROBLEM — E87.1 HYPONATREMIA: Status: RESOLVED | Noted: 2017-08-14 | Resolved: 2025-05-27

## 2025-05-27 PROCEDURE — 90750 HZV VACC RECOMBINANT IM: CPT

## 2025-05-27 PROCEDURE — 90471 IMMUNIZATION ADMIN: CPT

## 2025-05-27 PROCEDURE — 99396 PREV VISIT EST AGE 40-64: CPT

## 2025-05-27 PROCEDURE — 99214 OFFICE O/P EST MOD 30 MIN: CPT

## 2025-05-27 RX ORDER — ACETIC ACID 20.65 MG/ML
4 SOLUTION AURICULAR (OTIC) 4 TIMES DAILY
Qty: 15 ML | Refills: 0 | Status: SHIPPED | OUTPATIENT
Start: 2025-05-27

## 2025-05-27 RX ORDER — NEOMYCIN SULFATE, POLYMYXIN B SULFATE AND HYDROCORTISONE 3.5; 10000; 1 MG/ML; [IU]/ML; MG/ML
3 SOLUTION AURICULAR (OTIC) EVERY 6 HOURS SCHEDULED
Qty: 10 ML | Refills: 2 | Status: SHIPPED | OUTPATIENT
Start: 2025-05-27

## 2025-05-27 NOTE — ASSESSMENT & PLAN NOTE
Component      Latest Ref Rng 1/8/2025   Cholesterol      100 - 199 mg/dL 179    Triglycerides      0 - 149 mg/dL 145    HDL      >39 mg/dL 57    VLDL Cholesterol Gallito      5 - 40 mg/dL 25    LDL Calculated      0 - 99 mg/dL 97    LDL/HDL RATIO      0.0 - 3.6 ratio 1.7      Lipids well managed. Continue current medication regimen.

## 2025-05-27 NOTE — ASSESSMENT & PLAN NOTE
A1c well controlled. Continue current medication regimen. Patient to get labs prior to 6 month med check. Foot and eye exam UTD. Will be due for microalbumin at 6 month med check. Patient informed that he will need to pee at that visit. Patient verbalized understanding.   Lab Results   Component Value Date    HGBA1C 6.6 (A) 02/24/2025       Orders:    CBC and differential; Future    Comprehensive metabolic panel; Future    Hemoglobin A1c (w/out EAG); Future

## 2025-05-27 NOTE — ASSESSMENT & PLAN NOTE
Component      Latest Ref Rng 1/8/2025   URIC ACID      3.8 - 8.4 mg/dL 5.6    Reports that he has not been taking allopurinol for nearly 1 year. Trend uric acid levels.     Orders:    Uric acid; Future

## 2025-05-28 ENCOUNTER — OFFICE VISIT (OUTPATIENT)
Age: 60
End: 2025-05-28
Payer: COMMERCIAL

## 2025-05-28 VITALS
DIASTOLIC BLOOD PRESSURE: 84 MMHG | SYSTOLIC BLOOD PRESSURE: 132 MMHG | TEMPERATURE: 97.8 F | OXYGEN SATURATION: 98 % | HEART RATE: 106 BPM | HEIGHT: 69 IN | BODY MASS INDEX: 46.65 KG/M2 | WEIGHT: 315 LBS

## 2025-05-28 DIAGNOSIS — E78.5 HYPERLIPIDEMIA: Chronic | ICD-10-CM

## 2025-05-28 DIAGNOSIS — I10 PRIMARY HYPERTENSION: Primary | Chronic | ICD-10-CM

## 2025-05-28 DIAGNOSIS — E11.00 TYPE 2 DIABETES MELLITUS WITH HYPEROSMOLARITY WITHOUT COMA, WITHOUT LONG-TERM CURRENT USE OF INSULIN (HCC): ICD-10-CM

## 2025-05-28 DIAGNOSIS — G47.33 OBSTRUCTIVE SLEEP APNEA: ICD-10-CM

## 2025-05-28 PROCEDURE — 99203 OFFICE O/P NEW LOW 30 MIN: CPT | Performed by: PHYSICIAN ASSISTANT

## 2025-05-28 NOTE — PROGRESS NOTES
Assessment/Plan:  Garry was seen today for consult.    Diagnoses and all orders for this visit:    Primary hypertension    Obstructive sleep apnea    Type 2 diabetes mellitus with hyperosmolarity without coma, without long-term current use of insulin (HCC)  -     semaglutide, 0.25 or 0.5 mg/dose, (Ozempic, 0.25 or 0.5 MG/DOSE,) 2 mg/3 mL injection pen; Take 0.25 mg subcutaneously once a week for 4 weeks than increase to 0.5 mg subcutaneously once a week.    Hyperlipidemia    Other orders  -     Ambulatory Referral to Weight Management         - Discussed options of HealthyCORE-Intensive Lifestyle Intervention Program, Very Low Calorie Diet-VLCD, and Conservative Program and the role of weight loss medications.  - Explained the importance of making lifestyle changes first before starting anti-obesity medications.  - Patient should demonstrate lifestyle changes first before anti-obesity medication initiated.   - Patient is interested in pursuing Conservative Program  - Initial weight loss goal of 5-10% weight loss for improved health as studies have shown this is where we see the greatest impact on improving health and decreasing risk of obesity related conditions.  - Weight loss can improve patient's co-morbid conditions and/or prevent weight-related complications.  - Labs reviewed: As below.      General Recommendations:  Nutrition:  Eat breakfast daily.  Do not skip meals.     Food log (ie.) [email or web address], sparkpeople.com, loseit.com, calorieking.com, etc.    Practice mindful eating.  Be sure to set aside time to eat, eat slowly, and savor your food.    Hydration:    At least 64oz of water daily.  No sugar sweetened beverages.  No juice (eat the fruit instead).    Exercise:  Studies have shown that the ideal exercise goal is somewhere between 150 to 300 minutes of moderate intensity exercise a week.  Start with exercising 10 minutes every other day and gradually increase physical activity with a goal of at  least 150 minutes of moderate intensity exercise a week, divided over at least 3 days a week.  An example of this would be exercising 30 minutes a day, 5 days a week.  Resistance training can increase muscle mass and increase our resting metabolic rate.   FULL BODY resistance training is recommended 2-3 times a week.  Do not do this on consecutive days to allow for muscle recovery.    Aim for a bare minimum 5000 steps, even on days you do not exercise.    Monitoring:   Weigh yourself daily.  If this causes undue stress, then just weigh yourself once a week.  Weigh yourself the same time of the day with the same amount of clothing on.  Preferably this should be done after waking up, before you eat, and with no clothing or minimal clothing on.    Calorie goal:  2200 yoanna/day (Provided with meal plan to follow).    Return visit:    Calorie tracking/deficit  Physical activity goals  AOM  Patient without weight loss on trulicity. Could also use continued glucose management.  Discussed transition trulicity to ozempic.  - Patient denies personal history of pancreatitis. Patient also denies personal and family history of thyroid cancer and multiple endocrine neoplasia type 2 (MEN 2 tumor).   Ozempic use and side effect profile   RTC: 3-4 months       Total time spent reviewing chart, interviewing patient, examining patient, discussing plan, answering all questions, and documentin min.       ______________________________________________________________________        Subjective:   Chief Complaint   Patient presents with    Consult     MWM CONSULT BMI 56.4   Waist - 65.5        HPI: Garry Tate  is a 60 y.o. male with history of HTN, HLD, type 2 diabetes, TREE, and excess weight, here to pursue weight loss management.  Previous notes and records have been reviewed.    Type 2 diabetes - hemoglobin A1c 6.6 (25). Trulicity, insulin   Previously tried rybelsus  HLD - atorvastatin  HTN - lisinopril  Edema - torsemide,  potassium  RLS - requip    Trulicity started 6/2024  Start weight: 378 lbs (6/26/24)  Current weight: 383 lbs (5/28/25)     Denies any appetite suppression with trulicity.     HPI  Wt Readings from Last 20 Encounters:   05/28/25 (!) 173 kg (381 lb 9.6 oz)   05/27/25 (!) 174 kg (383 lb)   02/24/25 (!) 175 kg (386 lb)   02/03/25 (!) 180 kg (397 lb)   12/27/24 (!) 182 kg (402 lb)   12/27/24 (!) 172 kg (380 lb)   12/23/24 (!) 183 kg (402 lb 6.4 oz)   08/09/24 (!) 174 kg (384 lb 9.6 oz)   06/26/24 (!) 171 kg (378 lb)   07/03/23 (!) 147 kg (324 lb)   06/26/23 (!) 143 kg (316 lb)   06/14/23 (!) 149 kg (328 lb)   02/07/23 (!) 160 kg (353 lb 6.4 oz)   01/31/22 (!) 143 kg (316 lb)   04/19/21 (!) 147 kg (323 lb)   03/03/21 (!) 143 kg (315 lb)   09/30/20 (!) 153 kg (338 lb)   02/21/20 (!) 155 kg (342 lb)   01/02/20 (!) 154 kg (340 lb)   12/12/19 (!) 156 kg (344 lb)     Weight History  Highest adult weight:: (Patient-Rptd) (P) 390 lbs  Lowest adult weight:: (Patient-Rptd) (P) 300 lbs  What is your goal weight?: (Patient-Rptd) (P) 290 lbs  How long have you struggled with maintaining a healthy weight?: (Patient-Rptd) (P) Adult  What weight loss attempts have you had in the past?: (Patient-Rptd) (P) Diet, Exercise, Prescription Medications  Which prescription medications have you tried?: (Patient-Rptd) (P) Trulicity    Food Behaviors  Do you have any of the following food related behaviors?: (Patient-Rptd) (P) Boredom Eating, Cravings  Is there a trouble area of the day when these behaviors are more prominent?: (Patient-Rptd) (P) Yes    Food History  Provide the time that you typically eat and common foods you eat for each meal/snack: : (Patient-Rptd) (P) Breakfast, Lunch, Dinner  Provide the time and common foods you eat for breakfast:: (Patient-Rptd) (P) Eggs 4 am  Provide the time and common foods you eat for lunch:: (Patient-Rptd) (P) 1 sandwich  Provide the time and common foods you eat for : (Patient-Rptd) (P) Chicken  beef  etc.  How often do you go out to eat or have take out?: (Patient-Rptd) (P) time a week  Daily beverage intake, please select the beverages you consume daily and the amount(s):: (Patient-Rptd) (P) Water  How many cups of water?: (Patient-Rptd) (P) 6  Do you consume alcohol?: (Patient-Rptd) (P) No    Physical Activity   How often do you exercise?: (Patient-Rptd) (P) Dont  How long are your activity sessions?: (Patient-Rptd) (P) 0    Sleep  How many hours of sleep are you getting per night?: (Patient-Rptd) (P) 5  How would you rate your quality of sleep?: (Patient-Rptd) (P) Poor  Do you have a history of sleep apnea?: (Patient-Rptd) (P) No    Family/Social History  Do you have a family history of obesity?: (Patient-Rptd) (P) No    Gynecological History  If of childbearing age, are you using birth control?: (Patient-Rptd) (P) N/A  Menopausal status?: (Patient-Rptd) (P) N/A      Past Medical History[1]  Patient denies personal and family history of  pancreatitis, thyroid cancer, MEN-2 tumors.  Denies any hx of glaucoma, seizures, kidney stones, gallstones.  Denies Hx of CAD, PAD, palpitations, arrhythmia.   Denies uncontrolled anxiety or depression, suicidal behavior or thinking , insomnia or sleep disturbance.     Past Surgical History[2]  The following portions of the patient's history were reviewed and updated as appropriate: allergies, current medications, past family history, past medical history, past social history, past surgical history, and problem list.    Review Of Systems:  Review of Systems   Constitutional:  Negative for fatigue and fever.   HENT:  Negative for sore throat, trouble swallowing and voice change.    Respiratory:  Negative for shortness of breath.    Cardiovascular:  Negative for chest pain.   Gastrointestinal:  Negative for abdominal pain, constipation, diarrhea, nausea and vomiting.   Endocrine: Negative for cold intolerance and heat intolerance.   Genitourinary:  Negative for difficulty  "urinating.   Musculoskeletal:  Positive for arthralgias, back pain and myalgias.   Psychiatric/Behavioral:  Negative for suicidal ideas. The patient is not nervous/anxious.    All other systems reviewed and are negative.      Objective:  /84   Pulse (!) 106   Temp 97.8 °F (36.6 °C)   Ht 5' 9\" (1.753 m)   Wt (!) 173 kg (381 lb 9.6 oz)   SpO2 98%   BMI 56.35 kg/m²   Physical Exam  Vitals and nursing note reviewed.   Constitutional:       General: He is not in acute distress.     Appearance: Normal appearance. He is obese. He is not ill-appearing, toxic-appearing or diaphoretic.   HENT:      Head: Normocephalic and atraumatic.     Eyes:      General:         Right eye: No discharge.         Left eye: No discharge.      Conjunctiva/sclera: Conjunctivae normal.     Pulmonary:      Effort: Pulmonary effort is normal. No respiratory distress.     Musculoskeletal:         General: Normal range of motion.      Cervical back: Normal range of motion. No rigidity.      Right lower leg: No edema.      Left lower leg: No edema.     Skin:     Coloration: Skin is not pale.      Findings: No erythema or rash.     Neurological:      General: No focal deficit present.      Mental Status: He is alert.     Psychiatric:         Mood and Affect: Mood normal.         Labs and Imaging  Recent labs and imaging have been personally reviewed.  Lab Results   Component Value Date    WBC 12.8 (H) 01/08/2025    HGB 15.9 01/08/2025    HCT 49.9 01/08/2025    MCV 88 01/08/2025     01/08/2025     Lab Results   Component Value Date     01/17/2018    SODIUM 144 01/08/2025    K 4.5 01/08/2025     01/08/2025    CO2 27 01/08/2025    AGAP 7 06/26/2023    BUN 20 01/08/2025    CREATININE 0.95 01/08/2025    GLUC 169 (H) 01/08/2025    CALCIUM 8.6 06/26/2023    AST 19 01/08/2025    ALT 39 01/08/2025    ALKPHOS 76 06/25/2023    PROT 6.9 01/17/2018    TP 7.0 01/08/2025    BILITOT 0.5 01/17/2018    TBILI 0.6 01/08/2025    EGFR 91 " 01/08/2025     Lab Results   Component Value Date    HGBA1C 6.6 (A) 02/24/2025     Lab Results   Component Value Date    STZ2MHVHHYLY 1.775 08/14/2017     Lab Results   Component Value Date    CHOLESTEROL 179 01/08/2025     Lab Results   Component Value Date    HDL 57 01/08/2025     Lab Results   Component Value Date    TRIG 145 01/08/2025     Lab Results   Component Value Date    LDLCALC 97 01/08/2025            [1]   Past Medical History:  Diagnosis Date    Allergic     Arthritis     Asthma     Chronic pain     Diabetes mellitus (HCC)     Gout     Hyperlipidemia     Hypertension     Hypokalemia 08/14/2017    Hyponatremia 08/14/2017    Impaired fasting glucose     last assessed: 12/16/2015    Knee arthropathy     last assessed: 3/23/2016    Opioid dependence, uncomplicated (HCC) 12/23/2024    Weakness generalized 08/14/2017   [2]   Past Surgical History:  Procedure Laterality Date    KNEE ARTHROSCOPY Bilateral     KNEE SURGERY

## 2025-05-28 NOTE — PATIENT INSTRUCTIONS
INJECT Ozempic Weekly SUBCUTANEOUSLY.  - Start Ozempic 0.25 mg subcutaneously once a week for 4 weeks than increase to 0.5 mg subcutaneously once a week     -IF NAUSEA/VOMITING DEVELOP STOP MEDICATION FOR A FEW DAYS AND DECREASE TO PREVIOUSLY TOLERATED DOSE. STAY HYDRATED.    -IF YOU DEVELOP SEVERE ABDOMINAL PAIN WHICH MAY RADIATE TO THE BACK, SOMETIMES ASSOCIATED WITH FEVER, AND VOMITING, STOP MEDICATION AND SEEK URGENT CARE AS THIS MAY BE A SIGN OF PANCREATITIS.     -MONITOR YOUR RESTING HEART RATE AND CONTACT THE OFFICE IF IT GOES ABOVE 100.

## 2025-06-07 DIAGNOSIS — E11.65 HYPERGLYCEMIA DUE TO TYPE 2 DIABETES MELLITUS (HCC): ICD-10-CM

## 2025-06-08 DIAGNOSIS — E11.65 HYPERGLYCEMIA DUE TO TYPE 2 DIABETES MELLITUS (HCC): ICD-10-CM

## 2025-06-08 RX ORDER — PEN NEEDLE, DIABETIC 32GX 5/32"
NEEDLE, DISPOSABLE MISCELLANEOUS
Qty: 100 EACH | Refills: 1 | Status: SHIPPED | OUTPATIENT
Start: 2025-06-08 | End: 2025-06-08

## 2025-06-08 RX ORDER — PEN NEEDLE, DIABETIC 32GX 5/32"
NEEDLE, DISPOSABLE MISCELLANEOUS
Qty: 100 EACH | Refills: 1 | Status: SHIPPED | OUTPATIENT
Start: 2025-06-08 | End: 2025-06-10

## 2025-06-09 DIAGNOSIS — E11.65 HYPERGLYCEMIA DUE TO TYPE 2 DIABETES MELLITUS (HCC): ICD-10-CM

## 2025-06-10 DIAGNOSIS — E03.9 ACQUIRED HYPOTHYROIDISM: ICD-10-CM

## 2025-06-10 DIAGNOSIS — E78.2 MIXED HYPERLIPIDEMIA: Chronic | ICD-10-CM

## 2025-06-10 DIAGNOSIS — E11.65 HYPERGLYCEMIA DUE TO TYPE 2 DIABETES MELLITUS (HCC): ICD-10-CM

## 2025-06-10 RX ORDER — PEN NEEDLE, DIABETIC 32GX 5/32"
NEEDLE, DISPOSABLE MISCELLANEOUS
Qty: 100 EACH | Refills: 1 | Status: SHIPPED | OUTPATIENT
Start: 2025-06-10

## 2025-06-11 RX ORDER — LISINOPRIL 40 MG/1
40 TABLET ORAL DAILY
Qty: 90 TABLET | Refills: 1 | Status: SHIPPED | OUTPATIENT
Start: 2025-06-11

## 2025-06-11 RX ORDER — INSULIN LISPRO 100 [IU]/ML
26 INJECTION, SOLUTION INTRAVENOUS; SUBCUTANEOUS
Qty: 30 ML | Refills: 5 | Status: SHIPPED | OUTPATIENT
Start: 2025-06-11

## 2025-06-11 RX ORDER — ATORVASTATIN CALCIUM 20 MG/1
20 TABLET, FILM COATED ORAL DAILY
Qty: 90 TABLET | Refills: 1 | Status: SHIPPED | OUTPATIENT
Start: 2025-06-11

## 2025-06-18 ENCOUNTER — TELEPHONE (OUTPATIENT)
Age: 60
End: 2025-06-18

## 2025-06-18 NOTE — TELEPHONE ENCOUNTER
PT called in checking on status of PA for ozempic. Updated PT that PA has been initiated. PT verbalized understanding.

## 2025-06-18 NOTE — TELEPHONE ENCOUNTER
PA for Ozempic was SUBMITTED to [facility]    via    [x]CMM-KEY: [id number]  []Surescripts-Case ID #   []Availity-Auth ID # NDC #   []Faxed to plan   []Other website   []Phone call Case ID #     []PA sent as URGENT    All office notes, labs and other pertaining documents and studies sent. Clinical questions answered. Awaiting determination from insurance company.     Turnaround time for your insurance to make a decision on your Prior Authorization can take 7-21 business days.

## 2025-06-19 NOTE — TELEPHONE ENCOUNTER
PA for Ozempic was APPROVED     Date(s) approved 6/18/25-6/18/26    Case #    Patient advised by          [x]MyChart Message  []Phone call   []LMOM  []L/M to call office as no active Communication consent on file  []Unable to leave detailed message as VM not approved on Communication consent       Pharmacy advised by    []Fax  [x]Phone call  []Secure Chat    Specialty Pharmacy    []     Approval letter scanned into Media Yes

## 2025-06-22 ENCOUNTER — RESULTS FOLLOW-UP (OUTPATIENT)
Dept: FAMILY MEDICINE CLINIC | Facility: CLINIC | Age: 60
End: 2025-06-22

## 2025-06-22 DIAGNOSIS — M10.9 GOUT, UNSPECIFIED CAUSE, UNSPECIFIED CHRONICITY, UNSPECIFIED SITE: ICD-10-CM

## 2025-06-22 LAB
ALBUMIN SERPL-MCNC: 4.4 G/DL (ref 3.9–4.9)
ALP SERPL-CCNC: 107 IU/L (ref 44–121)
ALT SERPL-CCNC: 24 IU/L (ref 0–44)
AST SERPL-CCNC: 19 IU/L (ref 0–40)
BASOPHILS # BLD AUTO: 0.1 X10E3/UL (ref 0–0.2)
BASOPHILS NFR BLD AUTO: 1 %
BILIRUB SERPL-MCNC: 0.8 MG/DL (ref 0–1.2)
BUN SERPL-MCNC: 21 MG/DL (ref 8–27)
BUN/CREAT SERPL: 24 (ref 10–24)
CALCIUM SERPL-MCNC: 9.3 MG/DL (ref 8.6–10.2)
CHLORIDE SERPL-SCNC: 103 MMOL/L (ref 96–106)
CHOLEST SERPL-MCNC: 142 MG/DL (ref 100–199)
CO2 SERPL-SCNC: 23 MMOL/L (ref 20–29)
CREAT SERPL-MCNC: 0.87 MG/DL (ref 0.76–1.27)
EGFR: 98 ML/MIN/1.73
EOSINOPHIL # BLD AUTO: 0.4 X10E3/UL (ref 0–0.4)
EOSINOPHIL NFR BLD AUTO: 6 %
ERYTHROCYTE [DISTWIDTH] IN BLOOD BY AUTOMATED COUNT: 13.6 % (ref 11.6–15.4)
GLOBULIN SER-MCNC: 2.9 G/DL (ref 1.5–4.5)
GLUCOSE SERPL-MCNC: 123 MG/DL (ref 70–99)
HBA1C MFR BLD: 6.8 % (ref 4.8–5.6)
HCT VFR BLD AUTO: 46.1 % (ref 37.5–51)
HDLC SERPL-MCNC: 47 MG/DL
HGB BLD-MCNC: 14.8 G/DL (ref 13–17.7)
IMM GRANULOCYTES # BLD: 0 X10E3/UL (ref 0–0.1)
IMM GRANULOCYTES NFR BLD: 0 %
LDLC SERPL CALC-MCNC: 69 MG/DL (ref 0–99)
LDLC/HDLC SERPL: 1.5 RATIO (ref 0–3.6)
LYMPHOCYTES # BLD AUTO: 1.8 X10E3/UL (ref 0.7–3.1)
LYMPHOCYTES NFR BLD AUTO: 26 %
MCH RBC QN AUTO: 28.4 PG (ref 26.6–33)
MCHC RBC AUTO-ENTMCNC: 32.1 G/DL (ref 31.5–35.7)
MCV RBC AUTO: 89 FL (ref 79–97)
MONOCYTES # BLD AUTO: 0.7 X10E3/UL (ref 0.1–0.9)
MONOCYTES NFR BLD AUTO: 11 %
NEUTROPHILS # BLD AUTO: 3.8 X10E3/UL (ref 1.4–7)
NEUTROPHILS NFR BLD AUTO: 56 %
PLATELET # BLD AUTO: 177 X10E3/UL (ref 150–450)
POTASSIUM SERPL-SCNC: 4.4 MMOL/L (ref 3.5–5.2)
PROT SERPL-MCNC: 7.3 G/DL (ref 6–8.5)
RBC # BLD AUTO: 5.21 X10E6/UL (ref 4.14–5.8)
SL AMB VLDL CHOLESTEROL CALC: 26 MG/DL (ref 5–40)
SODIUM SERPL-SCNC: 142 MMOL/L (ref 134–144)
T4 FREE SERPL-MCNC: 1.08 NG/DL (ref 0.82–1.77)
TRIGL SERPL-MCNC: 153 MG/DL (ref 0–149)
TSH SERPL DL<=0.005 MIU/L-ACNC: 1.65 UIU/ML (ref 0.45–4.5)
URATE SERPL-MCNC: 9.2 MG/DL (ref 3.8–8.4)
WBC # BLD AUTO: 6.8 X10E3/UL (ref 3.4–10.8)

## 2025-06-24 RX ORDER — ALLOPURINOL 100 MG/1
200 TABLET ORAL DAILY
Qty: 180 TABLET | Refills: 3 | Status: SHIPPED | OUTPATIENT
Start: 2025-06-24

## 2025-07-27 DIAGNOSIS — G25.81 RESTLESS LEG: ICD-10-CM

## 2025-07-29 RX ORDER — ROPINIROLE 2 MG/1
2 TABLET, FILM COATED ORAL 3 TIMES DAILY
Qty: 270 TABLET | Refills: 1 | Status: SHIPPED | OUTPATIENT
Start: 2025-07-29

## 2025-08-01 ENCOUNTER — OFFICE VISIT (OUTPATIENT)
Dept: FAMILY MEDICINE CLINIC | Facility: CLINIC | Age: 60
End: 2025-08-01
Payer: COMMERCIAL

## 2025-08-01 VITALS — HEART RATE: 86 BPM | WEIGHT: 315 LBS | OXYGEN SATURATION: 92 % | BODY MASS INDEX: 56.56 KG/M2

## 2025-08-01 DIAGNOSIS — L03.116 CELLULITIS OF LEFT LOWER EXTREMITY: Primary | ICD-10-CM

## 2025-08-01 PROCEDURE — 99213 OFFICE O/P EST LOW 20 MIN: CPT | Performed by: NURSE PRACTITIONER

## 2025-08-01 RX ORDER — CEPHALEXIN 500 MG/1
500 CAPSULE ORAL 3 TIMES DAILY
Qty: 30 CAPSULE | Refills: 0 | Status: SHIPPED | OUTPATIENT
Start: 2025-08-01 | End: 2025-08-11

## 2025-08-01 RX ORDER — OXYCODONE AND ACETAMINOPHEN 10; 325 MG/1; MG/1
1 TABLET ORAL
COMMUNITY
Start: 2025-07-23